# Patient Record
Sex: MALE | Race: WHITE | NOT HISPANIC OR LATINO | Employment: OTHER | RURAL
[De-identification: names, ages, dates, MRNs, and addresses within clinical notes are randomized per-mention and may not be internally consistent; named-entity substitution may affect disease eponyms.]

---

## 2017-03-11 ENCOUNTER — CONVERSION ENCOUNTER (OUTPATIENT)
Dept: FAMILY MEDICINE CLINIC | Facility: CLINIC | Age: 62
End: 2017-03-11

## 2017-03-12 LAB
ALBUMIN SERPL-MCNC: 4.5 G/DL (ref 3.6–5.1)
ALP SERPL-CCNC: 115 U/L (ref 40–115)
ALT SERPL-CCNC: 20 U/L (ref 9–46)
AST SERPL-CCNC: 16 U/L (ref 10–35)
BASOPHILS # BLD AUTO: 12 CELLS/UL (ref 0–200)
BASOPHILS NFR BLD AUTO: 0.2 %
BILIRUB SERPL-MCNC: 0.6 MG/DL (ref 0.2–1.2)
BUN SERPL-MCNC: 19 MG/DL (ref 7–25)
BUN/CREAT SERPL: NORMAL (CALC) (ref 6–22)
CALCIUM SERPL-MCNC: 9.9 MG/DL (ref 8.6–10.3)
CHLORIDE SERPL-SCNC: 106 MMOL/L (ref 98–110)
CHOLEST SERPL-MCNC: 184 MG/DL (ref 125–200)
CHOLEST/HDLC SERPL: 4.7 (CALC)
CONV CO2: 30 MMOL/L (ref 20–31)
CONV TOTAL PROTEIN: 7.2 G/DL (ref 6.1–8.1)
CREAT UR-MCNC: 1.1 MG/DL (ref 0.7–1.33)
EOSINOPHIL # BLD AUTO: 1.6 %
EOSINOPHIL # BLD AUTO: 98 CELLS/UL (ref 15–500)
ERYTHROCYTE [DISTWIDTH] IN BLOOD BY AUTOMATED COUNT: 14.9 % (ref 11–15)
GLOBULIN UR ELPH-MCNC: 2.7 MG/DL (ref 1.9–3.7)
GLUCOSE UR QL: 94 MG/DL (ref 65–99)
HCT VFR BLD AUTO: 41.6 % (ref 38.5–50)
HDLC SERPL-MCNC: 39 MG/DL
HGB BLD-MCNC: 14 G/DL (ref 13.2–17.1)
INSULIN SERPL-ACNC: 1.7 (CALC) (ref 1–2.5)
LDLC SERPL CALC-MCNC: 103 MG/DL
LYMPHOCYTES # BLD AUTO: 1867 CELLS/UL (ref 850–3900)
LYMPHOCYTES NFR BLD AUTO: 30.6 %
MCH RBC QN AUTO: 29.2 PG (ref 27–33)
MCHC RBC AUTO-ENTMCNC: 33.7 G/DL (ref 32–36)
MCV RBC AUTO: 86.6 FL (ref 80–100)
MONOCYTES # BLD AUTO: 415 CELLS/UL (ref 200–950)
MONOCYTES NFR BLD AUTO: 6.8 %
NEUTROPHILS # BLD AUTO: 3709 CELLS/UL (ref 1500–7800)
NEUTROPHILS NFR BLD AUTO: 60.8 %
NONHDLC SERPL-MCNC: 145 MG/DL
PLATELET # BLD AUTO: 167 10*3/UL (ref 140–400)
PMV BLD AUTO: 8.9 FL (ref 7.5–12.5)
POTASSIUM SERPL-SCNC: 4.2 MMOL/L (ref 3.5–5.3)
RBC # BLD AUTO: 4.8 MILLION/UL (ref 4.2–5.8)
SODIUM SERPL-SCNC: 141 MMOL/L (ref 135–146)
TRIGL SERPL-MCNC: 208 MG/DL
TSH SERPL-ACNC: 4.23 MIU/L (ref 0.4–4.5)
WBC # BLD AUTO: 6.1 10*3/UL (ref 3.8–10.8)

## 2017-03-22 ENCOUNTER — HOSPITAL ENCOUNTER (OUTPATIENT)
Dept: PREOP | Facility: HOSPITAL | Age: 62
Setting detail: HOSPITAL OUTPATIENT SURGERY
Discharge: HOME | End: 2017-03-22
Attending: INTERNAL MEDICINE | Admitting: INTERNAL MEDICINE

## 2017-03-22 ENCOUNTER — ON CAMPUS - OUTPATIENT (OUTPATIENT)
Dept: URBAN - METROPOLITAN AREA HOSPITAL 85 | Facility: HOSPITAL | Age: 62
End: 2017-03-22
Payer: COMMERCIAL

## 2017-03-22 DIAGNOSIS — Z85.038 PERSONAL HISTORY OF OTHER MALIGNANT NEOPLASM OF LARGE INTEST: ICD-10-CM

## 2017-03-22 DIAGNOSIS — Z85.048 PERSONAL HISTORY OF OTHER MALIGNANT NEOPLASM OF RECTUM, RECT: ICD-10-CM

## 2017-03-22 PROCEDURE — 44388 COLONOSCOPY THRU STOMA SPX: CPT | Performed by: INTERNAL MEDICINE

## 2017-06-24 ENCOUNTER — CONVERSION ENCOUNTER (OUTPATIENT)
Dept: FAMILY MEDICINE CLINIC | Facility: CLINIC | Age: 62
End: 2017-06-24

## 2017-06-25 LAB
ALBUMIN SERPL-MCNC: 4.5 G/DL (ref 3.6–5.1)
ALP SERPL-CCNC: 117 U/L (ref 40–115)
ALT SERPL-CCNC: 13 U/L (ref 9–46)
AST SERPL-CCNC: 13 U/L (ref 10–35)
BILIRUB SERPL-MCNC: 0.5 MG/DL (ref 0.2–1.2)
BUN SERPL-MCNC: 14 MG/DL (ref 7–25)
BUN/CREAT SERPL: ABNORMAL (CALC) (ref 6–22)
CALCIUM SERPL-MCNC: 9.5 MG/DL (ref 8.6–10.3)
CHLORIDE SERPL-SCNC: 106 MMOL/L (ref 98–110)
CHOLEST SERPL-MCNC: 161 MG/DL (ref 125–200)
CHOLEST/HDLC SERPL: 4.5 (CALC)
CONV CO2: 24 MMOL/L (ref 20–31)
CONV TOTAL PROTEIN: 7 G/DL (ref 6.1–8.1)
CREAT UR-MCNC: 1.16 MG/DL (ref 0.7–1.25)
GLOBULIN UR ELPH-MCNC: 2.5 MG/DL (ref 1.9–3.7)
GLUCOSE UR QL: 97 MG/DL (ref 65–99)
HDLC SERPL-MCNC: 36 MG/DL
INSULIN SERPL-ACNC: 1.8 (CALC) (ref 1–2.5)
LDLC SERPL CALC-MCNC: 63 MG/DL
NONHDLC SERPL-MCNC: 125 MG/DL
POTASSIUM SERPL-SCNC: 3.9 MMOL/L (ref 3.5–5.3)
SODIUM SERPL-SCNC: 140 MMOL/L (ref 135–146)
TRIGL SERPL-MCNC: 308 MG/DL
TSH SERPL-ACNC: 7.22 MIU/L (ref 0.4–4.5)

## 2017-09-11 ENCOUNTER — HOSPITAL ENCOUNTER (OUTPATIENT)
Dept: OTHER | Facility: HOSPITAL | Age: 62
Discharge: HOME | End: 2017-09-11
Attending: FAMILY MEDICINE | Admitting: FAMILY MEDICINE

## 2017-11-15 ENCOUNTER — INPATIENT HOSPITAL (OUTPATIENT)
Dept: URBAN - METROPOLITAN AREA HOSPITAL 84 | Facility: HOSPITAL | Age: 62
End: 2017-11-15
Payer: COMMERCIAL

## 2017-11-15 DIAGNOSIS — K56.609 UNSPECIFIED INTESTINAL OBSTRUCTION, UNSPECIFIED AS TO PARTIA: ICD-10-CM

## 2017-11-15 DIAGNOSIS — D64.89 OTHER SPECIFIED ANEMIAS: ICD-10-CM

## 2017-11-15 DIAGNOSIS — Z85.048 PERSONAL HISTORY OF OTHER MALIGNANT NEOPLASM OF RECTUM, RECT: ICD-10-CM

## 2017-11-15 DIAGNOSIS — R10.30 LOWER ABDOMINAL PAIN, UNSPECIFIED: ICD-10-CM

## 2017-11-15 DIAGNOSIS — R93.3 ABNORMAL FINDINGS ON DIAGNOSTIC IMAGING OF OTHER PARTS OF DI: ICD-10-CM

## 2017-11-15 DIAGNOSIS — Z85.038 PERSONAL HISTORY OF OTHER MALIGNANT NEOPLASM OF LARGE INTEST: ICD-10-CM

## 2017-11-15 DIAGNOSIS — R11.2 NAUSEA WITH VOMITING, UNSPECIFIED: ICD-10-CM

## 2017-11-15 PROCEDURE — 99254 IP/OBS CNSLTJ NEW/EST MOD 60: CPT | Performed by: NURSE PRACTITIONER

## 2017-11-24 ENCOUNTER — HOSPITAL ENCOUNTER (OUTPATIENT)
Dept: HOME HEALTH SERVICES | Facility: HOME HEALTHCARE | Age: 62
Setting detail: SPECIMEN
Discharge: HOME | End: 2017-11-24
Attending: INTERNAL MEDICINE | Admitting: INTERNAL MEDICINE

## 2017-11-24 LAB
ABS VARIANT LYMPHS: 0.07 10*3/UL
CONV ABS BANDS: 1.1 10*3/UL
CONV ANISOCYTES: SLIGHT
CONV VARIANT LYMPHOCYTES RELATIVE PERCENT BY MANUAL COUNT: 1 % (ref 0–1)
DIFFERENTIAL METHOD BLD: (no result)
EOSINOPHIL # BLD AUTO: 0.2 10*3/UL (ref 0–0.3)
EOSINOPHIL # BLD AUTO: 3 % (ref 0–3)
ERYTHROCYTE [DISTWIDTH] IN BLOOD BY AUTOMATED COUNT: 15.3 % (ref 11.5–14.5)
ERYTHROCYTE [SEDIMENTATION RATE] IN BLOOD BY WESTERGREN METHOD: 60 MM/HR (ref 0–20)
HCT VFR BLD AUTO: 32.9 % (ref 40–54)
HGB BLD-MCNC: 10.6 G/DL (ref 14–18)
LYMPHOCYTES # BLD AUTO: 1.3 10*3/UL (ref 0.8–4.8)
LYMPHOCYTES NFR BLD AUTO: 18 % (ref 18–42)
MCH RBC QN AUTO: 26.8 PG (ref 26–32)
MCHC RBC AUTO-ENTMCNC: 32.3 G/DL (ref 32–36)
MCV RBC AUTO: 83 FL (ref 80–94)
MONOCYTES # BLD AUTO: 0.5 10*3/UL (ref 0.1–1.3)
MONOCYTES NFR BLD AUTO: 7 % (ref 2–11)
NEUTROPHILS # BLD AUTO: 3.8 10*3/UL (ref 2.3–8.6)
NEUTROPHILS NFR BLD AUTO: 56 % (ref 50–75)
NEUTS BAND NFR BLD MANUAL: 15 % (ref 0–5)
PLATELET # BLD AUTO: 206 10*3/UL (ref 150–450)
PMV BLD AUTO: 8.5 FL (ref 7.4–10.4)
RBC # BLD AUTO: 3.97 10*6/UL (ref 4.6–6)
WBC # BLD AUTO: 7 10*3/UL (ref 4.5–11.5)

## 2017-11-27 ENCOUNTER — HOSPITAL ENCOUNTER (OUTPATIENT)
Dept: OTHER | Facility: HOSPITAL | Age: 62
Setting detail: SPECIMEN
Discharge: HOME | End: 2017-11-27
Attending: INTERNAL MEDICINE | Admitting: INTERNAL MEDICINE

## 2017-11-27 LAB
ANION GAP SERPL CALC-SCNC: 11.4 MMOL/L (ref 10–20)
BUN SERPL-MCNC: 9 MG/DL (ref 8–20)
BUN/CREAT SERPL: 11.3 (ref 6.2–20.3)
CALCIUM SERPL-MCNC: 9.3 MG/DL (ref 8.9–10.3)
CHLORIDE SERPL-SCNC: 104 MMOL/L (ref 101–111)
CONV CO2: 28 MMOL/L (ref 22–32)
CREAT UR-MCNC: 0.8 MG/DL (ref 0.7–1.2)
CRP SERPL-MCNC: 1.09 MG/DL (ref 0–0.7)
GLUCOSE SERPL-MCNC: 87 MG/DL (ref 65–99)
POTASSIUM SERPL-SCNC: 4.4 MMOL/L (ref 3.6–5.1)
SODIUM SERPL-SCNC: 139 MMOL/L (ref 136–144)

## 2017-12-02 ENCOUNTER — HOSPITAL ENCOUNTER (OUTPATIENT)
Dept: CT IMAGING | Facility: HOSPITAL | Age: 62
Discharge: HOME | End: 2017-12-02
Attending: SURGERY | Admitting: SURGERY

## 2017-12-02 LAB — CREAT BLDA-MCNC: 0.8 MG/DL (ref 0.6–1.3)

## 2017-12-22 ENCOUNTER — HOSPITAL ENCOUNTER (OUTPATIENT)
Dept: HOME HEALTH SERVICES | Facility: HOME HEALTHCARE | Age: 62
Setting detail: SPECIMEN
Discharge: HOME | End: 2017-12-22
Attending: INTERNAL MEDICINE | Admitting: INTERNAL MEDICINE

## 2017-12-22 LAB
CONV ABS BANDS: 0.5 10*3/UL
CONV ABS IMM GRAN: 0.29 10*3/UL
CONV PLATELETS GIANT IN BLOOD BY LIGHT MICROSCOPY: (no result)
CONV POLYCHROMASIA IN BLOOD BY LIGHT MICROSCOPY: (no result)
DIFFERENTIAL METHOD BLD: (no result)
EOSINOPHIL # BLD AUTO: 0.1 10*3/UL (ref 0–0.3)
EOSINOPHIL # BLD AUTO: 1 % (ref 0–3)
ERYTHROCYTE [DISTWIDTH] IN BLOOD BY AUTOMATED COUNT: 18 % (ref 11.5–14.5)
ERYTHROCYTE [SEDIMENTATION RATE] IN BLOOD BY WESTERGREN METHOD: 35 MM/HR (ref 0–20)
HCT VFR BLD AUTO: 26.1 % (ref 40–54)
HGB BLD-MCNC: 8.4 G/DL (ref 14–18)
LYMPHOCYTES # BLD AUTO: 1.1 10*3/UL (ref 0.8–4.8)
LYMPHOCYTES NFR BLD AUTO: 11 % (ref 18–42)
MCH RBC QN AUTO: 25.9 PG (ref 26–32)
MCHC RBC AUTO-ENTMCNC: 32 G/DL (ref 32–36)
MCV RBC AUTO: 81 FL (ref 80–94)
METAMYELOCYTES NFR BLD: 3 %
MONOCYTES # BLD AUTO: 0.5 10*3/UL (ref 0.1–1.3)
MONOCYTES NFR BLD AUTO: 5 % (ref 2–11)
NEUTROPHILS # BLD AUTO: 7.1 10*3/UL (ref 2.3–8.6)
NEUTROPHILS NFR BLD AUTO: 75 % (ref 50–75)
NEUTS BAND NFR BLD MANUAL: 5 % (ref 0–5)
PLATELET # BLD AUTO: 281 10*3/UL (ref 150–450)
PMV BLD AUTO: 8 FL (ref 7.4–10.4)
RBC # BLD AUTO: 3.23 10*6/UL (ref 4.6–6)
WBC # BLD AUTO: 9.6 10*3/UL (ref 4.5–11.5)

## 2018-03-12 ENCOUNTER — CONVERSION ENCOUNTER (OUTPATIENT)
Dept: FAMILY MEDICINE CLINIC | Facility: CLINIC | Age: 63
End: 2018-03-12

## 2018-03-13 LAB
ALBUMIN SERPL-MCNC: 4.7 G/DL (ref 3.6–5.1)
ALP SERPL-CCNC: 222 U/L (ref 40–115)
ALT SERPL-CCNC: 56 U/L (ref 9–46)
AST SERPL-CCNC: 31 U/L (ref 10–35)
BASOPHILS # BLD AUTO: 32 CELLS/UL (ref 0–200)
BASOPHILS NFR BLD AUTO: 0.5 %
BILIRUB SERPL-MCNC: 0.7 MG/DL (ref 0.2–1.2)
BUN SERPL-MCNC: 11 MG/DL (ref 7–25)
BUN/CREAT SERPL: ABNORMAL (CALC) (ref 6–22)
CALCIUM SERPL-MCNC: 8.8 MG/DL (ref 8.6–10.3)
CHLORIDE SERPL-SCNC: 103 MMOL/L (ref 98–110)
CHOLEST SERPL-MCNC: 147 MG/DL
CHOLEST/HDLC SERPL: 4 (CALC)
CONV CO2: 28 MMOL/L (ref 20–31)
CONV TOTAL PROTEIN: 7.6 G/DL (ref 6.1–8.1)
CREAT UR-MCNC: 0.87 MG/DL (ref 0.7–1.25)
EOSINOPHIL # BLD AUTO: 1.1 %
EOSINOPHIL # BLD AUTO: 70 CELLS/UL (ref 15–500)
ERYTHROCYTE [DISTWIDTH] IN BLOOD BY AUTOMATED COUNT: 14.6 % (ref 11–15)
GLOBULIN UR ELPH-MCNC: 2.9 MG/DL (ref 1.9–3.7)
GLUCOSE UR QL: 79 MG/DL (ref 65–99)
HCT VFR BLD AUTO: 43.3 % (ref 38.5–50)
HDLC SERPL-MCNC: 37 MG/DL
HGB BLD-MCNC: 14.1 G/DL (ref 13.2–17.1)
INSULIN SERPL-ACNC: 1.6 (CALC) (ref 1–2.5)
LYMPHOCYTES # BLD AUTO: 2240 CELLS/UL (ref 850–3900)
LYMPHOCYTES NFR BLD AUTO: 35 %
MCH RBC QN AUTO: 27.1 PG (ref 27–33)
MCHC RBC AUTO-ENTMCNC: 32.6 G/DL (ref 32–36)
MCV RBC AUTO: 83.3 FL (ref 80–100)
MONOCYTES # BLD AUTO: 467 CELLS/UL (ref 200–950)
MONOCYTES NFR BLD AUTO: 7.3 %
NEUTROPHILS # BLD AUTO: 3590 CELLS/UL (ref 1500–7800)
NEUTROPHILS NFR BLD AUTO: 56.1 %
NONHDLC SERPL-MCNC: 110 MG/DL
PLATELET # BLD AUTO: 211 10*3/UL (ref 140–400)
PMV BLD AUTO: 10.7 FL (ref 7.5–12.5)
POTASSIUM SERPL-SCNC: 3.5 MMOL/L (ref 3.5–5.3)
RBC # BLD AUTO: 5.2 MILLION/UL (ref 4.2–5.8)
SODIUM SERPL-SCNC: 141 MMOL/L (ref 135–146)
TRIGL SERPL-MCNC: 483 MG/DL
TSH SERPL-ACNC: 6.51 MIU/L (ref 0.4–4.5)
WBC # BLD AUTO: 6.4 10*3/UL (ref 3.8–10.8)

## 2018-06-16 ENCOUNTER — CONVERSION ENCOUNTER (OUTPATIENT)
Dept: FAMILY MEDICINE CLINIC | Facility: CLINIC | Age: 63
End: 2018-06-16

## 2018-06-17 LAB
ALBUMIN SERPL-MCNC: 4.6 G/DL (ref 3.6–5.1)
ALP SERPL-CCNC: 144 U/L (ref 40–115)
ALT SERPL-CCNC: 41 U/L (ref 9–46)
AST SERPL-CCNC: 28 U/L (ref 10–35)
BILIRUB SERPL-MCNC: 0.9 MG/DL (ref 0.2–1.2)
BUN SERPL-MCNC: 7 MG/DL (ref 7–25)
BUN/CREAT SERPL: ABNORMAL (CALC) (ref 6–22)
CALCIUM SERPL-MCNC: 9.3 MG/DL (ref 8.6–10.3)
CHLORIDE SERPL-SCNC: 102 MMOL/L (ref 98–110)
CHOLEST SERPL-MCNC: 119 MG/DL
CHOLEST/HDLC SERPL: 3.1 (CALC)
CONV CO2: 23 MMOL/L (ref 20–31)
CONV TOTAL PROTEIN: 7.2 G/DL (ref 6.1–8.1)
CREAT UR-MCNC: 1.03 MG/DL (ref 0.7–1.25)
GLOBULIN UR ELPH-MCNC: 2.6 MG/DL (ref 1.9–3.7)
GLUCOSE UR QL: 90 MG/DL (ref 65–99)
HDLC SERPL-MCNC: 39 MG/DL
INSULIN SERPL-ACNC: 1.8 (CALC) (ref 1–2.5)
LDLC SERPL CALC-MCNC: 50 MG/DL
NONHDLC SERPL-MCNC: 80 MG/DL
POTASSIUM SERPL-SCNC: 3.1 MMOL/L (ref 3.5–5.3)
SODIUM SERPL-SCNC: 138 MMOL/L (ref 135–146)
TRIGL SERPL-MCNC: 244 MG/DL
TSH SERPL-ACNC: 2.13 MIU/L (ref 0.4–4.5)

## 2018-07-14 ENCOUNTER — CONVERSION ENCOUNTER (OUTPATIENT)
Dept: FAMILY MEDICINE CLINIC | Facility: CLINIC | Age: 63
End: 2018-07-14

## 2018-07-15 LAB
ALBUMIN SERPL-MCNC: 4.4 G/DL (ref 3.6–5.1)
ALP SERPL-CCNC: 151 U/L (ref 40–115)
ALT SERPL-CCNC: 47 U/L (ref 9–46)
AST SERPL-CCNC: 25 U/L (ref 10–35)
BILIRUB SERPL-MCNC: 0.7 MG/DL (ref 0.2–1.2)
BUN SERPL-MCNC: 9 MG/DL (ref 7–25)
BUN/CREAT SERPL: ABNORMAL (CALC) (ref 6–22)
CALCIUM SERPL-MCNC: 9.3 MG/DL (ref 8.6–10.3)
CHLORIDE SERPL-SCNC: 107 MMOL/L (ref 98–110)
CONV CO2: 23 MMOL/L (ref 20–31)
CONV TOTAL PROTEIN: 7.2 G/DL (ref 6.1–8.1)
CREAT UR-MCNC: 1.02 MG/DL (ref 0.7–1.25)
GLOBULIN UR ELPH-MCNC: 2.8 MG/DL (ref 1.9–3.7)
GLUCOSE UR QL: 89 MG/DL (ref 65–99)
INSULIN SERPL-ACNC: 1.6 (CALC) (ref 1–2.5)
POTASSIUM SERPL-SCNC: 3.7 MMOL/L (ref 3.5–5.3)
SODIUM SERPL-SCNC: 138 MMOL/L (ref 135–146)

## 2018-10-25 ENCOUNTER — CONVERSION ENCOUNTER (OUTPATIENT)
Dept: FAMILY MEDICINE CLINIC | Facility: CLINIC | Age: 63
End: 2018-10-25

## 2018-10-26 LAB
ALBUMIN SERPL-MCNC: 4.7 G/DL (ref 3.6–5.1)
ALP SERPL-CCNC: 138 U/L (ref 40–115)
ALT SERPL-CCNC: 54 U/L (ref 9–46)
AST SERPL-CCNC: 28 U/L (ref 10–35)
BILIRUB SERPL-MCNC: 1.1 MG/DL (ref 0.2–1.2)
BUN SERPL-MCNC: 12 MG/DL (ref 7–25)
BUN/CREAT SERPL: ABNORMAL (CALC) (ref 6–22)
CALCIUM SERPL-MCNC: 9.4 MG/DL (ref 8.6–10.3)
CHLORIDE SERPL-SCNC: 107 MMOL/L (ref 98–110)
CONV CO2: 25 MMOL/L (ref 20–32)
CONV TOTAL PROTEIN: 7.2 G/DL (ref 6.1–8.1)
CREAT UR-MCNC: 1.12 MG/DL (ref 0.7–1.25)
GLOBULIN UR ELPH-MCNC: 2.5 MG/DL (ref 1.9–3.7)
GLUCOSE UR QL: 84 MG/DL (ref 65–99)
INSULIN SERPL-ACNC: 1.9 (CALC) (ref 1–2.5)
MAGNESIUM SERPL-MCNC: 1.3 MG/DL (ref 1.5–2.5)
POTASSIUM SERPL-SCNC: 4.3 MMOL/L (ref 3.5–5.3)
SODIUM SERPL-SCNC: 139 MMOL/L (ref 135–146)

## 2019-01-03 ENCOUNTER — CONVERSION ENCOUNTER (OUTPATIENT)
Dept: FAMILY MEDICINE CLINIC | Facility: CLINIC | Age: 64
End: 2019-01-03

## 2019-01-04 LAB
ALBUMIN SERPL-MCNC: 4.8 G/DL (ref 3.6–5.1)
ALP SERPL-CCNC: 133 U/L (ref 40–115)
ALT SERPL-CCNC: 53 U/L (ref 9–46)
AST SERPL-CCNC: 34 U/L (ref 10–35)
BILIRUB SERPL-MCNC: 0.8 MG/DL (ref 0.2–1.2)
BUN SERPL-MCNC: 13 MG/DL (ref 7–25)
BUN/CREAT SERPL: ABNORMAL (CALC) (ref 6–22)
CALCIUM SERPL-MCNC: 9.9 MG/DL (ref 8.6–10.3)
CHLORIDE SERPL-SCNC: 106 MMOL/L (ref 98–110)
CHOLEST SERPL-MCNC: 140 MG/DL
CHOLEST/HDLC SERPL: 3.5 (CALC)
CONV CO2: 31 MMOL/L (ref 20–32)
CONV TOTAL PROTEIN: 7.8 G/DL (ref 6.1–8.1)
CREAT UR-MCNC: 1.12 MG/DL (ref 0.7–1.25)
GLOBULIN UR ELPH-MCNC: 3 MG/DL (ref 1.9–3.7)
GLUCOSE UR QL: 86 MG/DL (ref 65–99)
HDLC SERPL-MCNC: 40 MG/DL
INSULIN SERPL-ACNC: 1.6 (CALC) (ref 1–2.5)
LDLC SERPL CALC-MCNC: 60 MG/DL
MAGNESIUM SERPL-MCNC: 1.7 MG/DL (ref 1.5–2.5)
NONHDLC SERPL-MCNC: 100 MG/DL
POTASSIUM SERPL-SCNC: 4.4 MMOL/L (ref 3.5–5.3)
SODIUM SERPL-SCNC: 142 MMOL/L (ref 135–146)
TRIGL SERPL-MCNC: 331 MG/DL

## 2019-04-15 ENCOUNTER — HOSPITAL ENCOUNTER (OUTPATIENT)
Dept: OTHER | Facility: HOSPITAL | Age: 64
Setting detail: SPECIMEN
Discharge: HOME | End: 2019-04-15
Attending: UROLOGY | Admitting: UROLOGY

## 2019-04-15 ENCOUNTER — CONVERSION ENCOUNTER (OUTPATIENT)
Dept: FAMILY MEDICINE CLINIC | Facility: CLINIC | Age: 64
End: 2019-04-15

## 2019-04-15 LAB
ALBUMIN SERPL-MCNC: 4.3 G/DL (ref 3.6–5.1)
ALP SERPL-CCNC: 151 U/L (ref 40–115)
ALT SERPL-CCNC: 54 U/L (ref 9–46)
AST SERPL-CCNC: 31 U/L (ref 10–35)
BILIRUB SERPL-MCNC: 1.3 MG/DL (ref 0.2–1.2)
BUN SERPL-MCNC: 15 MG/DL (ref 7–25)
BUN/CREAT SERPL: 10 (CALC) (ref 6–22)
CALCIUM SERPL-MCNC: 9.2 MG/DL (ref 8.6–10.3)
CHLORIDE SERPL-SCNC: 103 MMOL/L (ref 98–110)
CHOLEST SERPL-MCNC: 104 MG/DL
CHOLEST/HDLC SERPL: 2.9 (CALC)
CONV CO2: 26 MMOL/L (ref 20–32)
CONV TOTAL PROTEIN: 7 G/DL (ref 6.1–8.1)
CREAT UR-MCNC: 1.46 MG/DL (ref 0.7–1.25)
GLOBULIN UR ELPH-MCNC: 2.7 MG/DL (ref 1.9–3.7)
GLUCOSE UR QL: 128 MG/DL (ref 65–139)
HDLC SERPL-MCNC: 36 MG/DL
INSULIN SERPL-ACNC: 1.6 (CALC) (ref 1–2.5)
LDLC SERPL CALC-MCNC: 42 MG/DL
NONHDLC SERPL-MCNC: 68 MG/DL
POTASSIUM SERPL-SCNC: 3.3 MMOL/L (ref 3.5–5.3)
SODIUM SERPL-SCNC: 139 MMOL/L (ref 135–146)
SPECIMEN SOURCE: NORMAL
TRIGL SERPL-MCNC: 184 MG/DL

## 2019-04-25 ENCOUNTER — CONVERSION ENCOUNTER (OUTPATIENT)
Dept: FAMILY MEDICINE CLINIC | Facility: CLINIC | Age: 64
End: 2019-04-25

## 2019-04-29 LAB
ALBUMIN SERPL-MCNC: 4.3 G/DL (ref 3.6–5.1)
ALP BONE CFR SERPL: 29 % (ref 28–66)
ALP INTEST CFR SERPL: 3 % (ref 1–24)
ALP ISO SERPL-ACNC: 68 % (ref 25–69)
ALP SERPL-CCNC: 116 U/L (ref 40–115)
ALP SERPL-CCNC: 116 U/L (ref 40–115)
ALT SERPL-CCNC: 60 U/L (ref 9–46)
AST SERPL-CCNC: 34 U/L (ref 10–35)
BILIRUB SERPL-MCNC: 0.7 MG/DL (ref 0.2–1.2)
BUN SERPL-MCNC: 15 MG/DL (ref 7–25)
BUN/CREAT SERPL: ABNORMAL (CALC) (ref 6–22)
CALCIUM SERPL-MCNC: 9.4 MG/DL (ref 8.6–10.3)
CHLORIDE SERPL-SCNC: 104 MMOL/L (ref 98–110)
CONV CO2: 26 MMOL/L (ref 20–32)
CONV TOTAL PROTEIN: 6.6 G/DL (ref 6.1–8.1)
CREAT UR-MCNC: 1.22 MG/DL (ref 0.7–1.25)
GGT SERPL-CCNC: 51 U/L (ref 3–70)
GLOBULIN UR ELPH-MCNC: 2.3 MG/DL (ref 1.9–3.7)
GLUCOSE UR QL: 83 MG/DL (ref 65–99)
INSULIN SERPL-ACNC: 1.9 (CALC) (ref 1–2.5)
POTASSIUM SERPL-SCNC: 4 MMOL/L (ref 3.5–5.3)
SODIUM SERPL-SCNC: 138 MMOL/L (ref 135–146)

## 2019-05-09 ENCOUNTER — HOSPITAL ENCOUNTER (OUTPATIENT)
Dept: OTHER | Facility: HOSPITAL | Age: 64
Discharge: HOME | End: 2019-05-09
Attending: UROLOGY | Admitting: UROLOGY

## 2019-07-23 ENCOUNTER — OFFICE VISIT (OUTPATIENT)
Dept: FAMILY MEDICINE CLINIC | Facility: CLINIC | Age: 64
End: 2019-07-23

## 2019-07-23 VITALS
BODY MASS INDEX: 23.55 KG/M2 | TEMPERATURE: 97.7 F | HEART RATE: 72 BPM | OXYGEN SATURATION: 100 % | WEIGHT: 155.4 LBS | RESPIRATION RATE: 18 BRPM | DIASTOLIC BLOOD PRESSURE: 85 MMHG | HEIGHT: 68 IN | SYSTOLIC BLOOD PRESSURE: 126 MMHG

## 2019-07-23 DIAGNOSIS — I10 ESSENTIAL HYPERTENSION: ICD-10-CM

## 2019-07-23 DIAGNOSIS — R79.89 ELEVATED LIVER FUNCTION TESTS: ICD-10-CM

## 2019-07-23 DIAGNOSIS — N18.2 STAGE 2 CHRONIC KIDNEY DISEASE: ICD-10-CM

## 2019-07-23 DIAGNOSIS — R74.8 ELEVATED ALKALINE PHOSPHATASE LEVEL: ICD-10-CM

## 2019-07-23 DIAGNOSIS — E83.42 HYPOMAGNESEMIA: ICD-10-CM

## 2019-07-23 DIAGNOSIS — E78.2 MIXED HYPERLIPIDEMIA: Primary | ICD-10-CM

## 2019-07-23 DIAGNOSIS — E03.8 OTHER SPECIFIED HYPOTHYROIDISM: ICD-10-CM

## 2019-07-23 PROBLEM — E03.9 HYPOTHYROIDISM: Status: ACTIVE | Noted: 2019-07-23

## 2019-07-23 PROBLEM — D72.829 LEUKOCYTOSIS: Status: ACTIVE | Noted: 2019-07-23

## 2019-07-23 PROBLEM — E78.5 HYPERLIPIDEMIA: Status: ACTIVE | Noted: 2019-07-23

## 2019-07-23 PROCEDURE — 99214 OFFICE O/P EST MOD 30 MIN: CPT | Performed by: FAMILY MEDICINE

## 2019-07-23 RX ORDER — ATORVASTATIN CALCIUM 10 MG/1
10 TABLET, FILM COATED ORAL DAILY
COMMUNITY
Start: 2014-04-14 | End: 2019-07-27 | Stop reason: SDUPTHER

## 2019-07-23 RX ORDER — LEVOTHYROXINE SODIUM 88 MCG
TABLET ORAL
COMMUNITY
Start: 2019-05-22 | End: 2020-02-17

## 2019-07-23 RX ORDER — OMEPRAZOLE 40 MG/1
40 CAPSULE, DELAYED RELEASE ORAL DAILY
COMMUNITY
End: 2019-10-22 | Stop reason: SDUPTHER

## 2019-07-23 RX ORDER — POTASSIUM CHLORIDE 20 MEQ/1
TABLET, EXTENDED RELEASE ORAL
COMMUNITY
Start: 2019-07-18 | End: 2019-10-27 | Stop reason: SDUPTHER

## 2019-07-23 NOTE — ASSESSMENT & PLAN NOTE
Worse, Labs done 7-18-19, read by me, reviewed with Pt. Magnesium was 0.8 down from 1.7. Advised to restart magnesium and we will repeat Magnesium level in 2-3 weeks.

## 2019-07-23 NOTE — ASSESSMENT & PLAN NOTE
Labs done 7-18-19, read by me, reviewed with pt.  Trig. 375 up from 184, Tot. Chol. 134 up from 104, HDL 42 up from 36, LDL 53 up from 42.  Stable   Encouraged to watch fatty intake, exercise more, and lose weight.   Compliant with medication.  Is not getting adequate diet and exercise  Goals developed at last visit were not met because diet and exercise  Follow up in 3  months  Care management needs are self-addressed.  Self-management abilities addressed and patient is capable of managing his own disease.

## 2019-07-23 NOTE — PROGRESS NOTES
Subjective   Lavelle Mitchell is a 64 y.o. male.   Chief Complaint   Patient presents with   • Hyperlipidemia   • Hypertension     Hyperlipidemia   This is a chronic problem. The current episode started more than 1 year ago. The problem is controlled. Exacerbating diseases include hypothyroidism. There are no known factors aggravating his hyperlipidemia. Pertinent negatives include no chest pain, myalgias or shortness of breath. Current antihyperlipidemic treatment includes statins. There are no compliance problems.  Risk factors for coronary artery disease include hypertension and dyslipidemia.   Hypertension   This is a chronic problem. The current episode started more than 1 year ago. The problem is controlled. Pertinent negatives include no chest pain, palpitations or shortness of breath. There are no associated agents to hypertension. Risk factors for coronary artery disease include dyslipidemia and family history. There are no compliance problems.    Hypothyroidism   This is a chronic problem. The current episode started more than 1 year ago. Pertinent negatives include no chest pain, fatigue, myalgias or nausea. Nothing aggravates the symptoms.        The following portions of the patient's history were reviewed and updated as appropriate: allergies, current medications, past family history, past medical history, past social history, past surgical history and problem list.    Review of Systems   Constitutional: Negative for fatigue, unexpected weight gain and unexpected weight loss.   Respiratory: Negative for shortness of breath.    Cardiovascular: Negative for chest pain, palpitations and leg swelling.   Gastrointestinal: Negative for nausea.   Musculoskeletal: Negative for myalgias.   Skin: Negative for dry skin.   Neurological: Negative for headache.       Objective   Visit Vitals  /85 (BP Location: Left arm, Patient Position: Sitting, Cuff Size: Adult)   Pulse 72   Temp 97.7 °F (36.5 °C) (Oral)  "  Resp 18   Ht 172.7 cm (68\")   Wt 70.5 kg (155 lb 6.4 oz)   SpO2 100%   BMI 23.63 kg/m²     Physical Exam   Constitutional: He is oriented to person, place, and time. He appears well-developed and well-nourished. He is cooperative.   HENT:   Head: Normocephalic.   Neck: Trachea normal. Neck supple. Carotid bruit is not present. No thyromegaly present.   Cardiovascular: Normal rate and regular rhythm. Exam reveals no gallop and no friction rub.   No murmur heard.  Neurological: He is alert and oriented to person, place, and time.   Skin: Skin is dry. No rash noted. Nails show no clubbing.       Assessment/Plan    Problem List Items Addressed This Visit        Cardiovascular and Mediastinum    Hyperlipidemia - Primary    Current Assessment & Plan     Labs done 7-18-19, read by me, reviewed with pt.  Trig. 375 up from 184, Tot. Chol. 134 up from 104, HDL 42 up from 36, LDL 53 up from 42.  Stable   Encouraged to watch fatty intake, exercise more, and lose weight.   Compliant with medication.  Is not getting adequate diet and exercise  Goals developed at last visit were not met because diet and exercise  Follow up in 3  months  Care management needs are self-addressed.  Self-management abilities addressed and patient is capable of managing his own disease.           Relevant Medications    atorvastatin (LIPITOR) 10 MG tablet    Hypertension    Current Assessment & Plan     Doing well  Encouraged to watch salt, exercise more and lose weight.              Endocrine    Hypothyroidism    Current Assessment & Plan     Doing well.  Labs done 7-18-19, read by me, reviewed with pt.  TSH was 4.02 up from 1.71         Relevant Medications    SYNTHROID 88 MCG tablet       Genitourinary    Stage 2 chronic kidney disease    Current Assessment & Plan     Improved.  Labs done 7-18-19, read by me, reviewed with pt.  Creatinine and EGFR was normal.            Other    Elevated alkaline phosphatase level    Current Assessment & Plan     " Resolved.  Labs done 7-18-19, read by me, reviewed with pt.  Alkaline Phosphatase down from 116         Elevated liver function tests    Current Assessment & Plan     Improved.  Labs done 7-18-19, read by me, reviewed with pt.  AST and ALT was normal.         Hypomagnesemia    Current Assessment & Plan     Worse, Labs done 7-18-19, read by me, reviewed with Pt. Magnesium was 0.8 down from 1.7. Advised to restart magnesium and we will repeat Magnesium level in 2-3 weeks.         Relevant Orders    Magnesium        Problem List Items Addressed This Visit     None

## 2019-07-29 RX ORDER — ATORVASTATIN CALCIUM 10 MG/1
TABLET, FILM COATED ORAL
Qty: 90 TABLET | Refills: 1 | Status: SHIPPED | OUTPATIENT
Start: 2019-07-29 | End: 2020-01-27

## 2019-08-08 ENCOUNTER — RESULTS ENCOUNTER (OUTPATIENT)
Dept: FAMILY MEDICINE CLINIC | Facility: CLINIC | Age: 64
End: 2019-08-08

## 2019-08-08 DIAGNOSIS — E83.42 HYPOMAGNESEMIA: ICD-10-CM

## 2019-08-09 LAB — MAGNESIUM SERPL-MCNC: 1.2 MG/DL (ref 1.6–2.3)

## 2019-10-16 PROBLEM — K56.609 BOWEL OBSTRUCTION (HCC): Status: ACTIVE | Noted: 2018-01-02

## 2019-10-16 PROBLEM — K80.10 CHOLELITHIASIS WITH CHOLECYSTITIS: Status: ACTIVE | Noted: 2017-09-12

## 2019-10-17 ENCOUNTER — OFFICE VISIT (OUTPATIENT)
Dept: FAMILY MEDICINE CLINIC | Facility: CLINIC | Age: 64
End: 2019-10-17

## 2019-10-17 VITALS
HEART RATE: 72 BPM | OXYGEN SATURATION: 100 % | HEIGHT: 68 IN | DIASTOLIC BLOOD PRESSURE: 85 MMHG | RESPIRATION RATE: 18 BRPM | BODY MASS INDEX: 24.22 KG/M2 | TEMPERATURE: 97.8 F | SYSTOLIC BLOOD PRESSURE: 130 MMHG | WEIGHT: 159.8 LBS

## 2019-10-17 DIAGNOSIS — E78.2 MIXED HYPERLIPIDEMIA: ICD-10-CM

## 2019-10-17 DIAGNOSIS — R35.1 NOCTURIA: ICD-10-CM

## 2019-10-17 DIAGNOSIS — E83.42 HYPOMAGNESEMIA: ICD-10-CM

## 2019-10-17 DIAGNOSIS — Z12.5 SCREENING PSA (PROSTATE SPECIFIC ANTIGEN): ICD-10-CM

## 2019-10-17 DIAGNOSIS — H83.3X9 NOISE-INDUCED HEARING LOSS, UNSPECIFIED LATERALITY: ICD-10-CM

## 2019-10-17 DIAGNOSIS — I10 ESSENTIAL HYPERTENSION: Primary | ICD-10-CM

## 2019-10-17 DIAGNOSIS — D64.9 ANEMIA, UNSPECIFIED TYPE: ICD-10-CM

## 2019-10-17 DIAGNOSIS — I45.10 RIGHT BUNDLE BRANCH BLOCK: ICD-10-CM

## 2019-10-17 DIAGNOSIS — E03.8 OTHER SPECIFIED HYPOTHYROIDISM: ICD-10-CM

## 2019-10-17 PROBLEM — H91.90 HEARING LOSS: Status: ACTIVE | Noted: 2019-10-17

## 2019-10-17 LAB
BILIRUB BLD-MCNC: NEGATIVE MG/DL
CLARITY, POC: CLEAR
COLOR UR: YELLOW
GLUCOSE UR STRIP-MCNC: NEGATIVE MG/DL
KETONES UR QL: NEGATIVE
LEUKOCYTE EST, POC: NEGATIVE
NITRITE UR-MCNC: NEGATIVE MG/ML
PH UR: 5 [PH] (ref 5–8)
PROT UR STRIP-MCNC: NEGATIVE MG/DL
RBC # UR STRIP: NEGATIVE /UL
SP GR UR: 1.01 (ref 1–1.03)
UROBILINOGEN UR QL: NORMAL

## 2019-10-17 PROCEDURE — 93000 ELECTROCARDIOGRAM COMPLETE: CPT | Performed by: FAMILY MEDICINE

## 2019-10-17 PROCEDURE — 99214 OFFICE O/P EST MOD 30 MIN: CPT | Performed by: FAMILY MEDICINE

## 2019-10-17 PROCEDURE — 92552 PURE TONE AUDIOMETRY AIR: CPT | Performed by: FAMILY MEDICINE

## 2019-10-17 PROCEDURE — 81002 URINALYSIS NONAUTO W/O SCOPE: CPT | Performed by: FAMILY MEDICINE

## 2019-10-17 RX ORDER — MAGNESIUM CHLORIDE 64 MG
64 TABLET, DELAYED RELEASE (ENTERIC COATED) ORAL DAILY
COMMUNITY
End: 2019-10-27 | Stop reason: SDUPTHER

## 2019-10-17 NOTE — PROGRESS NOTES
Subjective   Lavelle Mitchell is a 64 y.o. male.     Heart Problem   This is a chronic (right bundle branch block) problem. The current episode started more than 1 year ago. The problem has been unchanged. Pertinent negatives include no chest pain, fatigue or joint swelling. Nothing aggravates the symptoms. He has tried nothing for the symptoms.   Hearing Problem   This is a chronic problem. The current episode started more than 1 year ago. The problem occurs constantly. The problem has been unchanged. Pertinent negatives include no chest pain, fatigue or joint swelling. Nothing aggravates the symptoms. He has tried nothing for the symptoms.   Urinary Frequency    This is a chronic problem. The current episode started more than 1 year ago. The problem occurs intermittently. The problem has been unchanged. The pain is at a severity of 0/10. The patient is experiencing no pain. There has been no fever. He is sexually active. There is no history of pyelonephritis. Pertinent negatives include no frequency. He has tried nothing for the symptoms.        The following portions of the patient's history were reviewed and updated as appropriate: allergies, current medications, past family history, past medical history, past social history, past surgical history and problem list.    Family History   Problem Relation Age of Onset   • Heart disease Mother    • Hyperlipidemia Mother    • Diabetes Mother    • Cancer Mother         stomach   • Heart disease Father    • Stroke Father    • Hyperlipidemia Father    • Hypertension Sister    • Diabetes Sister    • Birth defects Maternal Grandmother    • Birth defects Paternal Grandmother        Social History     Tobacco Use   • Smoking status: Former Smoker     Types: Pipe     Last attempt to quit: 2008     Years since quittin.3   • Smokeless tobacco: Never Used   Substance Use Topics   • Alcohol use: Yes     Frequency: 2-4 times a month     Drinks per session: 1 or 2     Binge  "frequency: Never   • Drug use: No       Past Surgical History:   Procedure Laterality Date   • CHOLECYSTECTOMY     • COLON SURGERY     • COLONOSCOPY     • LYMPH NODE BIOPSY         Patient Active Problem List   Diagnosis   • Hyperlipidemia   • Hypertension   • Hypothyroidism   • Elevated alkaline phosphatase level   • Elevated liver function tests   • Stage 2 chronic kidney disease   • Hypomagnesemia   • Leukocytosis   • Anemia   • Bowel obstruction (CMS/HCC)   • Cholelithiasis with cholecystitis   • Dyslipidemia   • Malignant neoplasm of colon (CMS/HCC)   • Personal history of rectal cancer   • Right bundle branch block   • Hearing loss   • Screening PSA (prostate specific antigen)   • Nocturia       Current Outpatient Medications on File Prior to Visit   Medication Sig Dispense Refill   • atorvastatin (LIPITOR) 10 MG tablet TAKE 1 TABLET DAILY 90 tablet 1   • magnesium chloride ER 64 MG DR tablet Take 64 mg by mouth Daily.     • omeprazole (priLOSEC) 40 MG capsule Take 40 mg by mouth Daily.     • potassium chloride (K-DUR,KLOR-CON) 20 MEQ CR tablet      • SYNTHROID 88 MCG tablet        No current facility-administered medications on file prior to visit.        No Known Allergies    Review of Systems   Constitutional: Negative for fatigue.   HENT: Positive for hearing loss.    Respiratory: Negative for shortness of breath.    Cardiovascular: Negative for chest pain and palpitations.   Genitourinary: Positive for nocturia. Negative for frequency.        Nocturia 1x nightly   Musculoskeletal: Negative for joint swelling.   Neurological: Negative for memory problem.       Objective   Visit Vitals  /85 (BP Location: Right arm, Patient Position: Sitting, Cuff Size: Adult)   Pulse 72   Temp 97.8 °F (36.6 °C) (Oral)   Resp 18   Ht 172.7 cm (68\")   Wt 72.5 kg (159 lb 12.8 oz)   SpO2 100%   BMI 24.30 kg/m²     Physical Exam   Constitutional: He is oriented to person, place, and time. He appears well-developed and " well-nourished. He is cooperative.   HENT:   Head: Normocephalic.   Right Ear: Tympanic membrane, external ear and ear canal normal.   Left Ear: Tympanic membrane, external ear and ear canal normal.   Neck: Trachea normal. Neck supple. Carotid bruit is not present. No thyromegaly present.   Cardiovascular: Normal rate and regular rhythm. Exam reveals no gallop and no friction rub.   No murmur heard.  Neurological: He is alert and oriented to person, place, and time.   Skin: Skin is dry. No rash noted. Nails show no clubbing.         Assessment/Plan .  Problem List Items Addressed This Visit        Cardiovascular and Mediastinum    Hyperlipidemia    Current Assessment & Plan     Pt. Sent to lab will discuss results at follow up         Relevant Orders    Comprehensive metabolic panel    Lipid Panel w/ Chol/HDL Ratio    Hypertension - Primary    Current Assessment & Plan     Hypertension is unchanged. Borderline poor control  Continue current treatment regimen.  Dietary sodium restriction.  Regular aerobic exercise.  Blood pressure will be reassessed at the next regular appointment.         Right bundle branch block    Current Assessment & Plan     EKG done today, read by me, stable follow conserv           Relevant Orders    ECG 12 Lead       Endocrine    Hypothyroidism    Current Assessment & Plan     Pt. Sent to lab will discuss results at follow up         Relevant Orders    TSH       Nervous and Auditory    Hearing loss    Current Assessment & Plan     Audiogram done today, read by me, worse avoid noise exposure            Genitourinary    Nocturia    Current Assessment & Plan     U/A done today, nl - declines treatment         Relevant Orders    Urinalysis, Manual Only - Urine, Clean Catch    POCT urinalysis dipstick, manual (Completed)       Hematopoietic and Hemostatic    Anemia    Current Assessment & Plan     Pt. Sent to lab will discuss results at follow up         Relevant Orders    CBC w AUTO Differential        Other    Hypomagnesemia    Current Assessment & Plan     Pt. Sent to lab will discuss results at follow up         Relevant Orders    Magnesium    Screening PSA (prostate specific antigen)    Current Assessment & Plan     Pt. Sent to lab will discuss results at follow up         Relevant Orders    PSA SCREENING

## 2019-10-17 NOTE — PROGRESS NOTES
"QUICK REFERENCE INFORMATION:  The ABCs of the Annual Wellness Visit    {Medicare visit type:79235}     HEALTH RISK ASSESSMENT    : 1955    Recent Hospitalizations:  {Hospitalization history:6621944725::\"No hospitalization(s) within the last year.\"}.    Current Medical Providers:  Patient Care Team:  Paolo Penny MD as PCP - General  Paolo Penny MD as PCP - Family Medicine    Smoking Status:  Social History     Tobacco Use   Smoking Status Former Smoker   • Types: Pipe   • Last attempt to quit: 2008   • Years since quittin.3       Alcohol Consumption:  Social History     Substance and Sexual Activity   Alcohol Use Yes   • Frequency: 2-3 times a week   • Binge frequency: Never       Depression Screen:   No flowsheet data found.  I spent {getime2:35613} asking patient questions, counseling and documenting in the chart.    Health Habits and Functional and Cognitive Screening:  No flowsheet data found.    Does the patient have evidence of cognitive impairment? {Yes/No w/ pre-defaulted No:15532::\"No\"}    Asiprin use counseling: {Aspirin :42881}    Recent Lab Results:       Lab Results   Component Value Date    HGBA1C 5.2 2017     Lab Results   Component Value Date    CHOL 104 04/15/2019    TRIG 184 (H) 04/15/2019    HDL 36 (L) 04/15/2019       Age-appropriate Screening Schedule:  Refer to the list below for future screening recommendations based on patient's age, sex and/or medical conditions. Orders for these recommended tests are listed in the plan section. The patient has been provided with a written plan.    Health Maintenance   Topic Date Due   • TDAP/TD VACCINES (1 - Tdap) 1976   • ZOSTER VACCINE (1 of 2) 2007   • COLONOSCOPY  2019   • INFLUENZA VACCINE  2019   • LIPID PANEL  04/15/2020       Immunizations reviewed and the patient was encouraged to update.  The patient {does/does not:52886}.       Subjective   History of Present Illness    Lavelle BURNS" "Stephen is a 64 y.o. male who presents for an Annual Wellness Visit.  History of Present Illness    The following portions of the patient's history were reviewed and updated as appropriate: {history reviewed:::\"allergies\",\"current medications\",\"past family history\",\"past medical history\",\"past social history\",\"past surgical history\",\"problem list\"}.    Outpatient Medications Prior to Visit   Medication Sig Dispense Refill   • atorvastatin (LIPITOR) 10 MG tablet TAKE 1 TABLET DAILY 90 tablet 1   • omeprazole (priLOSEC) 40 MG capsule Take 40 mg by mouth Daily.     • potassium chloride (K-DUR,KLOR-CON) 20 MEQ CR tablet      • SYNTHROID 88 MCG tablet        No facility-administered medications prior to visit.        Patient Active Problem List   Diagnosis   • Hyperlipidemia   • Hypertension   • Hypothyroidism   • Elevated alkaline phosphatase level   • Elevated liver function tests   • Stage 2 chronic kidney disease   • Hypomagnesemia   • Leukocytosis   • Anemia   • Bowel obstruction (CMS/HCC)   • Cholelithiasis with cholecystitis   • Dyslipidemia   • Malignant neoplasm of colon (CMS/HCC)   • Personal history of rectal cancer       Advance Care Planning:  {Advance Directive Status:27977}    Discussion with {patient/family:68039}regarding advanced directives. {[place] LIVING WILL:41033} form discussed at length and reviewed with patient. Patient states he {DOES_DOES NOT:03632} want CPR. Reviewed medical interventions with patient and the differences between each: Comfort, Limited and Full. Patient opted for {ACP Choices:16918}. Discussed the use of antibiotics at the end of life. He chose to {Use:18349} antibiotics consistent with treatment goals. Discussed artificially administered nutrition, patient is aware that if he is alert and oriented they can change their mind at any time. However, they have elected to have {Artificial Feedin}. Patient has identified his {Designee:55391} *** as his healthcare " representative. Patient encouraged to have a family meeting to discuss his/her decision regarding advanced care directives and goals of care.    In regard to the POST form:{[place] CHOICES:03253}  I spent {getime2:13323} discussing Advanced Care Planning information and documenting in the chart.    Identification of Risk Factors:  Risk factors include: {; WELLNESS RISK FACTORS:04070}.    Review of Systems   Constitutional: Negative for fatigue.   HENT: Negative for hearing loss.    Respiratory: Negative for shortness of breath.    Cardiovascular: Negative for chest pain and palpitations.   Genitourinary: Negative for frequency.        Nocturia   Musculoskeletal: Negative for joint swelling.   Neurological: Negative for memory problem.       Compared to one year ago, the patient feels his physical health is {better worse same:11025}.  Compared to one year ago, the patient feels his mental health is {better worse same:14954}.    Objective     Physical Exam    There were no vitals filed for this visit.    Patient's There is no height or weight on file to calculate BMI. BMI is {BMI range:94634}.      Assessment/Plan   Patient Self-Management and Personalized Health Advice  The patient has been provided with information about: {; PERSONALIZED HEALTH ADVICE:40625} and preventive services including:   · {plan:58856}.    Visit Diagnoses:  Problem List Items Addressed This Visit     None          Outpatient Encounter Medications as of 10/17/2019   Medication Sig Dispense Refill   • atorvastatin (LIPITOR) 10 MG tablet TAKE 1 TABLET DAILY 90 tablet 1   • omeprazole (priLOSEC) 40 MG capsule Take 40 mg by mouth Daily.     • potassium chloride (K-DUR,KLOR-CON) 20 MEQ CR tablet      • SYNTHROID 88 MCG tablet        No facility-administered encounter medications on file as of 10/17/2019.        Reviewed use of high risk medication in the elderly: {Response; yes/no/na:63}  Reviewed for potential of harmful drug interactions  in the elderly: {Response; yes/no/na:63}    Follow Up:  No Follow-up on file.     An After Visit Summary and PPPS with all of these plans were given to the patient.

## 2019-10-18 LAB
ALBUMIN SERPL-MCNC: 5.2 G/DL (ref 3.6–4.8)
ALBUMIN/GLOB SERPL: 2 {RATIO} (ref 1.2–2.2)
ALP SERPL-CCNC: 146 IU/L (ref 39–117)
ALT SERPL-CCNC: 71 IU/L (ref 0–44)
AST SERPL-CCNC: 39 IU/L (ref 0–40)
BASOPHILS # BLD AUTO: 0 X10E3/UL (ref 0–0.2)
BASOPHILS NFR BLD AUTO: 0 %
BILIRUB SERPL-MCNC: 0.8 MG/DL (ref 0–1.2)
BUN SERPL-MCNC: 14 MG/DL (ref 8–27)
BUN/CREAT SERPL: 11 (ref 10–24)
CALCIUM SERPL-MCNC: 9.2 MG/DL (ref 8.6–10.2)
CHLORIDE SERPL-SCNC: 101 MMOL/L (ref 96–106)
CHOLEST SERPL-MCNC: 168 MG/DL (ref 100–199)
CHOLEST/HDLC SERPL: 3.6 RATIO (ref 0–5)
CO2 SERPL-SCNC: 21 MMOL/L (ref 20–29)
CREAT SERPL-MCNC: 1.22 MG/DL (ref 0.76–1.27)
EOSINOPHIL # BLD AUTO: 0.1 X10E3/UL (ref 0–0.4)
EOSINOPHIL NFR BLD AUTO: 1 %
ERYTHROCYTE [DISTWIDTH] IN BLOOD BY AUTOMATED COUNT: 14.1 % (ref 12.3–15.4)
GLOBULIN SER CALC-MCNC: 2.6 G/DL (ref 1.5–4.5)
GLUCOSE SERPL-MCNC: 89 MG/DL (ref 65–99)
HCT VFR BLD AUTO: 45.6 % (ref 37.5–51)
HDLC SERPL-MCNC: 47 MG/DL
HGB BLD-MCNC: 15.1 G/DL (ref 13–17.7)
IMM GRANULOCYTES # BLD AUTO: 0.1 X10E3/UL (ref 0–0.1)
IMM GRANULOCYTES NFR BLD AUTO: 1 %
LDLC SERPL CALC-MCNC: ABNORMAL MG/DL (ref 0–99)
LYMPHOCYTES # BLD AUTO: 1.9 X10E3/UL (ref 0.7–3.1)
LYMPHOCYTES NFR BLD AUTO: 24 %
MAGNESIUM SERPL-MCNC: 1.1 MG/DL (ref 1.6–2.3)
MCH RBC QN AUTO: 30.3 PG (ref 26.6–33)
MCHC RBC AUTO-ENTMCNC: 33.1 G/DL (ref 31.5–35.7)
MCV RBC AUTO: 91 FL (ref 79–97)
MONOCYTES # BLD AUTO: 0.6 X10E3/UL (ref 0.1–0.9)
MONOCYTES NFR BLD AUTO: 7 %
NEUTROPHILS # BLD AUTO: 5.5 X10E3/UL (ref 1.4–7)
NEUTROPHILS NFR BLD AUTO: 67 %
PLATELET # BLD AUTO: 214 X10E3/UL (ref 150–450)
POTASSIUM SERPL-SCNC: 3.4 MMOL/L (ref 3.5–5.2)
PROT SERPL-MCNC: 7.8 G/DL (ref 6–8.5)
PSA SERPL-MCNC: 0.4 NG/ML (ref 0–4)
RBC # BLD AUTO: 4.99 X10E6/UL (ref 4.14–5.8)
SODIUM SERPL-SCNC: 140 MMOL/L (ref 134–144)
TRIGL SERPL-MCNC: 434 MG/DL (ref 0–149)
TSH SERPL DL<=0.005 MIU/L-ACNC: 2.27 UIU/ML (ref 0.45–4.5)
VLDLC SERPL CALC-MCNC: ABNORMAL MG/DL (ref 5–40)
WBC # BLD AUTO: 8.1 X10E3/UL (ref 3.4–10.8)

## 2019-10-18 NOTE — ASSESSMENT & PLAN NOTE
Hypertension is unchanged. Borderline poor control  Continue current treatment regimen.  Dietary sodium restriction.  Regular aerobic exercise.  Blood pressure will be reassessed at the next regular appointment.

## 2019-10-22 ENCOUNTER — OFFICE VISIT (OUTPATIENT)
Dept: FAMILY MEDICINE CLINIC | Facility: CLINIC | Age: 64
End: 2019-10-22

## 2019-10-22 VITALS
WEIGHT: 160.4 LBS | SYSTOLIC BLOOD PRESSURE: 132 MMHG | BODY MASS INDEX: 24.31 KG/M2 | RESPIRATION RATE: 18 BRPM | HEART RATE: 72 BPM | HEIGHT: 68 IN | TEMPERATURE: 98.2 F | DIASTOLIC BLOOD PRESSURE: 77 MMHG | OXYGEN SATURATION: 99 %

## 2019-10-22 DIAGNOSIS — K21.9 GASTROESOPHAGEAL REFLUX DISEASE, ESOPHAGITIS PRESENCE NOT SPECIFIED: ICD-10-CM

## 2019-10-22 DIAGNOSIS — H83.3X9 NOISE-INDUCED HEARING LOSS, UNSPECIFIED LATERALITY: ICD-10-CM

## 2019-10-22 DIAGNOSIS — R35.1 NOCTURIA: ICD-10-CM

## 2019-10-22 DIAGNOSIS — E78.2 MIXED HYPERLIPIDEMIA: Primary | ICD-10-CM

## 2019-10-22 DIAGNOSIS — R79.89 ELEVATED LIVER FUNCTION TESTS: ICD-10-CM

## 2019-10-22 DIAGNOSIS — Z00.00 ANNUAL PHYSICAL EXAM: ICD-10-CM

## 2019-10-22 DIAGNOSIS — Z12.5 SCREENING PSA (PROSTATE SPECIFIC ANTIGEN): ICD-10-CM

## 2019-10-22 DIAGNOSIS — K56.609 INTESTINAL OBSTRUCTION, UNSPECIFIED CAUSE, UNSPECIFIED WHETHER PARTIAL OR COMPLETE (HCC): ICD-10-CM

## 2019-10-22 DIAGNOSIS — D64.9 ANEMIA, UNSPECIFIED TYPE: ICD-10-CM

## 2019-10-22 DIAGNOSIS — C18.9 MALIGNANT NEOPLASM OF COLON, UNSPECIFIED PART OF COLON (HCC): ICD-10-CM

## 2019-10-22 DIAGNOSIS — R74.8 ELEVATED ALKALINE PHOSPHATASE LEVEL: ICD-10-CM

## 2019-10-22 DIAGNOSIS — E78.5 DYSLIPIDEMIA: ICD-10-CM

## 2019-10-22 DIAGNOSIS — Z85.048 PERSONAL HISTORY OF RECTAL CANCER: ICD-10-CM

## 2019-10-22 DIAGNOSIS — R17 ELEVATED BILIRUBIN: ICD-10-CM

## 2019-10-22 DIAGNOSIS — N40.0 BENIGN PROSTATIC HYPERPLASIA, UNSPECIFIED WHETHER LOWER URINARY TRACT SYMPTOMS PRESENT: ICD-10-CM

## 2019-10-22 DIAGNOSIS — I10 ESSENTIAL HYPERTENSION: ICD-10-CM

## 2019-10-22 DIAGNOSIS — K80.10 CALCULUS OF GALLBLADDER WITH CHOLECYSTITIS WITHOUT BILIARY OBSTRUCTION, UNSPECIFIED CHOLECYSTITIS ACUITY: ICD-10-CM

## 2019-10-22 DIAGNOSIS — R31.9 HEMATURIA, UNSPECIFIED TYPE: ICD-10-CM

## 2019-10-22 DIAGNOSIS — N18.2 STAGE 2 CHRONIC KIDNEY DISEASE: ICD-10-CM

## 2019-10-22 DIAGNOSIS — Z23 NEED FOR TDAP VACCINATION: ICD-10-CM

## 2019-10-22 DIAGNOSIS — I45.10 RIGHT BUNDLE BRANCH BLOCK: ICD-10-CM

## 2019-10-22 DIAGNOSIS — E03.8 OTHER SPECIFIED HYPOTHYROIDISM: ICD-10-CM

## 2019-10-22 DIAGNOSIS — Z85.818 HISTORY OF CANCER TONSIL: ICD-10-CM

## 2019-10-22 DIAGNOSIS — E87.6 HYPOKALEMIA: ICD-10-CM

## 2019-10-22 DIAGNOSIS — E83.42 HYPOMAGNESEMIA: ICD-10-CM

## 2019-10-22 DIAGNOSIS — K40.90 UNILATERAL INGUINAL HERNIA WITHOUT OBSTRUCTION OR GANGRENE, RECURRENCE NOT SPECIFIED: ICD-10-CM

## 2019-10-22 DIAGNOSIS — D72.829 LEUKOCYTOSIS, UNSPECIFIED TYPE: ICD-10-CM

## 2019-10-22 PROCEDURE — 90715 TDAP VACCINE 7 YRS/> IM: CPT | Performed by: FAMILY MEDICINE

## 2019-10-22 PROCEDURE — 90471 IMMUNIZATION ADMIN: CPT | Performed by: FAMILY MEDICINE

## 2019-10-22 PROCEDURE — 99214 OFFICE O/P EST MOD 30 MIN: CPT | Performed by: FAMILY MEDICINE

## 2019-10-22 PROCEDURE — 99396 PREV VISIT EST AGE 40-64: CPT | Performed by: FAMILY MEDICINE

## 2019-10-22 RX ORDER — OMEPRAZOLE 40 MG/1
40 CAPSULE, DELAYED RELEASE ORAL DAILY
Qty: 90 CAPSULE | Refills: 3 | Status: SHIPPED | OUTPATIENT
Start: 2019-10-22 | End: 2020-04-28 | Stop reason: SDUPTHER

## 2019-10-22 NOTE — ASSESSMENT & PLAN NOTE
Stable.  EKG done 10-17-19, read by me, reviewed with pt.  EKG showed NSR with Right bundle branch block.

## 2019-10-22 NOTE — PROGRESS NOTES
Subjective   Lavelle Mitchell is a 64 y.o. male here for his annual physical with me. Lavelle is here for coordination of medical care, to discuss health maintenance, disease prevention as well as to followup on medical problems. Patient has been followed by me since for years but was not seen from [id number], records prior to 2007 not available.. Patient's last CPE was 9-24-18. Activity level is moderate. Exercises 2 per week. Appetite is good. Feels well with few complaints. Energy level is good. Sleeps well. Patient's last colonoscopy was 3-2017. He is advised to repeat 3-20 per [facility].  Patient is doing routine self skin exam monthly. Patient is doing routine self-breast exams monthly. Patient is checking testicles monthly.    Lab Results   Component Value Date    PSA 0.4 10/17/2019        GI Problem   Primary symptoms do not include fever, weight loss, fatigue, abdominal pain, nausea, vomiting, diarrhea, dysuria, myalgias or rash. The illness began more than 7 days ago. The problem has not changed since onset.  The illness does not include chills, constipation or back pain. Significant associated medical issues include GERD. Associated medical issues do not include liver disease. Associated medical issues comments: hx. colon cancer.   Hypertension   This is a chronic problem. The current episode started more than 1 year ago. The problem is unchanged. The problem is controlled. Pertinent negatives include no blurred vision, chest pain, neck pain, palpitations or shortness of breath. There are no associated agents to hypertension. Risk factors for coronary artery disease include dyslipidemia. Current antihypertension treatment includes nothing. Identifiable causes of hypertension include chronic renal disease.   Heartburn   He reports no abdominal pain, no chest pain, no coughing, no nausea, no sore throat or no wheezing. This is a chronic problem. The current episode started more than 1 year ago. The problem  occurs occasionally. The problem has been gradually improving. Pertinent negatives include no fatigue or weight loss.   Hyperlipidemia   This is a chronic problem. The current episode started more than 1 year ago. The problem is uncontrolled. Recent lipid tests were reviewed and are high. Exacerbating diseases include chronic renal disease. He has no history of liver disease. Pertinent negatives include no chest pain, myalgias or shortness of breath. Current antihyperlipidemic treatment includes statins. There are no compliance problems.  Risk factors for coronary artery disease include hypertension.        The following portions of the patient's history were reviewed and updated as appropriate: allergies, past family history, past medical history, past social history, past surgical history and problem list.    Past Medical History:   Diagnosis Date   • Cancer (CMS/HCC)     Colon cancer   • Hyperlipidemia    • Hypertension    • Hypothyroidism        Family History   Problem Relation Age of Onset   • Heart disease Mother    • Hyperlipidemia Mother    • Diabetes Mother    • Cancer Mother         stomach   • Vision loss Mother         mother hole in her eye   • Heart disease Father    • Stroke Father    • Hyperlipidemia Father    • Other Father         colon polyps   • Hypertension Sister    • Diabetes Sister    • Birth defects Maternal Grandmother    • Birth defects Paternal Grandmother        Social History     Socioeconomic History   • Marital status:      Spouse name: Juan M   • Number of children: 2   • Years of education: Not on file   • Highest education level: Not on file   Occupational History   • Occupation: Retired     Employer: [employer]     Comment: 2-1-18   Social Needs   • Financial resource strain: Not hard at all   • Food insecurity:     Worry: Never true     Inability: Never true   • Transportation needs:     Medical: No     Non-medical: No   Tobacco Use   • Smoking status: Former  Smoker     Types: Pipe     Last attempt to quit: 2008     Years since quittin.3   • Smokeless tobacco: Never Used   Substance and Sexual Activity   • Alcohol use: Yes     Frequency: 2-4 times a month     Drinks per session: 1 or 2     Binge frequency: Never   • Drug use: No   Lifestyle   • Physical activity:     Days per week: 3 days     Minutes per session: 60 min   • Stress: Not at all       Past Surgical History:   Procedure Laterality Date   • CHOLECYSTECTOMY     • COLON SURGERY     • COLONOSCOPY     • LYMPH NODE BIOPSY         Current Outpatient Medications on File Prior to Visit   Medication Sig Dispense Refill   • atorvastatin (LIPITOR) 10 MG tablet TAKE 1 TABLET DAILY 90 tablet 1   • potassium chloride (K-DUR,KLOR-CON) 20 MEQ CR tablet      • SYNTHROID 88 MCG tablet      • [DISCONTINUED] magnesium chloride ER 64 MG DR tablet Take 64 mg by mouth Daily.       No current facility-administered medications on file prior to visit.        No Known Allergies    Review of Systems   Constitutional: Negative for appetite change, chills, fatigue, fever, unexpected weight gain and unexpected weight loss.   HENT: Negative for congestion, dental problem, ear discharge, ear pain, hearing loss, nosebleeds, postnasal drip, rhinorrhea, sinus pressure, sneezing, sore throat, tinnitus and voice change.         Last Dental exam  [facility] removed all his teeth   Eyes: Negative for blurred vision, double vision, pain, redness and visual disturbance.        Last Eye Exam 10-19-18, Dr. Copeland-doing well   Respiratory: Negative for cough, shortness of breath, wheezing and stridor.    Cardiovascular: Negative for chest pain, palpitations and leg swelling.   Gastrointestinal: Positive for GERD. Negative for abdominal pain, anal bleeding, blood in stool, constipation, diarrhea, nausea, rectal pain, vomiting and indigestion.        Colostomy   Endocrine: Negative for cold intolerance, heat intolerance, polydipsia,  "polyphagia and polyuria.        Sex Drive  He is a 0  She is a 0   Genitourinary: Negative for difficulty urinating, dysuria, frequency, hematuria and urgency.   Musculoskeletal: Negative for back pain, joint swelling, myalgias, neck pain and neck stiffness.   Skin: Negative for color change, dry skin and rash.   Neurological: Negative for dizziness, syncope, speech difficulty, weakness, light-headedness, headache and memory problem.   Hematological: Does not bruise/bleed easily.   Psychiatric/Behavioral: Negative for decreased concentration, sleep disturbance, depressed mood and stress. The patient is not nervous/anxious.        Objective   Visit Vitals  /77 (BP Location: Right arm, Patient Position: Sitting, Cuff Size: Adult)   Pulse 72   Temp 98.2 °F (36.8 °C) (Oral)   Resp 18   Ht 172.7 cm (68\")   Wt 72.8 kg (160 lb 6.4 oz)   SpO2 99%   BMI 24.39 kg/m²     Physical Exam   Constitutional: He is oriented to person, place, and time. He appears well-developed and well-nourished. No distress.   HENT:   Head: Normocephalic.   Right Ear: Hearing, tympanic membrane, external ear and ear canal normal.   Left Ear: Hearing, tympanic membrane, external ear and ear canal normal.   Nose: Nose normal.   Mouth/Throat: Oropharynx is clear and moist. Abnormal dentition. No oropharyngeal exudate.   No teeth   Eyes: Conjunctivae, EOM and lids are normal. Pupils are equal, round, and reactive to light. Right eye exhibits no discharge. Left eye exhibits no discharge. No scleral icterus.   Fundi by optometrist   Neck: Normal range of motion and full passive range of motion without pain. Neck supple.   Cardiovascular: Normal rate, regular rhythm, normal heart sounds and intact distal pulses.   No murmur heard.  Pulses:       Dorsalis pedis pulses are 1+ on the right side, and 0 on the left side.        Posterior tibial pulses are 0 on the right side, and 1+ on the left side.   Pulmonary/Chest: Effort normal and breath sounds " normal. He has no wheezes. He exhibits no tenderness.   Abdominal: Soft. Bowel sounds are normal. He exhibits no distension and no mass. There is no tenderness. A hernia is present. Hernia confirmed positive in the left inguinal area.   Mild-moderate left inguinal hernia.  Scar-left lower quad from epigastric to lower abdomen.  Scar-Right vertical scar from epigastric-suprapubic.   Genitourinary: Testes normal and penis normal. Circumcised. No penile tenderness.   Musculoskeletal: Normal range of motion. He exhibits no edema, tenderness or deformity.   Lymphadenopathy:     He has no cervical adenopathy.   Neurological: He is alert and oriented to person, place, and time. He has normal strength. He displays normal reflexes. No cranial nerve deficit or sensory deficit. He exhibits normal muscle tone. Coordination normal.   Skin: Skin is warm and dry. No rash noted. He is not diaphoretic. No erythema.   Onychomycosis- left 3rd mild and Right Great 4th and 5th mild-moderate.   Psychiatric: He has a normal mood and affect. His behavior is normal. Judgment and thought content normal.       Assessment/Plan   Problem List Items Addressed This Visit        Cardiovascular and Mediastinum    Hyperlipidemia - Primary    Current Assessment & Plan     Labs done 10-17-19, read by me, reviewed with pt.  Trig. 434 up from 184, Tot. Chol. 168 up from 104, HDL 47 up from 36, LDL not calculated.  Worsening  Encouraged to watch fatty intake, exercise more, and lose weight.   Compliant with medication   Is not getting adequate diet and exercise  Goals developed at last visit were not met because diet and exercise.  Follow up in 3 months  Care management needs are self-addressed. Self-management abilities addressed and patient is capable of managing his own disease.           Hypertension    Current Assessment & Plan     Doing well  Encouraged to watch salt, exercise more and lose weight.           Right bundle branch block    Current  Assessment & Plan     Stable.  EKG done 10-17-19, read by me, reviewed with pt.  EKG showed NSR with Right bundle branch block.            Digestive    Cholelithiasis with cholecystitis    Current Assessment & Plan     Probable cause of elevated Alk. Phos.           Malignant neoplasm of colon (CMS/HCC)    Current Assessment & Plan     Doing well         Personal history of rectal cancer    Current Assessment & Plan     Doing well         GERD (gastroesophageal reflux disease)    Current Assessment & Plan     Doing well         Relevant Medications    omeprazole (priLOSEC) 40 MG capsule       Endocrine    Hypothyroidism    Current Assessment & Plan     Doing well.  Labs done 10-17-19, read by me, reviewed with pt.  TSH was 2.270 up from 1.71.            Nervous and Auditory    Hearing loss    Current Assessment & Plan     Worse from 9-2018.  Audiogram done 10-17-19, read by me, reviewed with pt.  Audiogram shows hearing loss   Low frequency- Left is mild,  right is mild.  Speech- Left is moderate,  right is moderate.  High frequency left is moderate-severe,  right is moderate.  Pt. Advised to avoid noise exposure and wear hearing protection.              Immune and Lymphatic    Leukocytosis    Current Assessment & Plan     Improved.  Labs done 10-17-19, read by me, reviewed with pt.  WBC was 8.1 down from 12.6            Genitourinary    Stage 2 chronic kidney disease    Current Assessment & Plan     Doing well.  Labs done 10-17-19, read by me, reviewed with pt.  Creatinine and EGFR was normal.         BPH (benign prostatic hyperplasia)    Current Assessment & Plan     Doing well            Hematopoietic and Hemostatic    Anemia    Current Assessment & Plan     Improved.  Labs done 10-17-19, read by me, reviewed with pt.  Hemoglobin was normal.            Other    Elevated alkaline phosphatase level    Current Assessment & Plan     Worse, Labs done 10-17-19, read by me, reviewed with pt.  Alk Phos. Was 146 up from  116.  Pt. Sent for labs today and will call pt. With results.         Relevant Orders    Alkaline Phosphatase, Isoenzymes (Completed)    Elevated liver function tests    Current Assessment & Plan     Worse.  Labs done 10-17-19, read by me, reviewed with pt.  AST was 39 up from 34, ALT was 71 up from 60.  Pt. Sent for labs and will call pt. With results.         Hypomagnesemia    Current Assessment & Plan     Worse.  Labs done 10-17-19, read by me, reviewed with pt.  Magnesium was 1.1 down from 1.2.   Increase Magnesium to 4 daily.         Relevant Medications    magnesium chloride ER 64 MG DR tablet    Other Relevant Orders    Comprehensive Metabolic Panel (Completed)    Screening PSA (prostate specific antigen)    Current Assessment & Plan     Doing well.  Labs done 10-17-19, read by me, reviewed with pt.  PSA was 0.4         History of cancer tonsil    Current Assessment & Plan     Doing well         Unilateral inguinal hernia without obstruction or gangrene    Current Assessment & Plan     Stable         Hypokalemia    Current Assessment & Plan     Worse.  Labs done 10-17-19, read by me, reviewed with pt.  Potassium was 3.4 down from 4.0         Elevated bilirubin    Current Assessment & Plan     Improved.  Labs done 10-17-19, read by me, reviewed with pt.  Bilirubin was 0.8 up from 0.7         Hematuria    Current Assessment & Plan     Improved         Annual physical exam    Overview     Medical records thoroughly reviewed and summarized in emr, since last PE           Current Assessment & Plan     Encouraged to do self-breast exam, self-testicle exams, and self derm exams. Congratulated on using seat belts.  Encouraged to do annual physical exams.  Immunization status reviewed.             Other Visit Diagnoses     Intestinal obstruction, unspecified cause, unspecified whether partial or complete (CMS/[facility])        Nocturia        Dyslipidemia        Need for Tdap vaccination        Relevant Orders    Tdap  Vaccine Greater Than or Equal To 6yo IM (Completed)               Encouraged to check his skin, testicles and breasts monthly. Reviewed exercising regularly, eating a balanced diet, immunizations and if due, patient counselled to check with insurance company for coverage; and regularly checking skin and breasts.

## 2019-10-22 NOTE — ASSESSMENT & PLAN NOTE
Worse.  Labs done 10-17-19, read by me, reviewed with pt.  Magnesium was 1.1 down from 1.2.   Increase Magnesium to 4 daily.

## 2019-10-22 NOTE — ASSESSMENT & PLAN NOTE
Worse.  Labs done 10-17-19, read by me, reviewed with pt.  AST was 39 up from 34, ALT was 71 up from 60.  Pt. Sent for labs and will call pt. With results.

## 2019-10-22 NOTE — ASSESSMENT & PLAN NOTE
Worse, Labs done 10-17-19, read by me, reviewed with pt.  Alk Phos. Was 146 up from 116.  Pt. Sent for labs today and will call pt. With results.

## 2019-10-22 NOTE — ASSESSMENT & PLAN NOTE
Worse from 9-2018.  Audiogram done 10-17-19, read by me, reviewed with pt.  Audiogram shows hearing loss   Low frequency- Left is mild,  right is mild.  Speech- Left is moderate,  right is moderate.  High frequency left is moderate-severe,  right is moderate.  Pt. Advised to avoid noise exposure and wear hearing protection.

## 2019-10-22 NOTE — ASSESSMENT & PLAN NOTE
Labs done 10-17-19, read by me, reviewed with pt.  Trig. 434 up from 184, Tot. Chol. 168 up from 104, HDL 47 up from 36, LDL not calculated.  Worsening  Encouraged to watch fatty intake, exercise more, and lose weight.   Compliant with medication   Is not getting adequate diet and exercise  Goals developed at last visit were not met because diet and exercise.  Follow up in 3 months  Care management needs are self-addressed. Self-management abilities addressed and patient is capable of managing his own disease.

## 2019-10-24 LAB
ALBUMIN SERPL-MCNC: 4.8 G/DL (ref 3.6–4.8)
ALBUMIN/GLOB SERPL: 1.9 {RATIO} (ref 1.2–2.2)
ALP BONE CFR SERPL: 23 % (ref 12–68)
ALP INTEST CFR SERPL: 2 % (ref 0–18)
ALP LIVER CFR SERPL: 75 % (ref 13–88)
ALP SERPL-CCNC: 123 IU/L (ref 39–117)
ALT SERPL-CCNC: 51 IU/L (ref 0–44)
AST SERPL-CCNC: 29 IU/L (ref 0–40)
BILIRUB SERPL-MCNC: 0.6 MG/DL (ref 0–1.2)
BUN SERPL-MCNC: 14 MG/DL (ref 8–27)
BUN/CREAT SERPL: 12 (ref 10–24)
CALCIUM SERPL-MCNC: 9.6 MG/DL (ref 8.6–10.2)
CHLORIDE SERPL-SCNC: 104 MMOL/L (ref 96–106)
CO2 SERPL-SCNC: 23 MMOL/L (ref 20–29)
CREAT SERPL-MCNC: 1.16 MG/DL (ref 0.76–1.27)
GLOBULIN SER CALC-MCNC: 2.5 G/DL (ref 1.5–4.5)
GLUCOSE SERPL-MCNC: 91 MG/DL (ref 65–99)
POTASSIUM SERPL-SCNC: 3.4 MMOL/L (ref 3.5–5.2)
PROT SERPL-MCNC: 7.3 G/DL (ref 6–8.5)
SODIUM SERPL-SCNC: 143 MMOL/L (ref 134–144)

## 2019-10-27 RX ORDER — POTASSIUM CHLORIDE 20 MEQ/1
20 TABLET, EXTENDED RELEASE ORAL 2 TIMES DAILY
Qty: 60 TABLET | Refills: 5 | Status: SHIPPED | OUTPATIENT
Start: 2019-10-27 | End: 2020-04-01 | Stop reason: SDUPTHER

## 2019-10-27 RX ORDER — MAGNESIUM CHLORIDE 64 MG
64 TABLET, DELAYED RELEASE (ENTERIC COATED) ORAL DAILY
Qty: 120 TABLET | Refills: 5 | Status: SHIPPED | OUTPATIENT
Start: 2019-10-27 | End: 2020-04-28 | Stop reason: SDUPTHER

## 2019-11-07 ENCOUNTER — TELEPHONE (OUTPATIENT)
Dept: FAMILY MEDICINE CLINIC | Facility: CLINIC | Age: 64
End: 2019-11-07

## 2020-01-22 ENCOUNTER — OFFICE VISIT (OUTPATIENT)
Dept: FAMILY MEDICINE CLINIC | Facility: CLINIC | Age: 65
End: 2020-01-22

## 2020-01-22 VITALS
WEIGHT: 161.8 LBS | TEMPERATURE: 98 F | BODY MASS INDEX: 24.52 KG/M2 | HEIGHT: 68 IN | DIASTOLIC BLOOD PRESSURE: 72 MMHG | RESPIRATION RATE: 16 BRPM | SYSTOLIC BLOOD PRESSURE: 131 MMHG | OXYGEN SATURATION: 99 %

## 2020-01-22 DIAGNOSIS — E78.2 MIXED HYPERLIPIDEMIA: ICD-10-CM

## 2020-01-22 DIAGNOSIS — R74.8 ELEVATED ALKALINE PHOSPHATASE LEVEL: ICD-10-CM

## 2020-01-22 DIAGNOSIS — E83.42 HYPOMAGNESEMIA: ICD-10-CM

## 2020-01-22 DIAGNOSIS — E03.9 ACQUIRED HYPOTHYROIDISM: ICD-10-CM

## 2020-01-22 DIAGNOSIS — N18.2 STAGE 2 CHRONIC KIDNEY DISEASE: ICD-10-CM

## 2020-01-22 DIAGNOSIS — R79.89 ELEVATED LIVER FUNCTION TESTS: ICD-10-CM

## 2020-01-22 DIAGNOSIS — I10 ESSENTIAL HYPERTENSION: Primary | ICD-10-CM

## 2020-01-22 PROCEDURE — 99214 OFFICE O/P EST MOD 30 MIN: CPT | Performed by: FAMILY MEDICINE

## 2020-01-22 NOTE — ASSESSMENT & PLAN NOTE
Improved but not at goal; Trig 404 was 434,.   Encouraged to watch fatty intake, exercise more, and lose weight.   compliant with medication  - no side effects continue lipitor  Is not getting adequate diet and exercise  Goals developed at last visit were not met   Follow up in 3  months  Care management needs are self-addressed.  Self-management abilities addressed and patient is capable of managing his/her own disease.

## 2020-01-22 NOTE — PROGRESS NOTES
Subjective   Lavelle Mitchell is a 64 y.o. male.     Follow up labs    Hyperlipidemia   This is a chronic problem. The current episode started more than 1 year ago. The problem is controlled. There are no known factors aggravating his hyperlipidemia. Pertinent negatives include no chest pain, myalgias or shortness of breath. Current antihyperlipidemic treatment includes statins. The current treatment provides moderate improvement of lipids. Risk factors for coronary artery disease include dyslipidemia and hypertension.   Hypertension   This is a chronic problem. The current episode started more than 1 year ago. The problem has been gradually improving since onset. The problem is controlled. Pertinent negatives include no chest pain, palpitations or shortness of breath. There are no associated agents to hypertension. Risk factors for coronary artery disease include dyslipidemia.   Chronic Kidney Disease   This is a new problem. The current episode started more than 1 month ago. The problem has been gradually worsening. Pertinent negatives include no chest pain, fatigue, myalgias or nausea.        The following portions of the patient's history were reviewed and updated as appropriate: current medications, past family history, past medical history, past social history, past surgical history and problem list.    Family History   Problem Relation Age of Onset   • Heart disease Mother    • Hyperlipidemia Mother    • Diabetes Mother    • Cancer Mother         stomach   • Vision loss Mother         mother hole in her eye   • Heart disease Father    • Stroke Father    • Hyperlipidemia Father    • Other Father         colon polyps   • Hypertension Sister    • Diabetes Sister    • Birth defects Maternal Grandmother    • Birth defects Paternal Grandmother        Social History     Tobacco Use   • Smoking status: Former Smoker     Types: Pipe     Last attempt to quit: 2008     Years since quittin.5   • Smokeless  tobacco: Never Used   Substance Use Topics   • Alcohol use: Yes     Frequency: 2-4 times a month     Drinks per session: 1 or 2     Binge frequency: Never   • Drug use: No       Past Surgical History:   Procedure Laterality Date   • CHOLECYSTECTOMY     • COLON SURGERY     • COLONOSCOPY     • LYMPH NODE BIOPSY         Patient Active Problem List   Diagnosis   • Hyperlipidemia   • Hypertension   • Hypothyroidism   • Elevated alkaline phosphatase level   • Elevated liver function tests   • Stage 2 chronic kidney disease   • Hypomagnesemia   • Leukocytosis   • Anemia   • Cholelithiasis with cholecystitis   • Malignant neoplasm of colon (CMS/HCC)   • Personal history of rectal cancer   • Right bundle branch block   • Hearing loss   • Screening PSA (prostate specific antigen)   • History of cancer tonsil   • Unilateral inguinal hernia without obstruction or gangrene   • Hypokalemia   • GERD (gastroesophageal reflux disease)   • BPH (benign prostatic hyperplasia)   • Elevated bilirubin   • Hematuria   • Annual physical exam       Current Outpatient Medications on File Prior to Visit   Medication Sig Dispense Refill   • atorvastatin (LIPITOR) 10 MG tablet TAKE 1 TABLET DAILY 90 tablet 1   • magnesium chloride ER 64 MG DR tablet Take 1 tablet by mouth Daily. 120 tablet 5   • omeprazole (priLOSEC) 40 MG capsule Take 1 capsule by mouth Daily. 90 capsule 3   • potassium chloride (K-DUR,KLOR-CON) 20 MEQ CR tablet Take 1 tablet by mouth 2 (Two) Times a Day. 60 tablet 5   • SYNTHROID 88 MCG tablet        No current facility-administered medications on file prior to visit.        No Known Allergies    Review of Systems   Constitutional: Negative for fatigue, unexpected weight gain and unexpected weight loss.   Respiratory: Negative for shortness of breath.    Cardiovascular: Negative for chest pain, palpitations and leg swelling.   Gastrointestinal: Negative for nausea.   Musculoskeletal: Negative for myalgias.   Skin: Negative for  "dry skin.   Neurological: Negative for headache.       Objective   Visit Vitals  /72 (BP Location: Left arm, Patient Position: Sitting, Cuff Size: Adult)   Temp 98 °F (36.7 °C)   Resp 16   Ht 172.7 cm (68\")   Wt 73.4 kg (161 lb 12.8 oz)   SpO2 99%   BMI 24.60 kg/m²     Physical Exam   Constitutional: He is oriented to person, place, and time. He appears well-developed and well-nourished. He is cooperative.   HENT:   Head: Normocephalic.   Neck: Trachea normal. Neck supple. Carotid bruit is not present. No thyromegaly present.   Cardiovascular: Normal rate and regular rhythm. Exam reveals no gallop and no friction rub.   No murmur heard.  Pulmonary/Chest: Effort normal and breath sounds normal. No respiratory distress. He has no wheezes. He exhibits no tenderness.   Neurological: He is alert and oriented to person, place, and time.   Skin: Skin is dry. No rash noted. Nails show no clubbing.   Nursing note and vitals reviewed.        Assessment/Plan .  Problem List Items Addressed This Visit        Medium    Hyperlipidemia    Current Assessment & Plan     Improved but not at goal; Trig 404 was 434,.   Encouraged to watch fatty intake, exercise more, and lose weight.   compliant with medication  - no side effects continue lipitor  Is not getting adequate diet and exercise  Goals developed at last visit were not met   Follow up in 3  months  Care management needs are self-addressed.  Self-management abilities addressed and patient is capable of managing his/her own disease.           Relevant Orders    Lipid Panel With / Chol / HDL Ratio    Comprehensive Metabolic Panel    Hypertension - Primary    Current Assessment & Plan     Doing well; Encouraged to watch salt, exercise more and lose weight.              Unprioritized    Elevated alkaline phosphatase level    Current Assessment & Plan     Improving; Alk phos decreased from 123 to 119. Will follow conservatively.         Elevated liver function tests    Current " Assessment & Plan     Improving; ALT decreased from 51 to 46.         Hypomagnesemia    Current Assessment & Plan     Worsening;  Magnesium level decreased from 1.1 to 0.8. Advised patient to increase magnesium to 4 tablets a day.  Offered a referral to nephrology, patient declines at this time.         Relevant Orders    Magnesium    Hypothyroidism    Relevant Orders    TSH    CBC & Differential    Stage 2 chronic kidney disease    Current Assessment & Plan     Renal condition is worsening. GFR down to 55 from 67  Monitor daily weight. avoid anti inflam.  Declines neph consult  Renal condition will be reassessed in 3 months.

## 2020-01-22 NOTE — ASSESSMENT & PLAN NOTE
Worsening;  Magnesium level decreased from 1.1 to 0.8. Advised patient to increase magnesium to 4 tablets a day.  Offered a referral to nephrology, patient declines at this time.

## 2020-01-24 NOTE — ASSESSMENT & PLAN NOTE
Renal condition is worsening. GFR down to 55 from 67  Monitor daily weight. avoid anti inflam.  Declines neph consult  Renal condition will be reassessed in 3 months.

## 2020-01-25 DIAGNOSIS — E78.2 MIXED HYPERLIPIDEMIA: Primary | ICD-10-CM

## 2020-01-27 RX ORDER — ATORVASTATIN CALCIUM 10 MG/1
TABLET, FILM COATED ORAL
Qty: 90 TABLET | Refills: 4 | Status: SHIPPED | OUTPATIENT
Start: 2020-01-27 | End: 2020-04-28 | Stop reason: SDUPTHER

## 2020-02-15 DIAGNOSIS — E03.8 OTHER SPECIFIED HYPOTHYROIDISM: Primary | ICD-10-CM

## 2020-02-17 RX ORDER — LEVOTHYROXINE SODIUM 88 UG/1
TABLET ORAL
Qty: 90 TABLET | Refills: 4 | Status: SHIPPED | OUTPATIENT
Start: 2020-02-17 | End: 2020-04-28 | Stop reason: SDUPTHER

## 2020-03-26 ENCOUNTER — TELEPHONE (OUTPATIENT)
Dept: FAMILY MEDICINE CLINIC | Facility: CLINIC | Age: 65
End: 2020-03-26

## 2020-03-26 NOTE — TELEPHONE ENCOUNTER
Pt called and requested a refill on his potassium chloride 20mg and his magnesium chloride Er 64mg. [facility] and please also call patient and let them know if your sending them or what the outcome is.

## 2020-04-01 DIAGNOSIS — E87.6 HYPOKALEMIA: ICD-10-CM

## 2020-04-01 RX ORDER — POTASSIUM CHLORIDE 20 MEQ/1
20 TABLET, EXTENDED RELEASE ORAL 2 TIMES DAILY
Qty: 60 TABLET | Refills: 5 | Status: SHIPPED | OUTPATIENT
Start: 2020-04-01 | End: 2020-04-28 | Stop reason: SDUPTHER

## 2020-04-22 LAB
ALBUMIN SERPL-MCNC: 4.9 G/DL (ref 3.8–4.8)
ALBUMIN/GLOB SERPL: 2 {RATIO} (ref 1.2–2.2)
ALP SERPL-CCNC: 120 IU/L (ref 39–117)
ALT SERPL-CCNC: 49 IU/L (ref 0–44)
AST SERPL-CCNC: 31 IU/L (ref 0–40)
BASOPHILS # BLD AUTO: 0 X10E3/UL (ref 0–0.2)
BASOPHILS NFR BLD AUTO: 0 %
BILIRUB SERPL-MCNC: 0.7 MG/DL (ref 0–1.2)
BUN SERPL-MCNC: 15 MG/DL (ref 8–27)
BUN/CREAT SERPL: 12 (ref 10–24)
CALCIUM SERPL-MCNC: 9.7 MG/DL (ref 8.6–10.2)
CHLORIDE SERPL-SCNC: 102 MMOL/L (ref 96–106)
CHOLEST SERPL-MCNC: 158 MG/DL (ref 100–199)
CHOLEST/HDLC SERPL: 3.3 RATIO (ref 0–5)
CO2 SERPL-SCNC: 23 MMOL/L (ref 20–29)
CREAT SERPL-MCNC: 1.3 MG/DL (ref 0.76–1.27)
EOSINOPHIL # BLD AUTO: 0.1 X10E3/UL (ref 0–0.4)
EOSINOPHIL NFR BLD AUTO: 1 %
ERYTHROCYTE [DISTWIDTH] IN BLOOD BY AUTOMATED COUNT: 13.2 % (ref 11.6–15.4)
GLOBULIN SER CALC-MCNC: 2.5 G/DL (ref 1.5–4.5)
GLUCOSE SERPL-MCNC: 89 MG/DL (ref 65–99)
HCT VFR BLD AUTO: 43.6 % (ref 37.5–51)
HDLC SERPL-MCNC: 48 MG/DL
HGB BLD-MCNC: 14.2 G/DL (ref 13–17.7)
IMM GRANULOCYTES # BLD AUTO: 0.2 X10E3/UL (ref 0–0.1)
IMM GRANULOCYTES NFR BLD AUTO: 2 %
LDLC SERPL CALC-MCNC: 34 MG/DL (ref 0–99)
LYMPHOCYTES # BLD AUTO: 2.5 X10E3/UL (ref 0.7–3.1)
LYMPHOCYTES NFR BLD AUTO: 34 %
MAGNESIUM SERPL-MCNC: 1.2 MG/DL (ref 1.6–2.3)
MCH RBC QN AUTO: 29.8 PG (ref 26.6–33)
MCHC RBC AUTO-ENTMCNC: 32.6 G/DL (ref 31.5–35.7)
MCV RBC AUTO: 91 FL (ref 79–97)
MONOCYTES # BLD AUTO: 0.6 X10E3/UL (ref 0.1–0.9)
MONOCYTES NFR BLD AUTO: 8 %
NEUTROPHILS # BLD AUTO: 3.9 X10E3/UL (ref 1.4–7)
NEUTROPHILS NFR BLD AUTO: 55 %
PLATELET # BLD AUTO: 190 X10E3/UL (ref 150–450)
POTASSIUM SERPL-SCNC: 3.7 MMOL/L (ref 3.5–5.2)
PROT SERPL-MCNC: 7.4 G/DL (ref 6–8.5)
RBC # BLD AUTO: 4.77 X10E6/UL (ref 4.14–5.8)
SODIUM SERPL-SCNC: 140 MMOL/L (ref 134–144)
TRIGL SERPL-MCNC: 380 MG/DL (ref 0–149)
TSH SERPL DL<=0.005 MIU/L-ACNC: 3.39 UIU/ML (ref 0.45–4.5)
VLDLC SERPL CALC-MCNC: 76 MG/DL (ref 5–40)
WBC # BLD AUTO: 7.2 X10E3/UL (ref 3.4–10.8)

## 2020-04-28 ENCOUNTER — OFFICE VISIT (OUTPATIENT)
Dept: FAMILY MEDICINE CLINIC | Facility: CLINIC | Age: 65
End: 2020-04-28

## 2020-04-28 VITALS
OXYGEN SATURATION: 100 % | WEIGHT: 166.4 LBS | SYSTOLIC BLOOD PRESSURE: 136 MMHG | BODY MASS INDEX: 25.22 KG/M2 | HEART RATE: 86 BPM | TEMPERATURE: 98 F | DIASTOLIC BLOOD PRESSURE: 75 MMHG | RESPIRATION RATE: 15 BRPM | HEIGHT: 68 IN

## 2020-04-28 DIAGNOSIS — E78.2 MIXED HYPERLIPIDEMIA: ICD-10-CM

## 2020-04-28 DIAGNOSIS — E83.42 HYPOMAGNESEMIA: ICD-10-CM

## 2020-04-28 DIAGNOSIS — K21.9 GASTROESOPHAGEAL REFLUX DISEASE, ESOPHAGITIS PRESENCE NOT SPECIFIED: ICD-10-CM

## 2020-04-28 DIAGNOSIS — E87.6 HYPOKALEMIA: ICD-10-CM

## 2020-04-28 DIAGNOSIS — N18.2 STAGE 2 CHRONIC KIDNEY DISEASE: ICD-10-CM

## 2020-04-28 DIAGNOSIS — I10 HYPERTENSION, UNSPECIFIED TYPE: ICD-10-CM

## 2020-04-28 DIAGNOSIS — D50.8 OTHER IRON DEFICIENCY ANEMIA: ICD-10-CM

## 2020-04-28 DIAGNOSIS — E03.8 OTHER SPECIFIED HYPOTHYROIDISM: Primary | ICD-10-CM

## 2020-04-28 PROCEDURE — 99214 OFFICE O/P EST MOD 30 MIN: CPT | Performed by: FAMILY MEDICINE

## 2020-04-28 RX ORDER — POTASSIUM CHLORIDE 20 MEQ/1
20 TABLET, EXTENDED RELEASE ORAL 3 TIMES DAILY
Qty: 270 TABLET | Refills: 3 | Status: SHIPPED | OUTPATIENT
Start: 2020-04-28 | End: 2020-11-04 | Stop reason: SDUPTHER

## 2020-04-28 RX ORDER — MAGNESIUM CHLORIDE 64 MG
64 TABLET, DELAYED RELEASE (ENTERIC COATED) ORAL 2 TIMES DAILY
Qty: 180 TABLET | Refills: 3 | Status: SHIPPED | OUTPATIENT
Start: 2020-04-28 | End: 2020-11-04 | Stop reason: SDUPTHER

## 2020-04-28 RX ORDER — OMEPRAZOLE 20 MG/1
40 CAPSULE, DELAYED RELEASE ORAL DAILY
Qty: 90 CAPSULE | Refills: 3 | Status: SHIPPED | OUTPATIENT
Start: 2020-04-28 | End: 2020-11-04 | Stop reason: SDUPTHER

## 2020-04-28 RX ORDER — LEVOTHYROXINE SODIUM 88 UG/1
88 TABLET ORAL DAILY
Qty: 90 TABLET | Refills: 4
Start: 2020-04-28 | End: 2020-11-04 | Stop reason: SDUPTHER

## 2020-04-28 RX ORDER — ATORVASTATIN CALCIUM 10 MG/1
10 TABLET, FILM COATED ORAL DAILY
Qty: 90 TABLET | Refills: 4
Start: 2020-04-28 | End: 2020-11-04 | Stop reason: SDUPTHER

## 2020-04-28 NOTE — ASSESSMENT & PLAN NOTE
Slightly worse; Potassium level is 3.7 down from 4.1. Patient has been taking 2 potassium tabs a day, advised to increase to 3 a day.

## 2020-04-28 NOTE — ASSESSMENT & PLAN NOTE
Doing well; Hgb is 14.2. Denies epitaxis, hemoptisis, heartburn, blood in stool or black tarry stools

## 2020-04-28 NOTE — PROGRESS NOTES
Subjective   Lavelle Mitchell is a 65 y.o. male.     Hypothyroidism   This is a chronic problem. The current episode started more than 1 year ago. The problem occurs daily. The problem has been gradually improving. Pertinent negatives include no chest pain, fatigue, myalgias or nausea. Nothing aggravates the symptoms.   Hypertension   This is a chronic problem. The current episode started more than 1 year ago. The problem has been gradually improving since onset. The problem is controlled. Pertinent negatives include no chest pain, palpitations or shortness of breath.   Chronic Kidney Disease   This is a chronic problem. The current episode started more than 1 year ago. The problem occurs constantly. Pertinent negatives include no chest pain, fatigue, myalgias or nausea.   Hyperlipidemia   This is a chronic problem. The current episode started more than 1 year ago. The problem is controlled. Exacerbating diseases include hypothyroidism. Pertinent negatives include no chest pain, myalgias or shortness of breath. Current antihyperlipidemic treatment includes statins. The current treatment provides moderate improvement of lipids. Risk factors for coronary artery disease include dyslipidemia and hypertension.        The following portions of the patient's history were reviewed and updated as appropriate: current medications, past family history, past medical history, past social history, past surgical history and problem list.    Family History   Problem Relation Age of Onset   • Heart disease Mother    • Hyperlipidemia Mother    • Diabetes Mother    • Cancer Mother         stomach   • Vision loss Mother         mother hole in her eye   • Heart disease Father    • Stroke Father    • Hyperlipidemia Father    • Other Father         colon polyps   • Hypertension Sister    • Diabetes Sister    • Birth defects Maternal Grandmother    • Birth defects Paternal Grandmother        Social History     Tobacco Use   • Smoking  status: Former Smoker     Types: Pipe     Last attempt to quit: 2008     Years since quittin.8   • Smokeless tobacco: Never Used   Substance Use Topics   • Alcohol use: Yes     Frequency: 2-4 times a month     Drinks per session: 1 or 2     Binge frequency: Never   • Drug use: No       Past Surgical History:   Procedure Laterality Date   • CHOLECYSTECTOMY     • COLON SURGERY     • COLONOSCOPY     • LYMPH NODE BIOPSY         Patient Active Problem List   Diagnosis   • Hyperlipidemia   • Hypertension   • Hypothyroidism   • Elevated alkaline phosphatase level   • Elevated liver function tests   • Stage 2 chronic kidney disease   • Hypomagnesemia   • Leukocytosis   • Anemia   • Cholelithiasis with cholecystitis   • Malignant neoplasm of colon (CMS/HCC)   • Personal history of rectal cancer   • Right bundle branch block   • Hearing loss   • Screening PSA (prostate specific antigen)   • History of cancer tonsil   • Unilateral inguinal hernia without obstruction or gangrene   • Hypokalemia   • GERD (gastroesophageal reflux disease)   • BPH (benign prostatic hyperplasia)   • Elevated bilirubin   • Hematuria   • Annual physical exam       No current outpatient medications on file prior to visit.     No current facility-administered medications on file prior to visit.        No Known Allergies    Review of Systems   Constitutional: Negative for appetite change, fatigue, unexpected weight gain and unexpected weight loss.   Respiratory: Negative for shortness of breath.    Cardiovascular: Negative for chest pain, palpitations and leg swelling.   Gastrointestinal: Negative for nausea.   Endocrine: Negative for cold intolerance and heat intolerance.   Musculoskeletal: Negative for myalgias.   Skin: Negative for dry skin.   Neurological: Negative for headache.       Objective   Visit Vitals  /75 (BP Location: Left arm, Patient Position: Sitting, Cuff Size: Adult)   Pulse 86   Temp 98 °F (36.7 °C)   Resp 15   Ht  "172.7 cm (68\")   Wt 75.5 kg (166 lb 6.4 oz)   SpO2 100%   BMI 25.30 kg/m²     Physical Exam   Constitutional: He is oriented to person, place, and time. He appears well-developed and well-nourished. He is cooperative.   HENT:   Head: Normocephalic.   Neck: Trachea normal. Neck supple. Carotid bruit is not present. No thyromegaly present.   Cardiovascular: Normal rate and regular rhythm. Exam reveals no gallop and no friction rub.   No murmur heard.  Pulmonary/Chest: Effort normal and breath sounds normal. No respiratory distress. He has no wheezes. He exhibits no tenderness.   Neurological: He is alert and oriented to person, place, and time.   Skin: Skin is dry. No rash noted. Nails show no clubbing.   Nursing note and vitals reviewed.        Assessment/Plan .  Problem List Items Addressed This Visit        Medium    Anemia    Current Assessment & Plan     Doing well; Hgb is 14.2. Denies epitaxis, hemoptisis, heartburn, blood in stool or black tarry stools           Hyperlipidemia    Current Assessment & Plan     Improved. But not at goal;   Chol 158 was 144, Trig 380 was 404, LDL 34. HDL 48 was 44.  Encouraged to watch fatty intake, exercise more, and lose weight.   compliant with medication   Is not getting adequate diet and exercise  Goals developed at last visit were not met   Follow up in 6  months  Care management needs are self-addressed.  Self-management abilities addressed and patient is capable of managing his own disease.           Relevant Medications    atorvastatin (LIPITOR) 10 MG tablet    Hypertension    Current Assessment & Plan     Doing well; Encouraged to watch salt, exercise more and lose weight.           Hypothyroidism - Primary    Current Assessment & Plan     Doing well; TSH 3.390 up from 2.270         Relevant Medications    levothyroxine (SYNTHROID, LEVOTHROID) 88 MCG tablet    Stage 2 chronic kidney disease    Current Assessment & Plan     Improving; Creatinine 1.30 down from 1.37.      "       Unprioritized    GERD (gastroesophageal reflux disease)    Current Assessment & Plan     Doing well; Patient received Omeprazole 40mg  Tabs , he was taking 20mg. I recommended that he go back on 20mg if they work and patient states that they do.          Relevant Medications    omeprazole (priLOSEC) 20 MG capsule    Hypokalemia    Current Assessment & Plan     Slightly worse; Potassium level is 3.7 down from 4.1. Patient has been taking 2 potassium tabs a day, advised to increase to 3 a day.         Relevant Medications    potassium chloride (K-DUR,KLOR-CON) 20 MEQ CR tablet    Hypomagnesemia    Current Assessment & Plan     Magnesium level is 1.2. Advised to increase Magmesium to 2 tablets a day.         Relevant Medications    magnesium chloride ER 64 MG DR tablet

## 2020-04-28 NOTE — ASSESSMENT & PLAN NOTE
Doing well; Patient received Omeprazole 40mg  Tabs , he was taking 20mg. I recommended that he go back on 20mg if they work and patient states that they do.

## 2020-10-21 ENCOUNTER — OFFICE VISIT (OUTPATIENT)
Dept: FAMILY MEDICINE CLINIC | Facility: CLINIC | Age: 65
End: 2020-10-21

## 2020-10-21 VITALS
HEIGHT: 68 IN | WEIGHT: 158.8 LBS | OXYGEN SATURATION: 99 % | BODY MASS INDEX: 24.07 KG/M2 | SYSTOLIC BLOOD PRESSURE: 122 MMHG | HEART RATE: 72 BPM | TEMPERATURE: 97.8 F | DIASTOLIC BLOOD PRESSURE: 74 MMHG | RESPIRATION RATE: 15 BRPM

## 2020-10-21 DIAGNOSIS — Z09 NEED FOR IMMUNIZATION FOLLOW-UP: ICD-10-CM

## 2020-10-21 DIAGNOSIS — I45.10 RIGHT BUNDLE BRANCH BLOCK: ICD-10-CM

## 2020-10-21 DIAGNOSIS — E83.42 HYPOMAGNESEMIA: ICD-10-CM

## 2020-10-21 DIAGNOSIS — E03.8 OTHER SPECIFIED HYPOTHYROIDISM: ICD-10-CM

## 2020-10-21 DIAGNOSIS — H90.2 CONDUCTIVE HEARING LOSS, UNSPECIFIED LATERALITY: ICD-10-CM

## 2020-10-21 DIAGNOSIS — Z12.5 SCREENING PSA (PROSTATE SPECIFIC ANTIGEN): ICD-10-CM

## 2020-10-21 DIAGNOSIS — I10 ESSENTIAL HYPERTENSION: Primary | ICD-10-CM

## 2020-10-21 DIAGNOSIS — Z23 FLU VACCINE NEED: ICD-10-CM

## 2020-10-21 DIAGNOSIS — E78.2 MIXED HYPERLIPIDEMIA: ICD-10-CM

## 2020-10-21 DIAGNOSIS — C18.9 MALIGNANT NEOPLASM OF COLON, UNSPECIFIED PART OF COLON (HCC): ICD-10-CM

## 2020-10-21 PROCEDURE — 90472 IMMUNIZATION ADMIN EACH ADD: CPT | Performed by: FAMILY MEDICINE

## 2020-10-21 PROCEDURE — 90750 HZV VACC RECOMBINANT IM: CPT | Performed by: FAMILY MEDICINE

## 2020-10-21 PROCEDURE — 99213 OFFICE O/P EST LOW 20 MIN: CPT | Performed by: FAMILY MEDICINE

## 2020-10-21 PROCEDURE — 90471 IMMUNIZATION ADMIN: CPT | Performed by: FAMILY MEDICINE

## 2020-10-21 PROCEDURE — 90686 IIV4 VACC NO PRSV 0.5 ML IM: CPT | Performed by: FAMILY MEDICINE

## 2020-10-21 NOTE — PROGRESS NOTES
Subjective   Lavelle Mitchell is a 65 y.o. male.     Hypertension  This is a chronic problem. The current episode started more than 1 year ago. The problem has been gradually improving since onset. The problem is controlled. Pertinent negatives include no chest pain, palpitations or shortness of breath. Risk factors for coronary artery disease include dyslipidemia. Current antihypertension treatment includes beta blockers. The current treatment provides moderate improvement.   Heart Problem  This is a chronic problem. The current episode started more than 1 year ago. The problem has been unchanged. Pertinent negatives include no chest pain, fatigue or joint swelling.   Hearing Problem  This is a chronic problem. The current episode started more than 1 year ago. The problem occurs daily. The problem has been unchanged. Pertinent negatives include no chest pain, fatigue or joint swelling. Nothing aggravates the symptoms. He has tried nothing for the symptoms.        The following portions of the patient's history were reviewed and updated as appropriate: current medications, past family history, past medical history, past social history, past surgical history and problem list.    Family History   Problem Relation Age of Onset   • Heart disease Mother    • Hyperlipidemia Mother    • Diabetes Mother    • Cancer Mother         stomach   • Vision loss Mother         mother hole in her eye   • Heart disease Father    • Stroke Father    • Hyperlipidemia Father    • Other Father         colon polyps   • Hypertension Sister    • Diabetes Sister    • Birth defects Maternal Grandmother    • Birth defects Paternal Grandmother        Social History     Tobacco Use   • Smoking status: Former Smoker     Types: Pipe     Quit date: 2008     Years since quittin.3   • Smokeless tobacco: Never Used   Substance Use Topics   • Alcohol use: Yes     Frequency: 2-4 times a month     Drinks per session: 1 or 2     Binge frequency:  Never   • Drug use: No       Past Surgical History:   Procedure Laterality Date   • CHOLECYSTECTOMY     • COLON SURGERY     • COLONOSCOPY     • LYMPH NODE BIOPSY         Patient Active Problem List   Diagnosis   • Hyperlipidemia   • Hypertension   • Hypothyroidism   • Elevated alkaline phosphatase level   • Elevated liver function tests   • Stage 2 chronic kidney disease   • Hypomagnesemia   • Leukocytosis   • Anemia   • Cholelithiasis with cholecystitis   • Malignant neoplasm of colon (CMS/HCC)   • Personal history of rectal cancer   • Right bundle branch block   • Hearing loss   • Screening PSA (prostate specific antigen)   • History of cancer tonsil   • Unilateral inguinal hernia without obstruction or gangrene   • Hypokalemia   • GERD (gastroesophageal reflux disease)   • BPH (benign prostatic hyperplasia)   • Elevated bilirubin   • Hematuria   • Annual physical exam       Current Outpatient Medications on File Prior to Visit   Medication Sig Dispense Refill   • atorvastatin (LIPITOR) 10 MG tablet Take 1 tablet by mouth Daily. 90 tablet 4   • levothyroxine (SYNTHROID, LEVOTHROID) 88 MCG tablet Take 1 tablet by mouth Daily. 90 tablet 4   • magnesium chloride ER 64 MG DR tablet Take 1 tablet by mouth 2 (Two) Times a Day. 180 tablet 3   • omeprazole (priLOSEC) 20 MG capsule Take 2 capsules by mouth Daily. 90 capsule 3   • potassium chloride (K-DUR,KLOR-CON) 20 MEQ CR tablet Take 1 tablet by mouth 3 (Three) Times a Day. 270 tablet 3     No current facility-administered medications on file prior to visit.        No Known Allergies    Review of Systems   Constitutional: Negative for fatigue.   HENT: Negative for hearing loss.    Respiratory: Negative for shortness of breath.    Cardiovascular: Negative for chest pain and palpitations.   Genitourinary: Negative for frequency.   Musculoskeletal: Negative for joint swelling.   Neurological: Negative for memory problem.       Objective   Visit Vitals  /74 (BP  "Location: Left arm, Patient Position: Sitting, Cuff Size: Large Adult)   Pulse 72   Temp 97.8 °F (36.6 °C)   Resp 15   Ht 172.7 cm (68\")   Wt 72 kg (158 lb 12.8 oz)   SpO2 99%   BMI 24.15 kg/m²     Physical Exam  Vitals signs and nursing note reviewed.   Constitutional:       Appearance: He is well-developed.   HENT:      Head: Normocephalic.      Right Ear: Tympanic membrane, ear canal and external ear normal. No middle ear effusion. There is no impacted cerumen.      Left Ear: Tympanic membrane, ear canal and external ear normal.  No middle ear effusion. There is no impacted cerumen.      Nose: No septal deviation, mucosal edema or congestion.      Right Sinus: No maxillary sinus tenderness or frontal sinus tenderness.      Left Sinus: No maxillary sinus tenderness or frontal sinus tenderness.      Mouth/Throat:      Mouth: No oral lesions.      Pharynx: No oropharyngeal exudate.      Tonsils: No tonsillar abscesses.   Neck:      Musculoskeletal: Neck supple.      Thyroid: No thyromegaly.      Vascular: No carotid bruit.      Trachea: Trachea normal.   Cardiovascular:      Rate and Rhythm: Normal rate and regular rhythm.      Heart sounds: No murmur. No friction rub. No gallop.    Pulmonary:      Effort: Pulmonary effort is normal. No respiratory distress.      Breath sounds: Normal breath sounds. No wheezing.   Chest:      Chest wall: No tenderness.   Skin:     General: Skin is dry.      Findings: No rash.      Nails: There is no clubbing.     Neurological:      Mental Status: He is alert and oriented to person, place, and time.   Psychiatric:         Behavior: Behavior is cooperative.           Assessment/Plan .  Problem List Items Addressed This Visit        Medium    Hyperlipidemia    Current Assessment & Plan     Will draw labs and discuss results at next visit.         Relevant Orders    Lipid Panel With / Chol / HDL Ratio (Completed)    Comprehensive Metabolic Panel (Completed)    Hypertension - Primary    " Current Assessment & Plan     Doing well; Encouraged to watch salt, exercise more and lose weight.           Hypothyroidism    Current Assessment & Plan     Will draw labs and discuss results at next visit.         Relevant Orders    TSH (Completed)    Malignant neoplasm of colon (CMS/HCC)    Current Assessment & Plan     Will draw labs and discuss results at next visit.         Relevant Orders    CEA (Completed)       Low    Hypomagnesemia    Current Assessment & Plan     Will draw labs and discuss results at next visit.         Relevant Orders    Magnesium (Completed)    Right bundle branch block    Current Assessment & Plan     EKG done today and read by myself shows normal sinus rhythm with intraventricular conduction delay- stable from previous EKG.            Unprioritized    Hearing loss    Current Assessment & Plan     Advised to use ear protection and avoid noise exposure         Screening PSA (prostate specific antigen)    Current Assessment & Plan     Will draw labs and discuss results at next visit.         Relevant Orders    PSA Screen (Completed)      Other Visit Diagnoses     Need for immunization follow-up        Relevant Orders    Shingrix Vaccine (Completed)    Flu vaccine need        Relevant Orders    Fluarix/Fluzone/Afluria Quad>6 Months (Completed)

## 2020-10-21 NOTE — ASSESSMENT & PLAN NOTE
EKG done today and read by myself shows normal sinus rhythm with intraventricular conduction delay- stable from previous EKG.

## 2020-10-22 LAB
ALBUMIN SERPL-MCNC: 5 G/DL (ref 3.8–4.8)
ALBUMIN/GLOB SERPL: 1.9 {RATIO} (ref 1.2–2.2)
ALP SERPL-CCNC: 146 IU/L (ref 39–117)
ALT SERPL-CCNC: 46 IU/L (ref 0–44)
AST SERPL-CCNC: 29 IU/L (ref 0–40)
BILIRUB SERPL-MCNC: 0.9 MG/DL (ref 0–1.2)
BUN SERPL-MCNC: 16 MG/DL (ref 8–27)
BUN/CREAT SERPL: 12 (ref 10–24)
CALCIUM SERPL-MCNC: 10 MG/DL (ref 8.6–10.2)
CEA SERPL-MCNC: 3.3 NG/ML (ref 0–4.7)
CHLORIDE SERPL-SCNC: 106 MMOL/L (ref 96–106)
CHOLEST SERPL-MCNC: 159 MG/DL (ref 100–199)
CHOLEST/HDLC SERPL: 3.8 RATIO (ref 0–5)
CO2 SERPL-SCNC: 22 MMOL/L (ref 20–29)
CREAT SERPL-MCNC: 1.31 MG/DL (ref 0.76–1.27)
GLOBULIN SER CALC-MCNC: 2.7 G/DL (ref 1.5–4.5)
GLUCOSE SERPL-MCNC: 94 MG/DL (ref 65–99)
HDLC SERPL-MCNC: 42 MG/DL
LDLC SERPL CALC-MCNC: 46 MG/DL (ref 0–99)
MAGNESIUM SERPL-MCNC: 1.6 MG/DL (ref 1.6–2.3)
POTASSIUM SERPL-SCNC: 4 MMOL/L (ref 3.5–5.2)
PROT SERPL-MCNC: 7.7 G/DL (ref 6–8.5)
PSA SERPL-MCNC: 0.3 NG/ML (ref 0–4)
SODIUM SERPL-SCNC: 140 MMOL/L (ref 134–144)
TRIGL SERPL-MCNC: 491 MG/DL (ref 0–149)
TSH SERPL DL<=0.005 MIU/L-ACNC: 2.46 UIU/ML (ref 0.45–4.5)
VLDLC SERPL CALC-MCNC: 71 MG/DL (ref 5–40)

## 2020-11-04 ENCOUNTER — OFFICE VISIT (OUTPATIENT)
Dept: FAMILY MEDICINE CLINIC | Facility: CLINIC | Age: 65
End: 2020-11-04

## 2020-11-04 VITALS
WEIGHT: 158.6 LBS | HEART RATE: 64 BPM | SYSTOLIC BLOOD PRESSURE: 110 MMHG | TEMPERATURE: 97.7 F | RESPIRATION RATE: 18 BRPM | HEIGHT: 68 IN | OXYGEN SATURATION: 100 % | DIASTOLIC BLOOD PRESSURE: 74 MMHG | BODY MASS INDEX: 24.04 KG/M2

## 2020-11-04 DIAGNOSIS — R31.9 HEMATURIA, UNSPECIFIED TYPE: ICD-10-CM

## 2020-11-04 DIAGNOSIS — E87.6 HYPOKALEMIA: ICD-10-CM

## 2020-11-04 DIAGNOSIS — N18.31 STAGE 3A CHRONIC KIDNEY DISEASE (HCC): ICD-10-CM

## 2020-11-04 DIAGNOSIS — E03.8 OTHER SPECIFIED HYPOTHYROIDISM: ICD-10-CM

## 2020-11-04 DIAGNOSIS — N52.1 ERECTILE DYSFUNCTION DUE TO DISEASES CLASSIFIED ELSEWHERE: ICD-10-CM

## 2020-11-04 DIAGNOSIS — E78.2 MIXED HYPERLIPIDEMIA: ICD-10-CM

## 2020-11-04 DIAGNOSIS — H90.2 CONDUCTIVE HEARING LOSS, UNSPECIFIED LATERALITY: ICD-10-CM

## 2020-11-04 DIAGNOSIS — R74.8 ELEVATED ALKALINE PHOSPHATASE LEVEL: ICD-10-CM

## 2020-11-04 DIAGNOSIS — Z12.5 SCREENING PSA (PROSTATE SPECIFIC ANTIGEN): ICD-10-CM

## 2020-11-04 DIAGNOSIS — Z00.01 ENCOUNTER FOR GENERAL ADULT MEDICAL EXAMINATION WITH ABNORMAL FINDINGS: ICD-10-CM

## 2020-11-04 DIAGNOSIS — D50.8 OTHER IRON DEFICIENCY ANEMIA: ICD-10-CM

## 2020-11-04 DIAGNOSIS — I10 ESSENTIAL HYPERTENSION: ICD-10-CM

## 2020-11-04 DIAGNOSIS — E83.42 HYPOMAGNESEMIA: ICD-10-CM

## 2020-11-04 DIAGNOSIS — Z85.818 HISTORY OF CANCER TONSIL: ICD-10-CM

## 2020-11-04 DIAGNOSIS — K40.90 UNILATERAL INGUINAL HERNIA WITHOUT OBSTRUCTION OR GANGRENE, RECURRENCE NOT SPECIFIED: ICD-10-CM

## 2020-11-04 DIAGNOSIS — K21.9 GASTROESOPHAGEAL REFLUX DISEASE: ICD-10-CM

## 2020-11-04 DIAGNOSIS — N40.0 BENIGN PROSTATIC HYPERPLASIA, UNSPECIFIED WHETHER LOWER URINARY TRACT SYMPTOMS PRESENT: ICD-10-CM

## 2020-11-04 DIAGNOSIS — I45.10 RIGHT BUNDLE BRANCH BLOCK: ICD-10-CM

## 2020-11-04 DIAGNOSIS — R79.89 ELEVATED LIVER FUNCTION TESTS: ICD-10-CM

## 2020-11-04 DIAGNOSIS — K21.9 GASTROESOPHAGEAL REFLUX DISEASE, UNSPECIFIED WHETHER ESOPHAGITIS PRESENT: ICD-10-CM

## 2020-11-04 DIAGNOSIS — C18.9 MALIGNANT NEOPLASM OF COLON, UNSPECIFIED PART OF COLON (HCC): Primary | ICD-10-CM

## 2020-11-04 DIAGNOSIS — D72.829 LEUKOCYTOSIS, UNSPECIFIED TYPE: ICD-10-CM

## 2020-11-04 PROCEDURE — 99214 OFFICE O/P EST MOD 30 MIN: CPT | Performed by: FAMILY MEDICINE

## 2020-11-04 PROCEDURE — 99396 PREV VISIT EST AGE 40-64: CPT | Performed by: FAMILY MEDICINE

## 2020-11-04 RX ORDER — POTASSIUM CHLORIDE 20 MEQ/1
20 TABLET, EXTENDED RELEASE ORAL 4 TIMES DAILY
Qty: 360 TABLET | Refills: 3
Start: 2020-11-04 | End: 2021-11-16 | Stop reason: SDUPTHER

## 2020-11-04 RX ORDER — OMEPRAZOLE 20 MG/1
20 CAPSULE, DELAYED RELEASE ORAL DAILY
Qty: 90 CAPSULE | Refills: 3 | Status: SHIPPED | OUTPATIENT
Start: 2020-11-04 | End: 2021-11-01

## 2020-11-04 RX ORDER — ATORVASTATIN CALCIUM 10 MG/1
10 TABLET, FILM COATED ORAL DAILY
Qty: 90 TABLET | Refills: 4
Start: 2020-11-04 | End: 2021-04-26

## 2020-11-04 RX ORDER — OMEPRAZOLE 20 MG/1
20 CAPSULE, DELAYED RELEASE ORAL DAILY
Qty: 90 CAPSULE | Refills: 3 | Status: SHIPPED | OUTPATIENT
Start: 2020-11-04 | End: 2020-11-04

## 2020-11-04 RX ORDER — MAGNESIUM CHLORIDE 64 MG
64 TABLET, DELAYED RELEASE (ENTERIC COATED) ORAL 4 TIMES DAILY
Qty: 360 TABLET | Refills: 3
Start: 2020-11-04 | End: 2021-04-26

## 2020-11-04 RX ORDER — LEVOTHYROXINE SODIUM 88 UG/1
88 TABLET ORAL DAILY
Qty: 90 TABLET | Refills: 4
Start: 2020-11-04 | End: 2021-05-10

## 2020-11-04 NOTE — ASSESSMENT & PLAN NOTE
Doing well with supplementation.  Labs done 10-, read by me, reviewed with pt.  Magnesium was 1.6 up from 1.2.  Patient tolerated Magnesium well without side effects. I feel the benefits of the medication outweigh the risks.

## 2020-11-04 NOTE — PROGRESS NOTES
Subjective   Lavelle Mitchell is a 65 y.o. male here for his annual physical with me. Lavelle is here for coordination of medical care, to discuss health maintenance, disease prevention as well as to followup on medical problems. Patient has been followed by me since 2007. Patient's last CPE was 10-. Activity level is moderate. Exercises 4 per week. Appetite is good. Feels well with none complaints. Energy level is good. Sleeps well. Patient's last colonoscopy was 3-2017 with Dr. Portillo. He is advised to repeat in 3 years. Patient is doing routine self skin exam monthly. Patient is doing routine self-breast exams monthly. Patient is checking testicles monthly.    Lab Results   Component Value Date    PSA 0.3 10/21/2020        Colon Cancer  This is a chronic problem. The current episode started more than 1 year ago. The problem has been resolved. Pertinent negatives include no chest pain, fatigue or joint swelling. The treatment provided significant relief.   Hyperlipidemia  This is a chronic problem. The current episode started more than 1 year ago. The problem is controlled. Exacerbating diseases include hypothyroidism. Factors aggravating his hyperlipidemia include fatty foods. Pertinent negatives include no chest pain or shortness of breath. Current antihyperlipidemic treatment includes statins. Risk factors for coronary artery disease include dyslipidemia and hypertension.   Heartburn  He reports no chest pain. This is a chronic problem. The current episode started more than 1 year ago. The problem occurs occasionally. The problem has been unchanged. Pertinent negatives include no fatigue.   Hypothyroidism  This is a chronic problem. The current episode started more than 1 year ago. The problem occurs constantly. Pertinent negatives include no chest pain, fatigue or joint swelling.   Abnormal Lab  This is a chronic problem. The current episode started more than 1 year ago. The problem occurs constantly.  Pertinent negatives include no chest pain, fatigue or joint swelling.        The following portions of the patient's history were reviewed and updated as appropriate: allergies, current medications, past family history, past medical history, past social history, past surgical history and problem list.    Past Medical History:   Diagnosis Date   • Hyperlipidemia    • Hypertension    • Hypothyroidism        Family History   Problem Relation Age of Onset   • Heart disease Mother    • Hyperlipidemia Mother    • Diabetes Mother    • Cancer Mother         stomach   • Vision loss Mother         mother hole in her eye   • Heart disease Father    • Stroke Father    • Hyperlipidemia Father    • Other Father         colon polyps   • Hypertension Sister    • Diabetes Sister    • Birth defects Maternal Grandmother    • Birth defects Paternal Grandmother        Social History     Socioeconomic History   • Marital status:      Spouse name: Juan M   • Number of children: 2   • Years of education: Not on file   • Highest education level: Not on file   Occupational History   • Occupation: Retired     Employer: [employer]     Comment: 18   Social Needs   • Financial resource strain: Not hard at all   • Food insecurity     Worry: Never true     Inability: Never true   • Transportation needs     Medical: No     Non-medical: No   Tobacco Use   • Smoking status: Former Smoker     Types: Pipe     Quit date: 2008     Years since quittin.3   • Smokeless tobacco: Never Used   Substance and Sexual Activity   • Alcohol use: Yes     Frequency: 2-4 times a month     Drinks per session: 1 or 2     Binge frequency: Never   • Drug use: No   • Sexual activity: Defer   Lifestyle   • Physical activity     Days per week: 3 days     Minutes per session: 60 min   • Stress: Not at all   Social History Narrative     3 x .  First wife  for 7.5 years, 1 child with first wife.  2nd wife  5 years, no  "children with 2nd wife.  3rd wife  1996, no children together.  Just the 2 of them at home.  Retired 2-2018.   Caffeine 2 cups coffee daily, 2-3 sodas daily.  Exercise walking 2 miles 4 days weekly.  Always wears seatbelts.  Hobbies hunting.       Past Surgical History:   Procedure Laterality Date   • CHOLECYSTECTOMY     • COLON SURGERY     • COLONOSCOPY     • LYMPH NODE BIOPSY         No current outpatient medications on file prior to visit.     No current facility-administered medications on file prior to visit.        No Known Allergies    Review of Systems   Constitutional: Negative for fatigue.   HENT: Negative for hearing loss.         Last dental exam 2010 at [facility] when all teeth was removed   Eyes:        Last vision exam -DR. Burton-advised to follow   Respiratory: Negative for shortness of breath.    Cardiovascular: Negative for chest pain and palpitations.   Gastrointestinal: Positive for GERD.        Colostomy   Genitourinary: Positive for erectile dysfunction. Negative for frequency.        Nocturia   Musculoskeletal: Negative for joint swelling.   Neurological: Negative for memory problem.       Objective   Visit Vitals  /74 (BP Location: Left arm, Patient Position: Sitting, Cuff Size: Adult)   Pulse 64   Temp 97.7 °F (36.5 °C) (Temporal)   Resp 18   Ht 172.7 cm (68\")   Wt 71.9 kg (158 lb 9.6 oz)   SpO2 100%   BMI 24.12 kg/m²     Physical Exam  Constitutional:       General: He is not in acute distress.     Appearance: He is well-developed. He is not diaphoretic.   HENT:      Head: Normocephalic.      Right Ear: Hearing, tympanic membrane, ear canal and external ear normal.      Left Ear: Hearing, tympanic membrane, ear canal and external ear normal.      Nose: Nose normal.      Mouth/Throat:      Lips: Pink.      Mouth: Mucous membranes are moist.      Pharynx: Oropharynx is clear. No oropharyngeal exudate.   Eyes:      General: Lids are normal. No scleral icterus.        Right " eye: No discharge.         Left eye: No discharge.      Pupils: Pupils are equal, round, and reactive to light.      Comments: Wears glasses.  Fundi done by Optometrist.     Neck:      Musculoskeletal: Full passive range of motion without pain, normal range of motion and neck supple.      Trachea: Trachea normal.   Cardiovascular:      Rate and Rhythm: Normal rate and regular rhythm.      Pulses:           Dorsalis pedis pulses are 1+ on the right side and 0 on the left side.        Posterior tibial pulses are 0 on the right side and 0 on the left side.      Heart sounds: Normal heart sounds. No murmur.   Pulmonary:      Effort: Pulmonary effort is normal.      Breath sounds: Normal breath sounds. No wheezing.   Chest:      Chest wall: No tenderness.   Abdominal:      General: Bowel sounds are normal. There is no distension.      Palpations: Abdomen is soft. There is no mass.      Tenderness: There is no abdominal tenderness.      Hernia: A hernia is present. Hernia is present in the left inguinal area (mild).      Comments: Colostomy left lower abdomin   Genitourinary:     Penis: Normal and circumcised. No tenderness.       Scrotum/Testes: Normal.      Prostate: Normal.      Rectum: Normal.   Musculoskeletal: Normal range of motion.         General: No tenderness or deformity.   Lymphadenopathy:      Cervical: No cervical adenopathy.   Skin:     General: Skin is warm and dry.      Findings: No erythema or rash.      Comments: Onychomycosis left 3rd nail-mild and right Great 4th and 5th mild-moderate.  Scar  LLQ from epigastric to lower abdomin.  Scar  Right vertical scar from epigastric to suprapubic.   Neurological:      General: No focal deficit present.      Mental Status: He is alert and oriented to person, place, and time.      Cranial Nerves: Cranial nerves are intact. No cranial nerve deficit.      Sensory: Sensation is intact. No sensory deficit.      Motor: Motor function is intact. No abnormal muscle  tone.      Coordination: Coordination is intact. Coordination normal.      Gait: Gait is intact.      Deep Tendon Reflexes: Reflexes normal.   Psychiatric:         Behavior: Behavior normal.         Thought Content: Thought content normal.         Judgment: Judgment normal.         Assessment/Plan   Problem List Items Addressed This Visit        Medium    Anemia    Current Assessment & Plan     Will draw CBC with next labs         Hyperlipidemia    Current Assessment & Plan     Labs done 10-, read by me, reviewed with pt.  Trig. 491 up from 380, Tot. Chol. 159 up from 158, HDL 42 down from 48, LDL 46 up from 34.  Worse.  Patient tolerated Lipitor well without side effects. I feel the benefits of the medication outweigh the risks.  Encouraged to watch fatty intake, exercise more, and lose weight.   Compliant with medication.  Is not getting adequate diet and exercise  Goals developed at last visit were not met because diet and exercise  Follow up in 3 months  Care management needs are self-addressed. Self-management abilities addressed and patient is capable of managing his own disease.           Relevant Medications    atorvastatin (LIPITOR) 10 MG tablet    Other Relevant Orders    Comprehensive Metabolic Panel    Lipid Panel With / Chol / HDL Ratio    Hypertension    Current Assessment & Plan     Doing well  Encouraged to watch salt, exercise more and lose weight.           Hypothyroidism    Current Assessment & Plan     Doing well.  Labs done 10-, read by me, reviewed with pt.  TSH was 2.460 down from 3.390  Patient tolerated Synthroid well without side effects. I feel the benefits of the medication outweigh the risks.         Relevant Medications    levothyroxine (SYNTHROID, LEVOTHROID) 88 MCG tablet    Malignant neoplasm of colon (CMS/HCC) - Primary    Current Assessment & Plan     Followed with GI.---Labs done 10-, read by me, reviewed with pt.  CEA was 3.3  He will call gi to schedule  colonoscopy         Stage 3a chronic kidney disease    Current Assessment & Plan     Worse.  Labs done 10-, read by me, reviewed with pt.  Creatinine was 1.31 up from 1.30, EGFR was 58 same as last            Low    BPH (benign prostatic hyperplasia)    Current Assessment & Plan     Doing well         Elevated alkaline phosphatase level    Current Assessment & Plan     Worse but assymt.  Will order Fractionated Alk. Phos. With next labs.Labs done 10-, read by me, reviewed with pt.  Alk Phos. Was 146 up from 120         Relevant Orders    Alkaline Phosphatase, Isoenzymes    Elevated liver function tests    Current Assessment & Plan     Slightly Improved.  Labs done 10-, read by me, reviewed with pt.  AST was 29 down from 31, ALT was 46 down from 49         Gastroesophageal reflux disease    Current Assessment & Plan     Doing well on 40 mg Omeprazole, pt. Wants to try 20 mg.  Patient tolerated Omeprazole well without side effects. I feel the benefits of the medication outweigh the risks.         Relevant Medications    omeprazole (priLOSEC) 20 MG capsule    Hypokalemia    Current Assessment & Plan     Doing well with supplementation.  Labs done 10-, read by me, reviewed with pt.  Potassium was 4.0 up from 3.7.  Patient tolerated Potassium well without side effects. I feel the benefits of the medication outweigh the risks.         Relevant Medications    potassium chloride (K-DUR,KLOR-CON) 20 MEQ CR tablet    Hypomagnesemia    Current Assessment & Plan     Doing well with supplementation.  Labs done 10-, read by me, reviewed with pt.  Magnesium was 1.6 up from 1.2.  Patient tolerated Magnesium well without side effects. I feel the benefits of the medication outweigh the risks.         Relevant Medications    magnesium chloride ER 64 MG DR tablet    Right bundle branch block    Current Assessment & Plan     Stable.  EKG done 10-, read by me, reviewed with pt.  EKG showed NSR  with Intraventricular conduction delay.            Unprioritized    Encounter for general adult medical examination with abnormal findings    Overview     Medical records thoroughly reviewed and summarized in emr, since last PE           Current Assessment & Plan     Encouraged to do self-breast exam, self-testicle exams, and self derm exams. Congratulated on using seat belts.  Encouraged to do annual physical exams.  Immunization status reviewed. Due for 2nd shingix           Erectile dysfunction due to diseases classified elsewhere    Current Assessment & Plan     Pills do not work, declines pump or shot         Hearing loss    Current Assessment & Plan     Advised to wear hearing protection and avoid noise exposure         Hematuria    Current Assessment & Plan     Followed with Urology, per pt. He stated that Urologist said it was resolved.         History of cancer tonsil    Current Assessment & Plan     Doing well         Leukocytosis    Current Assessment & Plan     Will repeat with next labs         Relevant Orders    CBC w AUTO Differential    Screening PSA (prostate specific antigen)    Current Assessment & Plan     Doing well.  Labs done 10-, read by me, reviewed with pt.  PSA was 0.3 down from 0.4         Unilateral inguinal hernia without obstruction or gangrene    Current Assessment & Plan     Very small.  I advised pt. That there is a 1% chance he will need surgery                    Encouraged to check his skin, testicles and breasts monthly. Reviewed exercising regularly, eating a balanced diet, immunizations and if due, patient counselled to check with insurance company for coverage; and regularly checking skin and breasts.

## 2020-11-04 NOTE — ASSESSMENT & PLAN NOTE
Followed with GI.---Labs done 10-, read by me, reviewed with pt.  CEA was 3.3  He will call gi to schedule colonoscopy

## 2020-11-04 NOTE — ASSESSMENT & PLAN NOTE
Worse.  Labs done 10-, read by me, reviewed with pt.  Creatinine was 1.31 up from 1.30, EGFR was 58 same as last

## 2020-11-04 NOTE — ASSESSMENT & PLAN NOTE
Encouraged to do self-breast exam, self-testicle exams, and self derm exams. Congratulated on using seat belts.  Encouraged to do annual physical exams.  Immunization status reviewed. Due for 2nd shingix

## 2020-11-04 NOTE — ASSESSMENT & PLAN NOTE
Labs done 10-, read by me, reviewed with pt.  Trig. 491 up from 380, Tot. Chol. 159 up from 158, HDL 42 down from 48, LDL 46 up from 34.  Worse.  Patient tolerated Lipitor well without side effects. I feel the benefits of the medication outweigh the risks.  Encouraged to watch fatty intake, exercise more, and lose weight.   Compliant with medication.  Is not getting adequate diet and exercise  Goals developed at last visit were not met because diet and exercise  Follow up in 3 months  Care management needs are self-addressed. Self-management abilities addressed and patient is capable of managing his own disease.

## 2020-11-04 NOTE — ASSESSMENT & PLAN NOTE
Doing well.  Labs done 10-, read by me, reviewed with pt.  TSH was 2.460 down from 3.390  Patient tolerated Synthroid well without side effects. I feel the benefits of the medication outweigh the risks.

## 2020-11-04 NOTE — ASSESSMENT & PLAN NOTE
Worse but assymt.  Will order Fractionated Alk. Phos. With next labs.Labs done 10-, read by me, reviewed with pt.  Alk Phos. Was 146 up from 120

## 2020-11-04 NOTE — ASSESSMENT & PLAN NOTE
Doing well on 40 mg Omeprazole, pt. Wants to try 20 mg.  Patient tolerated Omeprazole well without side effects. I feel the benefits of the medication outweigh the risks.

## 2020-11-04 NOTE — ASSESSMENT & PLAN NOTE
Stable.  EKG done 10-, read by me, reviewed with pt.  EKG showed NSR with Intraventricular conduction delay.

## 2020-11-04 NOTE — ASSESSMENT & PLAN NOTE
Slightly Improved.  Labs done 10-, read by me, reviewed with pt.  AST was 29 down from 31, ALT was 46 down from 49

## 2020-11-04 NOTE — ASSESSMENT & PLAN NOTE
Doing well with supplementation.  Labs done 10-, read by me, reviewed with pt.  Potassium was 4.0 up from 3.7.  Patient tolerated Potassium well without side effects. I feel the benefits of the medication outweigh the risks.

## 2020-11-15 PROBLEM — Z82.3 FAMILY HISTORY OF STROKE: Status: ACTIVE | Noted: 2020-11-15

## 2020-11-15 PROBLEM — N18.31 STAGE 3A CHRONIC KIDNEY DISEASE: Status: ACTIVE | Noted: 2019-07-23

## 2020-11-15 PROBLEM — N52.1 ERECTILE DYSFUNCTION DUE TO DISEASES CLASSIFIED ELSEWHERE: Status: ACTIVE | Noted: 2020-11-15

## 2020-11-15 PROBLEM — Z83.3 FAMILY HISTORY OF DIABETES MELLITUS: Status: ACTIVE | Noted: 2020-11-15

## 2021-02-08 LAB
ALBUMIN SERPL-MCNC: 4.8 G/DL (ref 3.8–4.8)
ALBUMIN/GLOB SERPL: 1.8 {RATIO} (ref 1.2–2.2)
ALP BONE CFR SERPL: 30 % (ref 12–68)
ALP INTEST CFR SERPL: 3 % (ref 0–18)
ALP LIVER CFR SERPL: 67 % (ref 13–88)
ALP SERPL-CCNC: 140 IU/L (ref 39–117)
ALT SERPL-CCNC: 56 IU/L (ref 0–44)
AST SERPL-CCNC: 28 IU/L (ref 0–40)
BASOPHILS # BLD AUTO: 0.1 X10E3/UL (ref 0–0.2)
BASOPHILS NFR BLD AUTO: 1 %
BILIRUB SERPL-MCNC: 0.5 MG/DL (ref 0–1.2)
BUN SERPL-MCNC: 15 MG/DL (ref 8–27)
BUN/CREAT SERPL: 12 (ref 10–24)
CALCIUM SERPL-MCNC: 9.6 MG/DL (ref 8.6–10.2)
CHLORIDE SERPL-SCNC: 105 MMOL/L (ref 96–106)
CHOLEST SERPL-MCNC: 149 MG/DL (ref 100–199)
CHOLEST/HDLC SERPL: 3.5 RATIO (ref 0–5)
CO2 SERPL-SCNC: 22 MMOL/L (ref 20–29)
CREAT SERPL-MCNC: 1.24 MG/DL (ref 0.76–1.27)
EOSINOPHIL # BLD AUTO: 0.1 X10E3/UL (ref 0–0.4)
EOSINOPHIL NFR BLD AUTO: 1 %
ERYTHROCYTE [DISTWIDTH] IN BLOOD BY AUTOMATED COUNT: 13.6 % (ref 11.6–15.4)
GLOBULIN SER CALC-MCNC: 2.7 G/DL (ref 1.5–4.5)
GLUCOSE SERPL-MCNC: 91 MG/DL (ref 65–99)
HCT VFR BLD AUTO: 44.7 % (ref 37.5–51)
HDLC SERPL-MCNC: 42 MG/DL
HGB BLD-MCNC: 15 G/DL (ref 13–17.7)
IMM GRANULOCYTES # BLD AUTO: 0.1 X10E3/UL (ref 0–0.1)
IMM GRANULOCYTES NFR BLD AUTO: 1 %
LDLC SERPL CALC-MCNC: 43 MG/DL (ref 0–99)
LYMPHOCYTES # BLD AUTO: 2.6 X10E3/UL (ref 0.7–3.1)
LYMPHOCYTES NFR BLD AUTO: 33 %
MCH RBC QN AUTO: 29.6 PG (ref 26.6–33)
MCHC RBC AUTO-ENTMCNC: 33.6 G/DL (ref 31.5–35.7)
MCV RBC AUTO: 88 FL (ref 79–97)
MONOCYTES # BLD AUTO: 0.6 X10E3/UL (ref 0.1–0.9)
MONOCYTES NFR BLD AUTO: 8 %
NEUTROPHILS # BLD AUTO: 4.5 X10E3/UL (ref 1.4–7)
NEUTROPHILS NFR BLD AUTO: 56 %
PLATELET # BLD AUTO: 203 X10E3/UL (ref 150–450)
POTASSIUM SERPL-SCNC: 4 MMOL/L (ref 3.5–5.2)
PROT SERPL-MCNC: 7.5 G/DL (ref 6–8.5)
RBC # BLD AUTO: 5.06 X10E6/UL (ref 4.14–5.8)
SODIUM SERPL-SCNC: 140 MMOL/L (ref 134–144)
TRIGL SERPL-MCNC: 442 MG/DL (ref 0–149)
VLDLC SERPL CALC-MCNC: 64 MG/DL (ref 5–40)
WBC # BLD AUTO: 8 X10E3/UL (ref 3.4–10.8)

## 2021-02-09 ENCOUNTER — OFFICE VISIT (OUTPATIENT)
Dept: FAMILY MEDICINE CLINIC | Facility: CLINIC | Age: 66
End: 2021-02-09

## 2021-02-09 VITALS
SYSTOLIC BLOOD PRESSURE: 127 MMHG | HEART RATE: 88 BPM | OXYGEN SATURATION: 98 % | RESPIRATION RATE: 15 BRPM | WEIGHT: 162 LBS | DIASTOLIC BLOOD PRESSURE: 76 MMHG | BODY MASS INDEX: 24.55 KG/M2 | TEMPERATURE: 97.7 F | HEIGHT: 68 IN

## 2021-02-09 DIAGNOSIS — R79.89 ELEVATED LIVER FUNCTION TESTS: ICD-10-CM

## 2021-02-09 DIAGNOSIS — E03.8 OTHER SPECIFIED HYPOTHYROIDISM: ICD-10-CM

## 2021-02-09 DIAGNOSIS — N18.31 STAGE 3A CHRONIC KIDNEY DISEASE (HCC): ICD-10-CM

## 2021-02-09 DIAGNOSIS — I10 ESSENTIAL HYPERTENSION: ICD-10-CM

## 2021-02-09 DIAGNOSIS — Z23 NEED FOR SHINGLES VACCINE: ICD-10-CM

## 2021-02-09 DIAGNOSIS — E78.2 MIXED HYPERLIPIDEMIA: Primary | ICD-10-CM

## 2021-02-09 DIAGNOSIS — D72.829 LEUKOCYTOSIS, UNSPECIFIED TYPE: ICD-10-CM

## 2021-02-09 DIAGNOSIS — R74.8 ELEVATED ALKALINE PHOSPHATASE LEVEL: ICD-10-CM

## 2021-02-09 PROCEDURE — 90471 IMMUNIZATION ADMIN: CPT | Performed by: FAMILY MEDICINE

## 2021-02-09 PROCEDURE — 99214 OFFICE O/P EST MOD 30 MIN: CPT | Performed by: FAMILY MEDICINE

## 2021-02-09 PROCEDURE — 90750 HZV VACC RECOMBINANT IM: CPT | Performed by: FAMILY MEDICINE

## 2021-02-09 NOTE — PROGRESS NOTES
Subjective   Lavelle Mitchell is a 65 y.o. male.     Hyperlipidemia  This is a chronic problem. The current episode started more than 1 year ago. The problem is controlled. Exacerbating diseases include hypothyroidism. Pertinent negatives include no chest pain, myalgias or shortness of breath. Current antihyperlipidemic treatment includes statins. Risk factors for coronary artery disease include dyslipidemia and hypertension.   Hypertension  This is a chronic problem. The current episode started more than 1 year ago. The problem has been gradually improving since onset. The problem is controlled. Pertinent negatives include no chest pain, palpitations or shortness of breath. Past treatments include nothing. Current antihypertension treatment includes nothing.   Hypothyroidism  This is a chronic problem. The current episode started more than 1 year ago. The problem occurs daily. The problem has been gradually improving. Pertinent negatives include no chest pain, fatigue, myalgias or nausea.        The following portions of the patient's history were reviewed and updated as appropriate: current medications, past family history, past medical history, past social history, past surgical history and problem list.    Family History   Problem Relation Age of Onset   • Heart disease Mother    • Hyperlipidemia Mother    • Diabetes Mother    • Cancer Mother         stomach   • Vision loss Mother         mother hole in her eye   • Heart disease Father    • Stroke Father    • Hyperlipidemia Father    • Other Father         colon polyps   • Hypertension Sister    • Diabetes Sister    • Birth defects Maternal Grandmother    • Birth defects Paternal Grandmother        Social History     Tobacco Use   • Smoking status: Former Smoker     Types: Pipe     Quit date: 2008     Years since quittin.6   • Smokeless tobacco: Never Used   Substance Use Topics   • Alcohol use: Yes     Frequency: 2-4 times a month     Drinks per  session: 1 or 2     Binge frequency: Never   • Drug use: No       Past Surgical History:   Procedure Laterality Date   • CHOLECYSTECTOMY     • COLON SURGERY     • COLONOSCOPY     • LYMPH NODE BIOPSY         Patient Active Problem List   Diagnosis   • Hyperlipidemia   • Hypertension   • Hypothyroidism   • Elevated alkaline phosphatase level   • Elevated liver function tests   • Stage 3a chronic kidney disease   • Hypomagnesemia   • Leukocytosis   • Anemia   • Malignant neoplasm of colon (CMS/HCC)   • Right bundle branch block   • Hearing loss   • Screening PSA (prostate specific antigen)   • History of cancer tonsil   • Unilateral inguinal hernia without obstruction or gangrene   • Hypokalemia   • Gastroesophageal reflux disease   • BPH (benign prostatic hyperplasia)   • Hematuria   • Encounter for general adult medical examination with abnormal findings   • Family history of stroke   • Family history of diabetes mellitus   • Erectile dysfunction due to diseases classified elsewhere       Current Outpatient Medications on File Prior to Visit   Medication Sig Dispense Refill   • atorvastatin (LIPITOR) 10 MG tablet Take 1 tablet by mouth Daily. 90 tablet 4   • levothyroxine (SYNTHROID, LEVOTHROID) 88 MCG tablet Take 1 tablet by mouth Daily. 90 tablet 4   • magnesium chloride ER 64 MG DR tablet Take 1 tablet by mouth 4 (Four) Times a Day. 360 tablet 3   • omeprazole (priLOSEC) 20 MG capsule Take 1 capsule by mouth Daily. 90 capsule 3   • potassium chloride (K-DUR,KLOR-CON) 20 MEQ CR tablet Take 1 tablet by mouth 4 (Four) Times a Day. 360 tablet 3     No current facility-administered medications on file prior to visit.        No Known Allergies    Review of Systems   Constitutional: Negative for fatigue, unexpected weight gain and unexpected weight loss.   Respiratory: Negative for shortness of breath.    Cardiovascular: Negative for chest pain, palpitations and leg swelling.   Gastrointestinal: Negative for nausea.  "  Musculoskeletal: Negative for myalgias.   Skin: Negative for dry skin.   Neurological: Negative for headache.       Objective   Visit Vitals  /76 (BP Location: Right arm, Patient Position: Sitting, Cuff Size: Large Adult)   Pulse 88   Temp 97.7 °F (36.5 °C)   Resp 15   Ht 172.7 cm (68\")   Wt 73.5 kg (162 lb)   SpO2 98%   BMI 24.63 kg/m²     Physical Exam  Vitals signs and nursing note reviewed.   Constitutional:       Appearance: He is well-developed.   HENT:      Head: Normocephalic.   Neck:      Musculoskeletal: Neck supple.      Thyroid: No thyromegaly.      Vascular: No carotid bruit.      Trachea: Trachea normal.   Cardiovascular:      Rate and Rhythm: Normal rate and regular rhythm.      Heart sounds: No murmur. No friction rub. No gallop.    Pulmonary:      Effort: Pulmonary effort is normal. No respiratory distress.      Breath sounds: Normal breath sounds. No wheezing.   Chest:      Chest wall: No tenderness.   Skin:     General: Skin is dry.      Findings: No rash.      Nails: There is no clubbing.     Neurological:      Mental Status: He is alert and oriented to person, place, and time.   Psychiatric:         Behavior: Behavior is cooperative.           Assessment/Plan .  Problem List Items Addressed This Visit        Medium    Hyperlipidemia - Primary    Current Assessment & Plan     Improved. Cho 149 down from 159, Trig 442 down from 491, HDL 42 same as previous, LDL 43 down from 46.  Encouraged to watch fatty intake, exercise more, and lose weight.   compliant with medication  Patient tolerated lipitor well without side effects. I feel the benefits of the medication outweigh the risks.    Is not getting adequate diet and exercise  Goals developed at last visit were not met due to triglic  Follow up in 4-5  months  Care management needs are self-addressed. . Self-management abilities addressed and patient is capable of managing his own disease.           Relevant Orders    Lipid Panel With / " Chol / HDL Ratio    Comprehensive Metabolic Panel    Hypertension    Current Assessment & Plan     Doing well; Encouraged to watch salt, exercise more and lose weight.           Hypothyroidism    Current Assessment & Plan     Doing well; Patient tolerated synthroid well without side effects. I feel the benefits of the medication outweigh the risks.           Relevant Orders    TSH    Stage 3a chronic kidney disease    Current Assessment & Plan     Improving; Creatinine 1.24 down from1.31            Low    Elevated alkaline phosphatase level    Current Assessment & Plan     Improving-Patient is  Asymptomatic-Alk phos 140 down from 145. Fractionated showed liver 67%, bone 30% and intestinal 3%.         Elevated liver function tests    Current Assessment & Plan     Sllightly worse- AST 28 down from 29, ALT 56 up from 46.  Follow consrvatively            Unprioritized    Leukocytosis    Current Assessment & Plan     Resolved; WBC 8.0 which is normal.          Relevant Orders    CBC & Differential      Other Visit Diagnoses     Need for shingles vaccine        Relevant Orders    Shingrix Vaccine (Completed)

## 2021-02-09 NOTE — ASSESSMENT & PLAN NOTE
Improved. Cho 149 down from 159, Trig 442 down from 491, HDL 42 same as previous, LDL 43 down from 46.  Encouraged to watch fatty intake, exercise more, and lose weight.   compliant with medication  Patient tolerated lipitor well without side effects. I feel the benefits of the medication outweigh the risks.    Is not getting adequate diet and exercise  Goals developed at last visit were not met due to triglic  Follow up in 4-5  months  Care management needs are self-addressed. . Self-management abilities addressed and patient is capable of managing his own disease.

## 2021-02-09 NOTE — ASSESSMENT & PLAN NOTE
Doing well; Patient tolerated synthroid well without side effects. I feel the benefits of the medication outweigh the risks.

## 2021-02-09 NOTE — ASSESSMENT & PLAN NOTE
Improving-Patient is  Asymptomatic-Alk phos 140 down from 145. Fractionated showed liver 67%, bone 30% and intestinal 3%.

## 2021-04-23 DIAGNOSIS — E78.2 MIXED HYPERLIPIDEMIA: ICD-10-CM

## 2021-04-23 DIAGNOSIS — E83.42 HYPOMAGNESEMIA: ICD-10-CM

## 2021-04-27 RX ORDER — ATORVASTATIN CALCIUM 10 MG/1
TABLET, FILM COATED ORAL
Qty: 90 TABLET | Refills: 0 | Status: SHIPPED | OUTPATIENT
Start: 2021-04-27 | End: 2021-07-26

## 2021-04-27 RX ORDER — LANOLIN ALCOHOL/MO/W.PET/CERES
CREAM (GRAM) TOPICAL
Qty: 180 TABLET | Refills: 0 | Status: SHIPPED | OUTPATIENT
Start: 2021-04-27 | End: 2021-07-26

## 2021-05-03 ENCOUNTER — TELEPHONE (OUTPATIENT)
Dept: FAMILY MEDICINE CLINIC | Facility: CLINIC | Age: 66
End: 2021-05-03

## 2021-05-10 DIAGNOSIS — E03.8 OTHER SPECIFIED HYPOTHYROIDISM: ICD-10-CM

## 2021-05-10 RX ORDER — LEVOTHYROXINE SODIUM 88 UG/1
TABLET ORAL
Qty: 90 TABLET | Refills: 1 | Status: SHIPPED | OUTPATIENT
Start: 2021-05-10 | End: 2021-11-09

## 2021-07-02 LAB
ALBUMIN SERPL-MCNC: 4.7 G/DL (ref 3.8–4.8)
ALBUMIN/GLOB SERPL: 2 {RATIO} (ref 1.2–2.2)
ALP SERPL-CCNC: 127 IU/L (ref 48–121)
ALT SERPL-CCNC: 49 IU/L (ref 0–44)
AST SERPL-CCNC: 30 IU/L (ref 0–40)
BASOPHILS # BLD AUTO: 0 X10E3/UL (ref 0–0.2)
BASOPHILS NFR BLD AUTO: 0 %
BILIRUB SERPL-MCNC: 0.8 MG/DL (ref 0–1.2)
BUN SERPL-MCNC: 13 MG/DL (ref 8–27)
BUN/CREAT SERPL: 11 (ref 10–24)
CALCIUM SERPL-MCNC: 9.2 MG/DL (ref 8.6–10.2)
CHLORIDE SERPL-SCNC: 105 MMOL/L (ref 96–106)
CHOLEST SERPL-MCNC: 148 MG/DL (ref 100–199)
CHOLEST/HDLC SERPL: 3.5 RATIO (ref 0–5)
CO2 SERPL-SCNC: 23 MMOL/L (ref 20–29)
CREAT SERPL-MCNC: 1.19 MG/DL (ref 0.76–1.27)
EOSINOPHIL # BLD AUTO: 0.1 X10E3/UL (ref 0–0.4)
EOSINOPHIL NFR BLD AUTO: 1 %
ERYTHROCYTE [DISTWIDTH] IN BLOOD BY AUTOMATED COUNT: 13.5 % (ref 11.6–15.4)
GLOBULIN SER CALC-MCNC: 2.3 G/DL (ref 1.5–4.5)
GLUCOSE SERPL-MCNC: 88 MG/DL (ref 65–99)
HCT VFR BLD AUTO: 42.8 % (ref 37.5–51)
HDLC SERPL-MCNC: 42 MG/DL
HGB BLD-MCNC: 13.9 G/DL (ref 13–17.7)
IMM GRANULOCYTES # BLD AUTO: 0.1 X10E3/UL (ref 0–0.1)
IMM GRANULOCYTES NFR BLD AUTO: 1 %
LDLC SERPL CALC-MCNC: 51 MG/DL (ref 0–99)
LYMPHOCYTES # BLD AUTO: 2.3 X10E3/UL (ref 0.7–3.1)
LYMPHOCYTES NFR BLD AUTO: 33 %
MCH RBC QN AUTO: 29.4 PG (ref 26.6–33)
MCHC RBC AUTO-ENTMCNC: 32.5 G/DL (ref 31.5–35.7)
MCV RBC AUTO: 91 FL (ref 79–97)
MONOCYTES # BLD AUTO: 0.6 X10E3/UL (ref 0.1–0.9)
MONOCYTES NFR BLD AUTO: 9 %
NEUTROPHILS # BLD AUTO: 3.9 X10E3/UL (ref 1.4–7)
NEUTROPHILS NFR BLD AUTO: 56 %
PLATELET # BLD AUTO: 167 X10E3/UL (ref 150–450)
POTASSIUM SERPL-SCNC: 3.8 MMOL/L (ref 3.5–5.2)
PROT SERPL-MCNC: 7 G/DL (ref 6–8.5)
RBC # BLD AUTO: 4.73 X10E6/UL (ref 4.14–5.8)
SODIUM SERPL-SCNC: 140 MMOL/L (ref 134–144)
TRIGL SERPL-MCNC: 368 MG/DL (ref 0–149)
TSH SERPL DL<=0.005 MIU/L-ACNC: 2.07 UIU/ML (ref 0.45–4.5)
VLDLC SERPL CALC-MCNC: 55 MG/DL (ref 5–40)
WBC # BLD AUTO: 7.1 X10E3/UL (ref 3.4–10.8)

## 2021-07-08 ENCOUNTER — OFFICE VISIT (OUTPATIENT)
Dept: FAMILY MEDICINE CLINIC | Facility: CLINIC | Age: 66
End: 2021-07-08

## 2021-07-08 VITALS
HEIGHT: 68 IN | TEMPERATURE: 97.5 F | BODY MASS INDEX: 24.16 KG/M2 | SYSTOLIC BLOOD PRESSURE: 119 MMHG | HEART RATE: 76 BPM | DIASTOLIC BLOOD PRESSURE: 70 MMHG | OXYGEN SATURATION: 98 % | WEIGHT: 159.4 LBS | RESPIRATION RATE: 15 BRPM

## 2021-07-08 DIAGNOSIS — E83.42 HYPOMAGNESEMIA: ICD-10-CM

## 2021-07-08 DIAGNOSIS — R74.8 ELEVATED ALKALINE PHOSPHATASE LEVEL: ICD-10-CM

## 2021-07-08 DIAGNOSIS — E78.2 MIXED HYPERLIPIDEMIA: Primary | ICD-10-CM

## 2021-07-08 DIAGNOSIS — N18.31 STAGE 3A CHRONIC KIDNEY DISEASE (HCC): ICD-10-CM

## 2021-07-08 DIAGNOSIS — R79.89 ELEVATED LIVER FUNCTION TESTS: ICD-10-CM

## 2021-07-08 DIAGNOSIS — E03.8 OTHER SPECIFIED HYPOTHYROIDISM: ICD-10-CM

## 2021-07-08 DIAGNOSIS — I10 ESSENTIAL HYPERTENSION: ICD-10-CM

## 2021-07-08 PROCEDURE — 99214 OFFICE O/P EST MOD 30 MIN: CPT | Performed by: FAMILY MEDICINE

## 2021-07-08 NOTE — ASSESSMENT & PLAN NOTE
Doing well; TSH 2.070.  Patient tolerated Synthroid well without side effects. I feel the benefits of the medication outweigh the risks.

## 2021-07-08 NOTE — ASSESSMENT & PLAN NOTE
Doing well; Encouraged to watch salt, exercise more and lose weight.  Patient tolerated potassium well without side effects. I feel the benefits of the medication outweigh the risks.

## 2021-07-08 NOTE — PROGRESS NOTES
Subjective   Lavelle Mitchell is a 66 y.o. male.     Hypertension  This is a chronic problem. The current episode started more than 1 year ago. The problem has been gradually improving since onset. The problem is controlled. Pertinent negatives include no chest pain, palpitations or shortness of breath. Risk factors for coronary artery disease include dyslipidemia.   Hyperlipidemia  This is a chronic problem. The current episode started more than 1 year ago. The problem is controlled. Exacerbating diseases include hypothyroidism. Pertinent negatives include no chest pain, myalgias or shortness of breath. Current antihyperlipidemic treatment includes statins. The current treatment provides moderate improvement of lipids. Risk factors for coronary artery disease include dyslipidemia and hypertension.   Hypothyroidism  This is a chronic problem. The current episode started more than 1 year ago. The problem has been gradually improving. Pertinent negatives include no chest pain, fatigue, myalgias or nausea.        The following portions of the patient's history were reviewed and updated as appropriate: current medications, past family history, past medical history, past social history, past surgical history and problem list.    Family History   Problem Relation Age of Onset   • Heart disease Mother    • Hyperlipidemia Mother    • Diabetes Mother    • Cancer Mother         stomach   • Vision loss Mother         mother hole in her eye   • Heart disease Father    • Stroke Father    • Hyperlipidemia Father    • Other Father         colon polyps   • Hypertension Sister    • Diabetes Sister    • Birth defects Maternal Grandmother    • Birth defects Paternal Grandmother        Social History     Tobacco Use   • Smoking status: Former Smoker     Types: Pipe     Quit date: 2008     Years since quittin.0   • Smokeless tobacco: Never Used   Substance Use Topics   • Alcohol use: Yes   • Drug use: No       Past Surgical  History:   Procedure Laterality Date   • CHOLECYSTECTOMY     • COLON SURGERY     • COLONOSCOPY     • LYMPH NODE BIOPSY         Patient Active Problem List   Diagnosis   • Hyperlipidemia   • Hypertension   • Hypothyroidism   • Elevated alkaline phosphatase level   • Elevated liver function tests   • Stage 3a chronic kidney disease   • Hypomagnesemia   • Leukocytosis   • Anemia   • Malignant neoplasm of colon (CMS/HCC)   • Right bundle branch block   • Hearing loss   • Screening PSA (prostate specific antigen)   • History of cancer tonsil   • Unilateral inguinal hernia without obstruction or gangrene   • Hypokalemia   • Gastroesophageal reflux disease   • BPH (benign prostatic hyperplasia)   • Hematuria   • Encounter for general adult medical examination with abnormal findings   • Family history of stroke   • Family history of diabetes mellitus   • Erectile dysfunction due to diseases classified elsewhere       Current Outpatient Medications on File Prior to Visit   Medication Sig Dispense Refill   • atorvastatin (LIPITOR) 10 MG tablet TAKE 1 TABLET DAILY 90 tablet 0   • levothyroxine (SYNTHROID, LEVOTHROID) 88 MCG tablet TAKE 1 TABLET DAILY ON AN EMPTY STOMACH AT LEAST 30 MINUTES BEFORE FOOD 90 tablet 1   • MagDelay 64 MG DR tablet TAKE 1 TABLET TWICE A  tablet 0   • omeprazole (priLOSEC) 20 MG capsule Take 1 capsule by mouth Daily. 90 capsule 3   • potassium chloride (K-DUR,KLOR-CON) 20 MEQ CR tablet Take 1 tablet by mouth 4 (Four) Times a Day. 360 tablet 3     No current facility-administered medications on file prior to visit.       No Known Allergies    Review of Systems   Constitutional: Negative for fatigue, unexpected weight gain and unexpected weight loss.   Respiratory: Negative for shortness of breath.    Cardiovascular: Negative for chest pain, palpitations and leg swelling.   Gastrointestinal: Negative for nausea.   Musculoskeletal: Negative for myalgias.   Skin: Negative for dry skin.  "  Neurological: Negative for headache.       Objective   Visit Vitals  /70 (BP Location: Left arm, Patient Position: Sitting, Cuff Size: Adult)   Pulse 76   Temp 97.5 °F (36.4 °C)   Resp 15   Ht 172.7 cm (68\")   Wt 72.3 kg (159 lb 6.4 oz)   SpO2 98%   BMI 24.24 kg/m²     Physical Exam  Vitals and nursing note reviewed.   Constitutional:       Appearance: He is well-developed.   HENT:      Head: Normocephalic.   Neck:      Thyroid: No thyromegaly.      Vascular: No carotid bruit.      Trachea: Trachea normal.   Cardiovascular:      Rate and Rhythm: Normal rate and regular rhythm.      Heart sounds: No murmur heard.   No friction rub. No gallop.    Pulmonary:      Effort: Pulmonary effort is normal. No respiratory distress.      Breath sounds: Normal breath sounds. No wheezing.   Chest:      Chest wall: No tenderness.   Musculoskeletal:      Cervical back: Neck supple.   Skin:     General: Skin is dry.      Findings: No rash.      Nails: There is no clubbing.   Neurological:      Mental Status: He is alert and oriented to person, place, and time.   Psychiatric:         Behavior: Behavior is cooperative.           Assessment/Plan .  Problem List Items Addressed This Visit        Medium    Hyperlipidemia - Primary    Current Assessment & Plan     Improved. Close to goal  Encouraged to watch fatty intake, exercise more, and lose weight. compliant with medication Patient tolerated lipitor well without side effects. I feel the benefits of the medication outweigh the risks.    Is not getting adequate diet and exercise  Goals developed at last visit were not met   Follow up in 4 months  Care management needs are self-addressed.Self-management abilities addressed and patient is capable of managing his own disease.           Hypertension    Current Assessment & Plan     Doing well; Encouraged to watch salt, exercise more and lose weight.  Patient tolerated potassium well without side effects. I feel the benefits of the " medication outweigh the risks.           Hypothyroidism    Current Assessment & Plan     Doing well; TSH 2.070.  Patient tolerated Synthroid well without side effects. I feel the benefits of the medication outweigh the risks.           Stage 3a chronic kidney disease    Current Assessment & Plan     Resolved- Creatinine 1.09 and eGFR 64            Low    Elevated alkaline phosphatase level    Current Assessment & Plan     Improving- Alk Phos 127 down from 140. Patient is asymptomatic.          Elevated liver function tests    Current Assessment & Plan     Improving- AST 30, ALT 49.         Hypomagnesemia    Current Assessment & Plan     Check magnesium with next labs

## 2021-07-26 DIAGNOSIS — E78.2 MIXED HYPERLIPIDEMIA: ICD-10-CM

## 2021-07-26 DIAGNOSIS — E83.42 HYPOMAGNESEMIA: ICD-10-CM

## 2021-07-26 RX ORDER — ATORVASTATIN CALCIUM 10 MG/1
TABLET, FILM COATED ORAL
Qty: 90 TABLET | Refills: 3 | Status: SHIPPED | OUTPATIENT
Start: 2021-07-26 | End: 2021-11-16 | Stop reason: SDUPTHER

## 2021-07-26 RX ORDER — LANOLIN ALCOHOL/MO/W.PET/CERES
CREAM (GRAM) TOPICAL
Qty: 180 TABLET | Refills: 3 | Status: SHIPPED | OUTPATIENT
Start: 2021-07-26 | End: 2021-11-16 | Stop reason: SDUPTHER

## 2021-11-01 DIAGNOSIS — K21.9 GASTROESOPHAGEAL REFLUX DISEASE: ICD-10-CM

## 2021-11-01 RX ORDER — OMEPRAZOLE 20 MG/1
CAPSULE, DELAYED RELEASE ORAL
Qty: 90 CAPSULE | Refills: 0 | Status: SHIPPED | OUTPATIENT
Start: 2021-11-01 | End: 2021-11-16 | Stop reason: SDUPTHER

## 2021-11-08 DIAGNOSIS — E03.8 OTHER SPECIFIED HYPOTHYROIDISM: ICD-10-CM

## 2021-11-09 ENCOUNTER — OFFICE VISIT (OUTPATIENT)
Dept: FAMILY MEDICINE CLINIC | Facility: CLINIC | Age: 66
End: 2021-11-09

## 2021-11-09 VITALS
RESPIRATION RATE: 18 BRPM | SYSTOLIC BLOOD PRESSURE: 125 MMHG | TEMPERATURE: 97.2 F | OXYGEN SATURATION: 97 % | HEIGHT: 68 IN | BODY MASS INDEX: 23.58 KG/M2 | DIASTOLIC BLOOD PRESSURE: 82 MMHG | WEIGHT: 155.6 LBS | HEART RATE: 74 BPM

## 2021-11-09 DIAGNOSIS — E83.42 HYPOMAGNESEMIA: ICD-10-CM

## 2021-11-09 DIAGNOSIS — Z12.5 SCREENING PSA (PROSTATE SPECIFIC ANTIGEN): ICD-10-CM

## 2021-11-09 DIAGNOSIS — E03.8 OTHER SPECIFIED HYPOTHYROIDISM: ICD-10-CM

## 2021-11-09 DIAGNOSIS — D50.8 OTHER IRON DEFICIENCY ANEMIA: ICD-10-CM

## 2021-11-09 DIAGNOSIS — K21.9 GASTROESOPHAGEAL REFLUX DISEASE WITHOUT ESOPHAGITIS: ICD-10-CM

## 2021-11-09 DIAGNOSIS — C18.9 MALIGNANT NEOPLASM OF COLON, UNSPECIFIED PART OF COLON (HCC): Primary | ICD-10-CM

## 2021-11-09 DIAGNOSIS — Z23 NEED FOR INFLUENZA VACCINATION: ICD-10-CM

## 2021-11-09 DIAGNOSIS — Z23 NEED FOR PNEUMOCOCCAL VACCINE: ICD-10-CM

## 2021-11-09 DIAGNOSIS — E78.2 MIXED HYPERLIPIDEMIA: ICD-10-CM

## 2021-11-09 DIAGNOSIS — I10 PRIMARY HYPERTENSION: ICD-10-CM

## 2021-11-09 PROCEDURE — 90472 IMMUNIZATION ADMIN EACH ADD: CPT | Performed by: FAMILY MEDICINE

## 2021-11-09 PROCEDURE — 90674 CCIIV4 VAC NO PRSV 0.5 ML IM: CPT | Performed by: FAMILY MEDICINE

## 2021-11-09 PROCEDURE — 90732 PPSV23 VACC 2 YRS+ SUBQ/IM: CPT | Performed by: FAMILY MEDICINE

## 2021-11-09 PROCEDURE — 90471 IMMUNIZATION ADMIN: CPT | Performed by: FAMILY MEDICINE

## 2021-11-09 PROCEDURE — 99214 OFFICE O/P EST MOD 30 MIN: CPT | Performed by: FAMILY MEDICINE

## 2021-11-09 RX ORDER — LEVOTHYROXINE SODIUM 88 UG/1
TABLET ORAL
Qty: 90 TABLET | Refills: 0 | Status: SHIPPED | OUTPATIENT
Start: 2021-11-09 | End: 2021-11-16 | Stop reason: SDUPTHER

## 2021-11-09 NOTE — PROGRESS NOTES
Subjective   Lavelle Mitchell is a 66 y.o. male.   Chief Complaint   Patient presents with   • Hypertension   • Colon Cancer     Hypertension  This is a chronic problem. The current episode started more than 1 year ago. The problem is unchanged. The problem is controlled. Pertinent negatives include no chest pain, palpitations or shortness of breath. There are no associated agents to hypertension. Risk factors for coronary artery disease include dyslipidemia. The current treatment provides significant improvement. There are no compliance problems.    Colon Cancer  This is a chronic problem. The current episode started more than 1 year ago. The problem occurs constantly. The problem has been resolved. Pertinent negatives include no chest pain, fatigue or joint swelling. Nothing aggravates the symptoms. The treatment provided significant relief.        The following portions of the patient's history were reviewed and updated as appropriate: allergies, current medications, past family history, past medical history, past social history, past surgical history and problem list.    Past Medical History:   Diagnosis Date   • Hyperlipidemia    • Hypertension    • Hypothyroidism        Past Surgical History:   Procedure Laterality Date   • CHOLECYSTECTOMY     • COLON SURGERY     • COLONOSCOPY     • LYMPH NODE BIOPSY          Family History   Problem Relation Age of Onset   • Heart disease Mother    • Hyperlipidemia Mother    • Diabetes Mother    • Cancer Mother         stomach   • Vision loss Mother         mother hole in her eye   • Heart disease Father    • Stroke Father    • Hyperlipidemia Father    • Other Father         colon polyps   • Hypertension Sister    • Diabetes Sister    • Birth defects Maternal Grandmother    • Birth defects Paternal Grandmother         Social History     Socioeconomic History   • Marital status:      Spouse name: Juan M   • Number of children: 2   Tobacco Use   • Smoking status: Former  "Smoker     Types: Pipe     Quit date: 2008     Years since quittin.3   • Smokeless tobacco: Never Used   Substance and Sexual Activity   • Alcohol use: Yes     Comment: 1-2 drinks monthly   • Drug use: No   • Sexual activity: Defer         Review of Systems   Constitutional: Negative for fatigue.   HENT: Negative for hearing loss.    Respiratory: Negative for shortness of breath.    Cardiovascular: Negative for chest pain and palpitations.   Genitourinary: Negative for frequency.        Nocturia   Musculoskeletal: Negative for joint swelling.   Neurological: Negative for memory problem.       Objective   Visit Vitals  /82 (BP Location: Right arm, Patient Position: Sitting, Cuff Size: Adult)   Pulse 74   Temp 97.2 °F (36.2 °C) (Temporal)   Resp 18   Ht 172.7 cm (68\")   Wt 70.6 kg (155 lb 9.6 oz)   SpO2 97%   BMI 23.66 kg/m²     Physical Exam  Vitals and nursing note reviewed.   Constitutional:       Appearance: He is well-developed.   HENT:      Head: Normocephalic.   Neck:      Thyroid: No thyromegaly.      Vascular: No carotid bruit.      Trachea: Trachea normal.   Cardiovascular:      Rate and Rhythm: Normal rate and regular rhythm.      Heart sounds: No murmur heard.  No friction rub. No gallop.    Pulmonary:      Effort: Pulmonary effort is normal. No respiratory distress.      Breath sounds: Normal breath sounds. No wheezing.   Chest:      Chest wall: No tenderness.   Musculoskeletal:      Cervical back: Neck supple.   Skin:     General: Skin is dry.      Findings: No rash.      Nails: There is no clubbing.   Neurological:      Mental Status: He is alert and oriented to person, place, and time.   Psychiatric:         Behavior: Behavior is cooperative.         Assessment/Plan   Problem List Items Addressed This Visit        Medium    Anemia    Current Assessment & Plan     Patient given order for fasting labs.         Relevant Orders    CBC & Differential (Completed)    Hyperlipidemia    Current " Assessment & Plan     Patient given order for fasting labs.         Relevant Orders    Lipid Panel With / Chol / HDL Ratio (Completed)    Comprehensive metabolic panel (Completed)    Hypertension    Current Assessment & Plan     Doing well  Encouraged to watch salt, exercise more and lose weight.           Hypothyroidism    Current Assessment & Plan     Patient given order for fasting labs.         Relevant Orders    TSH (Completed)    Malignant neoplasm of colon (HCC) - Primary    Current Assessment & Plan     Doing well.  Patient released from GI.            Low    Gastroesophageal reflux disease    Current Assessment & Plan     Doing well with Prilosec.  Benefit the drug outweighs the risk         Hypomagnesemia    Current Assessment & Plan     Patient given order for fasting labs.         Relevant Orders    Magnesium (Completed)       Unprioritized    Need for influenza vaccination    Relevant Orders    Flucelvax Quad=>4Years (PFS) (Completed)    Need for pneumococcal vaccine    Relevant Orders    Pneumococcal Polysaccharide Vaccine 23-Valent Greater Than or Equal To 3yo Subcutaneous / IM (Completed)    Screening PSA (prostate specific antigen)    Current Assessment & Plan     Patient given order for fasting labs.         Relevant Orders    PSA Screen (Completed)

## 2021-11-10 LAB
ALBUMIN SERPL-MCNC: 4.8 G/DL (ref 3.8–4.8)
ALBUMIN/GLOB SERPL: 1.7 {RATIO} (ref 1.2–2.2)
ALP SERPL-CCNC: 144 IU/L (ref 44–121)
ALT SERPL-CCNC: 39 IU/L (ref 0–44)
AST SERPL-CCNC: 27 IU/L (ref 0–40)
BASOPHILS # BLD AUTO: 0 X10E3/UL (ref 0–0.2)
BASOPHILS NFR BLD AUTO: 0 %
BILIRUB SERPL-MCNC: 1 MG/DL (ref 0–1.2)
BUN SERPL-MCNC: 15 MG/DL (ref 8–27)
BUN/CREAT SERPL: 11 (ref 10–24)
CALCIUM SERPL-MCNC: 9.5 MG/DL (ref 8.6–10.2)
CHLORIDE SERPL-SCNC: 104 MMOL/L (ref 96–106)
CHOLEST SERPL-MCNC: 131 MG/DL (ref 100–199)
CHOLEST/HDLC SERPL: 2.7 RATIO (ref 0–5)
CO2 SERPL-SCNC: 20 MMOL/L (ref 20–29)
CREAT SERPL-MCNC: 1.35 MG/DL (ref 0.76–1.27)
EOSINOPHIL # BLD AUTO: 0.1 X10E3/UL (ref 0–0.4)
EOSINOPHIL NFR BLD AUTO: 1 %
ERYTHROCYTE [DISTWIDTH] IN BLOOD BY AUTOMATED COUNT: 13.8 % (ref 11.6–15.4)
GLOBULIN SER CALC-MCNC: 2.8 G/DL (ref 1.5–4.5)
GLUCOSE SERPL-MCNC: 90 MG/DL (ref 65–99)
HCT VFR BLD AUTO: 46.2 % (ref 37.5–51)
HDLC SERPL-MCNC: 48 MG/DL
HGB BLD-MCNC: 15.4 G/DL (ref 13–17.7)
IMM GRANULOCYTES # BLD AUTO: 0 X10E3/UL (ref 0–0.1)
IMM GRANULOCYTES NFR BLD AUTO: 1 %
LDLC SERPL CALC-MCNC: 39 MG/DL (ref 0–99)
LYMPHOCYTES # BLD AUTO: 1.9 X10E3/UL (ref 0.7–3.1)
LYMPHOCYTES NFR BLD AUTO: 28 %
MAGNESIUM SERPL-MCNC: 1.3 MG/DL (ref 1.6–2.3)
MCH RBC QN AUTO: 29.4 PG (ref 26.6–33)
MCHC RBC AUTO-ENTMCNC: 33.3 G/DL (ref 31.5–35.7)
MCV RBC AUTO: 88 FL (ref 79–97)
MONOCYTES # BLD AUTO: 0.4 X10E3/UL (ref 0.1–0.9)
MONOCYTES NFR BLD AUTO: 7 %
NEUTROPHILS # BLD AUTO: 4.3 X10E3/UL (ref 1.4–7)
NEUTROPHILS NFR BLD AUTO: 63 %
PLATELET # BLD AUTO: 219 X10E3/UL (ref 150–450)
POTASSIUM SERPL-SCNC: 3.7 MMOL/L (ref 3.5–5.2)
PROT SERPL-MCNC: 7.6 G/DL (ref 6–8.5)
PSA SERPL-MCNC: 0.3 NG/ML (ref 0–4)
RBC # BLD AUTO: 5.23 X10E6/UL (ref 4.14–5.8)
SODIUM SERPL-SCNC: 140 MMOL/L (ref 134–144)
TRIGL SERPL-MCNC: 293 MG/DL (ref 0–149)
TSH SERPL DL<=0.005 MIU/L-ACNC: 0.96 UIU/ML (ref 0.45–4.5)
VLDLC SERPL CALC-MCNC: 44 MG/DL (ref 5–40)
WBC # BLD AUTO: 6.7 X10E3/UL (ref 3.4–10.8)

## 2021-11-16 ENCOUNTER — OFFICE VISIT (OUTPATIENT)
Dept: FAMILY MEDICINE CLINIC | Facility: CLINIC | Age: 66
End: 2021-11-16

## 2021-11-16 VITALS
TEMPERATURE: 97.7 F | DIASTOLIC BLOOD PRESSURE: 77 MMHG | SYSTOLIC BLOOD PRESSURE: 117 MMHG | HEIGHT: 68 IN | WEIGHT: 156 LBS | OXYGEN SATURATION: 99 % | RESPIRATION RATE: 18 BRPM | BODY MASS INDEX: 23.64 KG/M2 | HEART RATE: 71 BPM

## 2021-11-16 DIAGNOSIS — Z83.3 FAMILY HISTORY OF DIABETES MELLITUS: ICD-10-CM

## 2021-11-16 DIAGNOSIS — Z82.3 FAMILY HISTORY OF STROKE: ICD-10-CM

## 2021-11-16 DIAGNOSIS — K21.9 GASTROESOPHAGEAL REFLUX DISEASE WITHOUT ESOPHAGITIS: ICD-10-CM

## 2021-11-16 DIAGNOSIS — R79.89 ELEVATED LIVER FUNCTION TESTS: ICD-10-CM

## 2021-11-16 DIAGNOSIS — N52.1 ERECTILE DYSFUNCTION DUE TO DISEASES CLASSIFIED ELSEWHERE: ICD-10-CM

## 2021-11-16 DIAGNOSIS — Z12.5 SCREENING PSA (PROSTATE SPECIFIC ANTIGEN): ICD-10-CM

## 2021-11-16 DIAGNOSIS — K21.9 GASTROESOPHAGEAL REFLUX DISEASE: ICD-10-CM

## 2021-11-16 DIAGNOSIS — Z23 NEED FOR INFLUENZA VACCINATION: ICD-10-CM

## 2021-11-16 DIAGNOSIS — I45.10 RIGHT BUNDLE BRANCH BLOCK: ICD-10-CM

## 2021-11-16 DIAGNOSIS — Z23 NEED FOR PNEUMOCOCCAL VACCINE: ICD-10-CM

## 2021-11-16 DIAGNOSIS — Z85.818 HISTORY OF CANCER TONSIL: ICD-10-CM

## 2021-11-16 DIAGNOSIS — H90.2 CONDUCTIVE HEARING LOSS, UNSPECIFIED LATERALITY: ICD-10-CM

## 2021-11-16 DIAGNOSIS — K40.90 UNILATERAL INGUINAL HERNIA WITHOUT OBSTRUCTION OR GANGRENE, RECURRENCE NOT SPECIFIED: ICD-10-CM

## 2021-11-16 DIAGNOSIS — D72.829 LEUKOCYTOSIS, UNSPECIFIED TYPE: ICD-10-CM

## 2021-11-16 DIAGNOSIS — N18.31 STAGE 3A CHRONIC KIDNEY DISEASE (HCC): ICD-10-CM

## 2021-11-16 DIAGNOSIS — N40.0 BENIGN PROSTATIC HYPERPLASIA, UNSPECIFIED WHETHER LOWER URINARY TRACT SYMPTOMS PRESENT: ICD-10-CM

## 2021-11-16 DIAGNOSIS — Z00.01 ENCOUNTER FOR GENERAL ADULT MEDICAL EXAMINATION WITH ABNORMAL FINDINGS: ICD-10-CM

## 2021-11-16 DIAGNOSIS — E78.2 MIXED HYPERLIPIDEMIA: ICD-10-CM

## 2021-11-16 DIAGNOSIS — R74.8 ELEVATED ALKALINE PHOSPHATASE LEVEL: ICD-10-CM

## 2021-11-16 DIAGNOSIS — C18.9 MALIGNANT NEOPLASM OF COLON, UNSPECIFIED PART OF COLON (HCC): ICD-10-CM

## 2021-11-16 DIAGNOSIS — E03.8 OTHER SPECIFIED HYPOTHYROIDISM: ICD-10-CM

## 2021-11-16 DIAGNOSIS — E83.42 HYPOMAGNESEMIA: Primary | ICD-10-CM

## 2021-11-16 DIAGNOSIS — E87.6 HYPOKALEMIA: ICD-10-CM

## 2021-11-16 DIAGNOSIS — D50.8 OTHER IRON DEFICIENCY ANEMIA: ICD-10-CM

## 2021-11-16 DIAGNOSIS — I10 PRIMARY HYPERTENSION: ICD-10-CM

## 2021-11-16 DIAGNOSIS — R31.9 HEMATURIA, UNSPECIFIED TYPE: ICD-10-CM

## 2021-11-16 PROCEDURE — 99214 OFFICE O/P EST MOD 30 MIN: CPT | Performed by: FAMILY MEDICINE

## 2021-11-16 PROCEDURE — 99396 PREV VISIT EST AGE 40-64: CPT | Performed by: FAMILY MEDICINE

## 2021-11-16 RX ORDER — OMEPRAZOLE 20 MG/1
20 CAPSULE, DELAYED RELEASE ORAL DAILY
Qty: 90 CAPSULE | Refills: 0
Start: 2021-11-16 | End: 2022-02-01

## 2021-11-16 RX ORDER — ATORVASTATIN CALCIUM 10 MG/1
10 TABLET, FILM COATED ORAL DAILY
Qty: 90 TABLET | Refills: 3
Start: 2021-11-16 | End: 2022-05-23 | Stop reason: SDUPTHER

## 2021-11-16 RX ORDER — LEVOTHYROXINE SODIUM 88 UG/1
88 TABLET ORAL DAILY
Qty: 90 TABLET | Refills: 0
Start: 2021-11-16 | End: 2022-02-07

## 2021-11-16 RX ORDER — MAGNESIUM CHLORIDE 64 MG
TABLET, DELAYED RELEASE (ENTERIC COATED) ORAL
Qty: 120 TABLET | Refills: 12
Start: 2021-11-16 | End: 2022-05-23 | Stop reason: SDUPTHER

## 2021-11-16 RX ORDER — POTASSIUM CHLORIDE 20 MEQ/1
20 TABLET, EXTENDED RELEASE ORAL 4 TIMES DAILY
Qty: 360 TABLET | Refills: 3
Start: 2021-11-16 | End: 2022-05-23 | Stop reason: SDUPTHER

## 2022-01-31 DIAGNOSIS — K21.9 GASTROESOPHAGEAL REFLUX DISEASE: ICD-10-CM

## 2022-02-01 RX ORDER — OMEPRAZOLE 20 MG/1
CAPSULE, DELAYED RELEASE ORAL
Qty: 90 CAPSULE | Refills: 3 | Status: SHIPPED | OUTPATIENT
Start: 2022-02-01 | End: 2022-05-23 | Stop reason: SDUPTHER

## 2022-02-04 DIAGNOSIS — E03.8 OTHER SPECIFIED HYPOTHYROIDISM: ICD-10-CM

## 2022-02-07 RX ORDER — LEVOTHYROXINE SODIUM 88 UG/1
TABLET ORAL
Qty: 90 TABLET | Refills: 0 | Status: SHIPPED | OUTPATIENT
Start: 2022-02-07 | End: 2022-05-09

## 2022-02-09 LAB
ALBUMIN SERPL-MCNC: 4.8 G/DL (ref 3.8–4.8)
ALBUMIN/GLOB SERPL: 1.9 {RATIO} (ref 1.2–2.2)
ALP SERPL-CCNC: 144 IU/L (ref 44–121)
ALT SERPL-CCNC: 43 IU/L (ref 0–44)
AST SERPL-CCNC: 24 IU/L (ref 0–40)
BILIRUB SERPL-MCNC: 0.5 MG/DL (ref 0–1.2)
BUN SERPL-MCNC: 16 MG/DL (ref 8–27)
BUN/CREAT SERPL: 13 (ref 10–24)
CALCIUM SERPL-MCNC: 9.5 MG/DL (ref 8.6–10.2)
CHLORIDE SERPL-SCNC: 103 MMOL/L (ref 96–106)
CHOLEST SERPL-MCNC: 158 MG/DL (ref 100–199)
CHOLEST/HDLC SERPL: 3.4 RATIO (ref 0–5)
CO2 SERPL-SCNC: 22 MMOL/L (ref 20–29)
CREAT SERPL-MCNC: 1.25 MG/DL (ref 0.76–1.27)
GLOBULIN SER CALC-MCNC: 2.5 G/DL (ref 1.5–4.5)
GLUCOSE SERPL-MCNC: 95 MG/DL (ref 65–99)
HDLC SERPL-MCNC: 46 MG/DL
LDLC SERPL CALC-MCNC: 53 MG/DL (ref 0–99)
MAGNESIUM SERPL-MCNC: 1.5 MG/DL (ref 1.6–2.3)
POTASSIUM SERPL-SCNC: 4.3 MMOL/L (ref 3.5–5.2)
PROT SERPL-MCNC: 7.3 G/DL (ref 6–8.5)
SODIUM SERPL-SCNC: 140 MMOL/L (ref 134–144)
TRIGL SERPL-MCNC: 391 MG/DL (ref 0–149)
TSH SERPL DL<=0.005 MIU/L-ACNC: 4.65 UIU/ML (ref 0.45–4.5)
VLDLC SERPL CALC-MCNC: 59 MG/DL (ref 5–40)

## 2022-02-14 NOTE — ASSESSMENT & PLAN NOTE
Excellent control without medications:  Encouraged to watch salt, exercise more and lose weight.

## 2022-02-14 NOTE — ASSESSMENT & PLAN NOTE
Lipid and CMP reviewed with patient  Worsening: Chol 158 up from 131, trig 391 up from 293, HDL 46 down from 48, LDL 53 up from 39  Encouraged to watch fatty intake, exercise more, and lose weight.   compliant with medication Patient tolerated Lipitor well without side effects. I feel the benefits of the medication outweigh the risks.    Is not getting adequate diet and exercise  Goals developed at last visit were not met   Follow up in 4  months  Care management needs are self-addressed. Self-management abilities addressed and patient is capable of managing his own disease.

## 2022-02-14 NOTE — ASSESSMENT & PLAN NOTE
Slightly worse:  TSH 4.650 up from 0.961  Patient tolerated Synthroid well without side effects. I feel the benefits of the medication outweigh the risks.  Advised pt I will not increase medication at this time.  Will repeat with next labs.

## 2022-02-15 ENCOUNTER — OFFICE VISIT (OUTPATIENT)
Dept: FAMILY MEDICINE CLINIC | Facility: CLINIC | Age: 67
End: 2022-02-15

## 2022-02-15 VITALS
HEART RATE: 81 BPM | BODY MASS INDEX: 23.58 KG/M2 | SYSTOLIC BLOOD PRESSURE: 118 MMHG | OXYGEN SATURATION: 100 % | WEIGHT: 155.6 LBS | HEIGHT: 68 IN | RESPIRATION RATE: 16 BRPM | DIASTOLIC BLOOD PRESSURE: 70 MMHG | TEMPERATURE: 97.7 F

## 2022-02-15 DIAGNOSIS — E03.8 OTHER SPECIFIED HYPOTHYROIDISM: ICD-10-CM

## 2022-02-15 DIAGNOSIS — R74.8 ELEVATED ALKALINE PHOSPHATASE LEVEL: ICD-10-CM

## 2022-02-15 DIAGNOSIS — E78.2 MIXED HYPERLIPIDEMIA: Primary | ICD-10-CM

## 2022-02-15 DIAGNOSIS — N18.31 STAGE 3A CHRONIC KIDNEY DISEASE: ICD-10-CM

## 2022-02-15 DIAGNOSIS — I10 PRIMARY HYPERTENSION: ICD-10-CM

## 2022-02-15 DIAGNOSIS — E83.42 HYPOMAGNESEMIA: ICD-10-CM

## 2022-02-15 PROCEDURE — 99214 OFFICE O/P EST MOD 30 MIN: CPT | Performed by: FAMILY MEDICINE

## 2022-02-15 NOTE — ASSESSMENT & PLAN NOTE
Improved, but still low.  Magnesium 1.5 up from 1.3.  Advised to increase Magnesium Chloride to 5 pills daily.  Will repeat with next labs.

## 2022-02-15 NOTE — PROGRESS NOTES
Subjective   Lavelle Mitchell is a 66 y.o. male.   Chief Complaint   Patient presents with   • Hypertension   • Hyperlipidemia   • Hypothyroidism     Hypertension  This is a chronic problem. The current episode started more than 1 year ago. The problem is unchanged. The problem is controlled. Pertinent negatives include no chest pain, palpitations or shortness of breath. There are no associated agents to hypertension. Risk factors for coronary artery disease include male gender. Treatments tried: none.   Hyperlipidemia  This is a chronic problem. The current episode started more than 1 year ago. The problem is controlled. Recent lipid tests were reviewed and are variable. Exacerbating diseases include hypothyroidism. There are no known factors aggravating his hyperlipidemia. Pertinent negatives include no chest pain, myalgias or shortness of breath. Current antihyperlipidemic treatment includes statins. The current treatment provides moderate improvement of lipids. There are no compliance problems.  Risk factors for coronary artery disease include hypertension and male sex.   Hypothyroidism  This is a chronic problem. The current episode started more than 1 year ago. The problem occurs constantly. The problem has been gradually improving. Pertinent negatives include no chest pain, fatigue, myalgias or nausea. Nothing aggravates the symptoms. Treatments tried: Levothyroxine 88 mcg. The treatment provided moderate relief.        The following portions of the patient's history were reviewed and updated as appropriate: allergies, current medications, past family history, past medical history, past social history, past surgical history and problem list.    Past Medical History:   Diagnosis Date   • Hyperlipidemia    • Hypertension    • Hypothyroidism        Past Surgical History:   Procedure Laterality Date   • CHOLECYSTECTOMY     • COLON SURGERY     • COLONOSCOPY     • LYMPH NODE BIOPSY          Family History   Problem  "Relation Age of Onset   • Heart disease Mother    • Hyperlipidemia Mother    • Diabetes Mother    • Cancer Mother         stomach   • Vision loss Mother         mother hole in her eye   • Heart disease Father    • Stroke Father    • Hyperlipidemia Father    • Other Father         colon polyps   • Hypertension Sister    • Diabetes Sister    • Birth defects Maternal Grandmother    • Birth defects Paternal Grandmother         Social History     Socioeconomic History   • Marital status:      Spouse name: Juan M   • Number of children: 2   Tobacco Use   • Smoking status: Former Smoker     Types: Pipe     Quit date: 2008     Years since quittin.6   • Smokeless tobacco: Never Used   Substance and Sexual Activity   • Alcohol use: Yes     Comment: 1-2 drinks monthly   • Drug use: No   • Sexual activity: Defer         Review of Systems   Constitutional: Negative for appetite change, fatigue, unexpected weight gain and unexpected weight loss.   Respiratory: Negative for shortness of breath.    Cardiovascular: Negative for chest pain, palpitations and leg swelling.   Gastrointestinal: Negative for nausea.   Endocrine: Negative for cold intolerance and heat intolerance.   Musculoskeletal: Negative for myalgias.   Skin: Negative for dry skin.   Neurological: Negative for headache.       Objective   Visit Vitals  /70 (BP Location: Right arm, Patient Position: Sitting, Cuff Size: Large Adult)   Pulse 81   Temp 97.7 °F (36.5 °C) (Skin)   Resp 16   Ht 172.7 cm (68\")   Wt 70.6 kg (155 lb 9.6 oz)   SpO2 100%   BMI 23.66 kg/m²     Physical Exam  Vitals and nursing note reviewed.   Constitutional:       Appearance: He is well-developed.   HENT:      Head: Normocephalic.   Neck:      Thyroid: No thyromegaly.      Vascular: No carotid bruit.      Trachea: Trachea normal.   Cardiovascular:      Rate and Rhythm: Normal rate and regular rhythm.      Heart sounds: No murmur heard.  No friction rub. No gallop.  "   Pulmonary:      Effort: Pulmonary effort is normal. No respiratory distress.      Breath sounds: Normal breath sounds. No wheezing.   Chest:      Chest wall: No tenderness.   Musculoskeletal:      Cervical back: Neck supple.   Skin:     General: Skin is dry.      Findings: No rash.      Nails: There is no clubbing.   Neurological:      Mental Status: He is alert and oriented to person, place, and time.   Psychiatric:         Behavior: Behavior is cooperative.         Assessment/Plan   Problem List Items Addressed This Visit        Medium    Hyperlipidemia - Primary    Current Assessment & Plan     Lipid and CMP reviewed with patient  Worsening: Chol 158 up from 131, trig 391 up from 293, HDL 46 down from 48, LDL 53 up from 39  Encouraged to watch fatty intake, exercise more, and lose weight.   compliant with medication Patient tolerated Lipitor well without side effects. I feel the benefits of the medication outweigh the risks.    Is not getting adequate diet and exercise  Goals developed at last visit were not met   Follow up in 4  months  Care management needs are self-addressed. Self-management abilities addressed and patient is capable of managing his own disease.           Relevant Orders    Comprehensive metabolic panel    Lipid Panel With / Chol / HDL Ratio    Hypertension    Current Assessment & Plan     Excellent control without medications:  Encouraged to watch salt, exercise more and lose weight.             Hypothyroidism    Current Assessment & Plan     Slightly worse:  TSH 4.650 up from 0.961  Patient tolerated Synthroid well without side effects. I feel the benefits of the medication outweigh the risks.  Advised pt I will not increase medication at this time.  Will repeat with next labs.           Relevant Orders    TSH    Stage 3a chronic kidney disease    Current Assessment & Plan     Resolved.  Creatinine 1.25 down from 1.35              Low    Elevated alkaline phosphatase level    Current  Assessment & Plan     Stable- Alk Phos 144 same as previous. Pt is asymptomatic.         Hypomagnesemia    Current Assessment & Plan     Improved, but still low.  Magnesium 1.5 up from 1.3.  Advised to increase Magnesium Chloride to 5 pills daily.  Will repeat with next labs.         Relevant Orders    Magnesium

## 2022-05-09 DIAGNOSIS — E03.8 OTHER SPECIFIED HYPOTHYROIDISM: ICD-10-CM

## 2022-05-09 RX ORDER — LEVOTHYROXINE SODIUM 88 UG/1
TABLET ORAL
Qty: 90 TABLET | Refills: 0 | Status: SHIPPED | OUTPATIENT
Start: 2022-05-09 | End: 2022-05-23 | Stop reason: SDUPTHER

## 2022-05-23 DIAGNOSIS — E03.8 OTHER SPECIFIED HYPOTHYROIDISM: ICD-10-CM

## 2022-05-23 DIAGNOSIS — K21.9 GASTROESOPHAGEAL REFLUX DISEASE: ICD-10-CM

## 2022-05-23 DIAGNOSIS — E87.6 HYPOKALEMIA: ICD-10-CM

## 2022-05-23 DIAGNOSIS — E78.2 MIXED HYPERLIPIDEMIA: ICD-10-CM

## 2022-05-23 DIAGNOSIS — E83.42 HYPOMAGNESEMIA: ICD-10-CM

## 2022-05-23 NOTE — TELEPHONE ENCOUNTER
Patient came in with a change in insurance. States he needs refills on lipitor,levothyroxine,magdelay,omeprazole,potassium chloride. State he gets them through [facility]. He is now on [employer] Replacement.

## 2022-05-24 DIAGNOSIS — E78.2 MIXED HYPERLIPIDEMIA: ICD-10-CM

## 2022-05-24 DIAGNOSIS — K21.9 GASTROESOPHAGEAL REFLUX DISEASE: ICD-10-CM

## 2022-05-24 DIAGNOSIS — E03.8 OTHER SPECIFIED HYPOTHYROIDISM: ICD-10-CM

## 2022-05-24 RX ORDER — POTASSIUM CHLORIDE 20 MEQ/1
20 TABLET, EXTENDED RELEASE ORAL 4 TIMES DAILY
Qty: 360 TABLET | Refills: 0 | Status: SHIPPED | OUTPATIENT
Start: 2022-05-24 | End: 2022-11-28 | Stop reason: SDUPTHER

## 2022-05-24 RX ORDER — OMEPRAZOLE 20 MG/1
20 CAPSULE, DELAYED RELEASE ORAL DAILY
Qty: 90 CAPSULE | Refills: 0 | Status: SHIPPED | OUTPATIENT
Start: 2022-05-24 | End: 2022-05-24 | Stop reason: SDUPTHER

## 2022-05-24 RX ORDER — LEVOTHYROXINE SODIUM 88 UG/1
88 TABLET ORAL DAILY
Qty: 90 TABLET | Refills: 0 | Status: SHIPPED | OUTPATIENT
Start: 2022-05-24 | End: 2022-05-24 | Stop reason: SDUPTHER

## 2022-05-24 RX ORDER — ATORVASTATIN CALCIUM 10 MG/1
10 TABLET, FILM COATED ORAL DAILY
Qty: 90 TABLET | Refills: 0 | Status: SHIPPED | OUTPATIENT
Start: 2022-05-24 | End: 2022-05-24 | Stop reason: SDUPTHER

## 2022-05-24 RX ORDER — LANOLIN ALCOHOL/MO/W.PET/CERES
CREAM (GRAM) TOPICAL
Qty: 360 TABLET | Refills: 0 | Status: SHIPPED | OUTPATIENT
Start: 2022-05-24 | End: 2022-11-28 | Stop reason: SDUPTHER

## 2022-05-26 DIAGNOSIS — E03.8 OTHER SPECIFIED HYPOTHYROIDISM: ICD-10-CM

## 2022-05-26 DIAGNOSIS — K21.9 GASTROESOPHAGEAL REFLUX DISEASE: ICD-10-CM

## 2022-05-26 DIAGNOSIS — E78.2 MIXED HYPERLIPIDEMIA: ICD-10-CM

## 2022-05-26 DIAGNOSIS — E83.42 HYPOMAGNESEMIA: ICD-10-CM

## 2022-05-26 RX ORDER — LEVOTHYROXINE SODIUM 88 UG/1
88 TABLET ORAL DAILY
Qty: 90 TABLET | Refills: 0 | Status: SHIPPED | OUTPATIENT
Start: 2022-05-26 | End: 2022-05-26 | Stop reason: SDUPTHER

## 2022-05-26 RX ORDER — OMEPRAZOLE 20 MG/1
20 CAPSULE, DELAYED RELEASE ORAL DAILY
Qty: 90 CAPSULE | Refills: 0 | Status: SHIPPED | OUTPATIENT
Start: 2022-05-26 | End: 2022-05-26 | Stop reason: SDUPTHER

## 2022-05-26 RX ORDER — ATORVASTATIN CALCIUM 10 MG/1
10 TABLET, FILM COATED ORAL DAILY
Qty: 90 TABLET | Refills: 0 | Status: SHIPPED | OUTPATIENT
Start: 2022-05-26 | End: 2022-05-26 | Stop reason: SDUPTHER

## 2022-05-27 RX ORDER — ATORVASTATIN CALCIUM 10 MG/1
10 TABLET, FILM COATED ORAL DAILY
Qty: 30 TABLET | Refills: 0 | Status: SHIPPED | OUTPATIENT
Start: 2022-05-27 | End: 2022-06-01 | Stop reason: SDUPTHER

## 2022-05-27 RX ORDER — OMEPRAZOLE 20 MG/1
20 CAPSULE, DELAYED RELEASE ORAL DAILY
Qty: 30 CAPSULE | Refills: 0 | Status: SHIPPED | OUTPATIENT
Start: 2022-05-27 | End: 2022-06-01 | Stop reason: SDUPTHER

## 2022-05-27 RX ORDER — LEVOTHYROXINE SODIUM 88 UG/1
88 TABLET ORAL DAILY
Qty: 30 TABLET | Refills: 0 | Status: SHIPPED | OUTPATIENT
Start: 2022-05-27 | End: 2022-06-01 | Stop reason: SDUPTHER

## 2022-06-01 DIAGNOSIS — K21.9 GASTROESOPHAGEAL REFLUX DISEASE: ICD-10-CM

## 2022-06-01 DIAGNOSIS — E03.8 OTHER SPECIFIED HYPOTHYROIDISM: ICD-10-CM

## 2022-06-01 DIAGNOSIS — E78.2 MIXED HYPERLIPIDEMIA: ICD-10-CM

## 2022-06-01 RX ORDER — ATORVASTATIN CALCIUM 10 MG/1
10 TABLET, FILM COATED ORAL DAILY
Qty: 30 TABLET | Refills: 0 | Status: SHIPPED | OUTPATIENT
Start: 2022-06-01 | End: 2022-08-18

## 2022-06-01 RX ORDER — LEVOTHYROXINE SODIUM 88 UG/1
88 TABLET ORAL DAILY
Qty: 30 TABLET | Refills: 0 | Status: SHIPPED | OUTPATIENT
Start: 2022-06-01 | End: 2022-08-22

## 2022-06-01 RX ORDER — OMEPRAZOLE 20 MG/1
20 CAPSULE, DELAYED RELEASE ORAL DAILY
Qty: 30 CAPSULE | Refills: 0 | Status: SHIPPED | OUTPATIENT
Start: 2022-06-01 | End: 2022-08-18

## 2022-06-09 LAB
ALBUMIN SERPL-MCNC: 4.7 G/DL (ref 3.8–4.8)
ALBUMIN/GLOB SERPL: 1.7 {RATIO} (ref 1.2–2.2)
ALP SERPL-CCNC: 124 IU/L (ref 44–121)
ALT SERPL-CCNC: 34 IU/L (ref 0–44)
AST SERPL-CCNC: 22 IU/L (ref 0–40)
BILIRUB SERPL-MCNC: 0.7 MG/DL (ref 0–1.2)
BUN SERPL-MCNC: 14 MG/DL (ref 8–27)
BUN/CREAT SERPL: 12 (ref 10–24)
CALCIUM SERPL-MCNC: 9.3 MG/DL (ref 8.6–10.2)
CHLORIDE SERPL-SCNC: 103 MMOL/L (ref 96–106)
CHOLEST SERPL-MCNC: 148 MG/DL (ref 100–199)
CHOLEST/HDLC SERPL: 3.4 RATIO (ref 0–5)
CO2 SERPL-SCNC: 23 MMOL/L (ref 20–29)
CREAT SERPL-MCNC: 1.16 MG/DL (ref 0.76–1.27)
EGFRCR SERPLBLD CKD-EPI 2021: 70 ML/MIN/1.73
GLOBULIN SER CALC-MCNC: 2.7 G/DL (ref 1.5–4.5)
GLUCOSE SERPL-MCNC: 92 MG/DL (ref 65–99)
HDLC SERPL-MCNC: 44 MG/DL
LDLC SERPL CALC-MCNC: 44 MG/DL (ref 0–99)
MAGNESIUM SERPL-MCNC: 1.7 MG/DL (ref 1.6–2.3)
POTASSIUM SERPL-SCNC: 3.8 MMOL/L (ref 3.5–5.2)
PROT SERPL-MCNC: 7.4 G/DL (ref 6–8.5)
SODIUM SERPL-SCNC: 141 MMOL/L (ref 134–144)
TRIGL SERPL-MCNC: 413 MG/DL (ref 0–149)
TSH SERPL DL<=0.005 MIU/L-ACNC: 2.34 UIU/ML (ref 0.45–4.5)
VLDLC SERPL CALC-MCNC: 60 MG/DL (ref 5–40)

## 2022-06-15 ENCOUNTER — OFFICE VISIT (OUTPATIENT)
Dept: FAMILY MEDICINE CLINIC | Facility: CLINIC | Age: 67
End: 2022-06-15

## 2022-06-15 VITALS
HEART RATE: 73 BPM | DIASTOLIC BLOOD PRESSURE: 71 MMHG | HEIGHT: 68 IN | SYSTOLIC BLOOD PRESSURE: 130 MMHG | TEMPERATURE: 97.9 F | BODY MASS INDEX: 23.61 KG/M2 | OXYGEN SATURATION: 98 % | RESPIRATION RATE: 15 BRPM | WEIGHT: 155.8 LBS

## 2022-06-15 DIAGNOSIS — E78.2 MIXED HYPERLIPIDEMIA: Primary | ICD-10-CM

## 2022-06-15 DIAGNOSIS — I10 PRIMARY HYPERTENSION: ICD-10-CM

## 2022-06-15 DIAGNOSIS — E03.8 OTHER SPECIFIED HYPOTHYROIDISM: ICD-10-CM

## 2022-06-15 DIAGNOSIS — E87.6 HYPOKALEMIA: ICD-10-CM

## 2022-06-15 DIAGNOSIS — R74.8 ELEVATED ALKALINE PHOSPHATASE LEVEL: ICD-10-CM

## 2022-06-15 DIAGNOSIS — E83.42 HYPOMAGNESEMIA: ICD-10-CM

## 2022-06-15 DIAGNOSIS — R79.89 ELEVATED LIVER FUNCTION TESTS: ICD-10-CM

## 2022-06-15 DIAGNOSIS — N18.31 STAGE 3A CHRONIC KIDNEY DISEASE: ICD-10-CM

## 2022-06-15 PROCEDURE — 99214 OFFICE O/P EST MOD 30 MIN: CPT | Performed by: FAMILY MEDICINE

## 2022-06-15 NOTE — ASSESSMENT & PLAN NOTE
Stable . Chol 148 down from 158, trig 413 up from 391, HDL 44 down from 46, LDL 44 down from 53  Encouraged to watch fatty intake, exercise more, and lose weight.   compliant with medication  Patient tolerated lipitor well without side effects. I feel the benefits of the medication outweigh the risks.    Is not getting adequate diet and exercise  Goals developed at last visit were met   Follow up in 5  months  Care management needs are self-addressed. Self-management abilities addressed and patient is capable of managing his own disease.

## 2022-06-15 NOTE — ASSESSMENT & PLAN NOTE
Doing well; TSH 2.340 down from 4.65  Patient tolerated synthroid well without side effects. I feel the benefits of the medication outweigh the risks.

## 2022-07-22 NOTE — ASSESSMENT & PLAN NOTE
Head, normocephalic, atraumatic, Face, Face within normal limits Stable- Alk Phos 144 same as previous. Pt is asymptomatic.

## 2022-08-18 DIAGNOSIS — K21.9 GASTROESOPHAGEAL REFLUX DISEASE: ICD-10-CM

## 2022-08-18 DIAGNOSIS — E78.2 MIXED HYPERLIPIDEMIA: ICD-10-CM

## 2022-08-19 RX ORDER — ATORVASTATIN CALCIUM 10 MG/1
TABLET, FILM COATED ORAL
Qty: 90 TABLET | Refills: 0 | Status: SHIPPED | OUTPATIENT
Start: 2022-08-19 | End: 2022-11-28 | Stop reason: SDUPTHER

## 2022-08-19 RX ORDER — OMEPRAZOLE 20 MG/1
CAPSULE, DELAYED RELEASE ORAL
Qty: 90 CAPSULE | Refills: 0 | Status: SHIPPED | OUTPATIENT
Start: 2022-08-19 | End: 2022-11-28 | Stop reason: SDUPTHER

## 2022-08-22 DIAGNOSIS — E03.8 OTHER SPECIFIED HYPOTHYROIDISM: ICD-10-CM

## 2022-08-22 RX ORDER — LEVOTHYROXINE SODIUM 88 UG/1
TABLET ORAL
Qty: 90 TABLET | Refills: 0 | Status: SHIPPED | OUTPATIENT
Start: 2022-08-22 | End: 2022-12-21 | Stop reason: SDUPTHER

## 2022-09-11 NOTE — ASSESSMENT & PLAN NOTE
Improved. But not at goal;   Chol 158 was 144, Trig 380 was 404, LDL 34. HDL 48 was 44.  Encouraged to watch fatty intake, exercise more, and lose weight.   compliant with medication   Is not getting adequate diet and exercise  Goals developed at last visit were not met   Follow up in 6  months  Care management needs are self-addressed.  Self-management abilities addressed and patient is capable of managing his own disease.     General

## 2022-11-17 ENCOUNTER — OFFICE VISIT (OUTPATIENT)
Dept: FAMILY MEDICINE CLINIC | Facility: CLINIC | Age: 67
End: 2022-11-17

## 2022-11-17 VITALS
WEIGHT: 160.6 LBS | OXYGEN SATURATION: 98 % | DIASTOLIC BLOOD PRESSURE: 76 MMHG | HEIGHT: 68 IN | BODY MASS INDEX: 24.34 KG/M2 | TEMPERATURE: 97.7 F | HEART RATE: 76 BPM | SYSTOLIC BLOOD PRESSURE: 132 MMHG | RESPIRATION RATE: 14 BRPM

## 2022-11-17 DIAGNOSIS — I10 PRIMARY HYPERTENSION: Primary | ICD-10-CM

## 2022-11-17 DIAGNOSIS — E83.42 HYPOMAGNESEMIA: ICD-10-CM

## 2022-11-17 DIAGNOSIS — K21.9 GASTROESOPHAGEAL REFLUX DISEASE WITHOUT ESOPHAGITIS: ICD-10-CM

## 2022-11-17 DIAGNOSIS — R31.9 HEMATURIA, UNSPECIFIED TYPE: ICD-10-CM

## 2022-11-17 DIAGNOSIS — Z12.5 SCREENING PSA (PROSTATE SPECIFIC ANTIGEN): ICD-10-CM

## 2022-11-17 DIAGNOSIS — E78.2 MIXED HYPERLIPIDEMIA: ICD-10-CM

## 2022-11-17 DIAGNOSIS — E03.8 OTHER SPECIFIED HYPOTHYROIDISM: ICD-10-CM

## 2022-11-17 PROCEDURE — 99213 OFFICE O/P EST LOW 20 MIN: CPT | Performed by: FAMILY MEDICINE

## 2022-11-17 NOTE — PROGRESS NOTES
Subjective   Lavelle Mitchell is a 67 y.o. male.     Hypertension  This is a chronic problem. The current episode started more than 1 year ago. The problem has been gradually improving since onset. The problem is controlled. Pertinent negatives include no chest pain, palpitations or shortness of breath.   Heartburn  He reports no chest pain. This is a recurrent problem. The current episode started more than 1 year ago. The problem occurs rarely. The symptoms are aggravated by certain foods. Pertinent negatives include no fatigue.        The following portions of the patient's history were reviewed and updated as appropriate: current medications, past family history, past medical history, past social history, past surgical history and problem list.    Family History   Problem Relation Age of Onset   • Heart disease Mother    • Hyperlipidemia Mother    • Diabetes Mother    • Cancer Mother         stomach   • Vision loss Mother         mother hole in her eye   • Heart disease Father    • Stroke Father    • Hyperlipidemia Father    • Other Father         colon polyps   • Hypertension Sister    • Diabetes Sister    • Birth defects Maternal Grandmother    • Birth defects Paternal Grandmother        Social History     Tobacco Use   • Smoking status: Former     Types: Pipe     Quit date: 2008     Years since quittin.3   • Smokeless tobacco: Never   Substance Use Topics   • Alcohol use: Yes     Comment: 1-2 drinks monthly   • Drug use: No       Past Surgical History:   Procedure Laterality Date   • CHOLECYSTECTOMY     • COLON SURGERY     • COLONOSCOPY     • LYMPH NODE BIOPSY         Patient Active Problem List   Diagnosis   • Hyperlipidemia   • Hypertension   • Hypothyroidism   • Elevated alkaline phosphatase level   • Elevated liver function tests   • Stage 3a chronic kidney disease   • Hypomagnesemia   • Malignant neoplasm of colon (HCC)   • Right bundle branch block   • Hearing loss   • Screening PSA (prostate  "specific antigen)   • History of cancer tonsil   • Unilateral inguinal hernia without obstruction or gangrene   • Hypokalemia   • Gastroesophageal reflux disease   • BPH (benign prostatic hyperplasia)   • Hematuria   • Encounter for general adult medical examination with abnormal findings   • Family history of stroke   • Family history of diabetes mellitus   • Erectile dysfunction due to diseases classified elsewhere       Current Outpatient Medications on File Prior to Visit   Medication Sig Dispense Refill   • atorvastatin (LIPITOR) 10 MG tablet TAKE 1 TABLET BY MOUTH EVERY DAY 90 tablet 0   • levothyroxine (SYNTHROID, LEVOTHROID) 88 MCG tablet TAKE 1 TABLET BY MOUTH EVERY DAY 90 tablet 0   • magnesium chloride ER (MagDelay) 64 MG DR tablet Take 4 pills daily 360 tablet 0   • omeprazole (priLOSEC) 20 MG capsule TAKE 1 CAPSULE BY MOUTH EVERY DAY 90 capsule 0   • potassium chloride (K-DUR,KLOR-CON) 20 MEQ CR tablet Take 1 tablet by mouth 4 (Four) Times a Day. 360 tablet 0     No current facility-administered medications on file prior to visit.       No Known Allergies    Review of Systems   Constitutional: Negative for fatigue.   HENT: Negative for hearing loss.    Respiratory: Negative for shortness of breath.    Cardiovascular: Negative for chest pain and palpitations.   Genitourinary: Negative for frequency.        Nocturia   Musculoskeletal: Negative for joint swelling.   Neurological: Negative for memory problem.       Objective   Visit Vitals  /76 (BP Location: Right arm, Patient Position: Sitting, Cuff Size: Adult)   Pulse 76   Temp 97.7 °F (36.5 °C)   Resp 14   Ht 172.7 cm (68\")   Wt 72.8 kg (160 lb 9.6 oz)   SpO2 98%   BMI 24.42 kg/m²     Physical Exam  Vitals and nursing note reviewed.   Constitutional:       Appearance: Normal appearance. He is well-developed.   HENT:      Head: Normocephalic.   Neck:      Thyroid: No thyromegaly.      Vascular: No carotid bruit.      Trachea: Trachea normal. "   Cardiovascular:      Rate and Rhythm: Normal rate and regular rhythm.      Heart sounds: No murmur heard.    No friction rub. No gallop.   Pulmonary:      Effort: Pulmonary effort is normal. No respiratory distress.      Breath sounds: Normal breath sounds. No wheezing.   Chest:      Chest wall: No tenderness.   Abdominal:      Palpations: Abdomen is soft.      Tenderness: There is no abdominal tenderness.   Musculoskeletal:      Cervical back: Neck supple.   Skin:     General: Skin is dry.      Findings: No rash.      Nails: There is no clubbing.   Neurological:      Mental Status: He is alert and oriented to person, place, and time.   Psychiatric:         Behavior: Behavior is cooperative.           Assessment & Plan .  Problem List Items Addressed This Visit        Medium    Hyperlipidemia    Current Assessment & Plan     Will order labs today and patient will return for results and shared decision making.           Hypertension - Primary    Current Assessment & Plan     Doing well; --Encouraged to watch salt, exercise more and lose weight.  Patient tolerated potassium well without side effects. I feel the benefits of the medication outweigh the risks.           Hypothyroidism    Current Assessment & Plan     Will order labs today and patient will return for results and shared decision making.              Low    Gastroesophageal reflux disease    Current Assessment & Plan     Doing well with medication.  Patient tolerated pantoprozole well without side effects. I feel the benefits of the medication outweigh the risks.           Hypomagnesemia    Current Assessment & Plan     Will order labs today and patient will return for results and shared decision making.                Unprioritized    Hematuria    Current Assessment & Plan     Will order labs today and patient will return for results and shared decision making.           Screening PSA (prostate specific antigen)    Current Assessment & Plan     Will  order labs today and patient will return for results and shared decision making.

## 2022-11-17 NOTE — ASSESSMENT & PLAN NOTE
Doing well with medication.  Patient tolerated pantoprozole well without side effects. I feel the benefits of the medication outweigh the risks.

## 2022-11-17 NOTE — ASSESSMENT & PLAN NOTE
Doing well; --Encouraged to watch salt, exercise more and lose weight.  Patient tolerated potassium well without side effects. I feel the benefits of the medication outweigh the risks.

## 2022-11-18 LAB
ALBUMIN SERPL-MCNC: 5.2 G/DL (ref 3.8–4.8)
ALBUMIN/GLOB SERPL: 2 {RATIO} (ref 1.2–2.2)
ALP SERPL-CCNC: 151 IU/L (ref 44–121)
ALT SERPL-CCNC: 50 IU/L (ref 0–44)
APPEARANCE UR: ABNORMAL
AST SERPL-CCNC: 48 IU/L (ref 0–40)
BILIRUB SERPL-MCNC: 1 MG/DL (ref 0–1.2)
BILIRUB UR QL STRIP: NEGATIVE
BUN SERPL-MCNC: 14 MG/DL (ref 8–27)
BUN/CREAT SERPL: 11 (ref 10–24)
CALCIUM SERPL-MCNC: 9.9 MG/DL (ref 8.6–10.2)
CHLORIDE SERPL-SCNC: 102 MMOL/L (ref 96–106)
CHOLEST SERPL-MCNC: 155 MG/DL (ref 100–199)
CHOLEST/HDLC SERPL: 3.4 RATIO (ref 0–5)
CO2 SERPL-SCNC: 21 MMOL/L (ref 20–29)
COLOR UR: YELLOW
CREAT SERPL-MCNC: 1.24 MG/DL (ref 0.76–1.27)
EGFRCR SERPLBLD CKD-EPI 2021: 65 ML/MIN/1.73
GLOBULIN SER CALC-MCNC: 2.6 G/DL (ref 1.5–4.5)
GLUCOSE SERPL-MCNC: 90 MG/DL (ref 70–99)
GLUCOSE UR QL STRIP: NEGATIVE
HDLC SERPL-MCNC: 45 MG/DL
HGB UR QL STRIP: NEGATIVE
KETONES UR QL STRIP: NEGATIVE
LDLC SERPL CALC-MCNC: 45 MG/DL (ref 0–99)
LEUKOCYTE ESTERASE UR QL STRIP: NEGATIVE
MAGNESIUM SERPL-MCNC: 1.7 MG/DL (ref 1.6–2.3)
NITRITE UR QL STRIP: NEGATIVE
PH UR STRIP: 6 [PH] (ref 5–7.5)
POTASSIUM SERPL-SCNC: 4.4 MMOL/L (ref 3.5–5.2)
PROT SERPL-MCNC: 7.8 G/DL (ref 6–8.5)
PROT UR QL STRIP: ABNORMAL
PSA SERPL-MCNC: 0.2 NG/ML (ref 0–4)
SODIUM SERPL-SCNC: 140 MMOL/L (ref 134–144)
SP GR UR STRIP: >=1.03 (ref 1–1.03)
TRIGL SERPL-MCNC: 444 MG/DL (ref 0–149)
TSH SERPL DL<=0.005 MIU/L-ACNC: 2.84 UIU/ML (ref 0.45–4.5)
UROBILINOGEN UR STRIP-MCNC: 0.2 MG/DL (ref 0.2–1)
VLDLC SERPL CALC-MCNC: 65 MG/DL (ref 5–40)

## 2022-11-28 ENCOUNTER — OFFICE VISIT (OUTPATIENT)
Dept: FAMILY MEDICINE CLINIC | Facility: CLINIC | Age: 67
End: 2022-11-28

## 2022-11-28 VITALS
BODY MASS INDEX: 24.16 KG/M2 | RESPIRATION RATE: 18 BRPM | WEIGHT: 159.4 LBS | HEIGHT: 68 IN | OXYGEN SATURATION: 98 % | SYSTOLIC BLOOD PRESSURE: 135 MMHG | HEART RATE: 78 BPM | DIASTOLIC BLOOD PRESSURE: 73 MMHG | TEMPERATURE: 97.5 F

## 2022-11-28 DIAGNOSIS — C18.9 MALIGNANT NEOPLASM OF COLON, UNSPECIFIED PART OF COLON: Primary | ICD-10-CM

## 2022-11-28 DIAGNOSIS — Z85.818 HISTORY OF CANCER TONSIL: ICD-10-CM

## 2022-11-28 DIAGNOSIS — N18.31 STAGE 3A CHRONIC KIDNEY DISEASE: ICD-10-CM

## 2022-11-28 DIAGNOSIS — Z83.3 FAMILY HISTORY OF DIABETES MELLITUS: ICD-10-CM

## 2022-11-28 DIAGNOSIS — E78.2 MIXED HYPERLIPIDEMIA: ICD-10-CM

## 2022-11-28 DIAGNOSIS — I10 PRIMARY HYPERTENSION: ICD-10-CM

## 2022-11-28 DIAGNOSIS — N40.0 BENIGN PROSTATIC HYPERPLASIA, UNSPECIFIED WHETHER LOWER URINARY TRACT SYMPTOMS PRESENT: ICD-10-CM

## 2022-11-28 DIAGNOSIS — K40.90 UNILATERAL INGUINAL HERNIA WITHOUT OBSTRUCTION OR GANGRENE, RECURRENCE NOT SPECIFIED: ICD-10-CM

## 2022-11-28 DIAGNOSIS — E03.8 OTHER SPECIFIED HYPOTHYROIDISM: ICD-10-CM

## 2022-11-28 DIAGNOSIS — Z00.01 ENCOUNTER FOR GENERAL ADULT MEDICAL EXAMINATION WITH ABNORMAL FINDINGS: ICD-10-CM

## 2022-11-28 DIAGNOSIS — N52.1 ERECTILE DYSFUNCTION DUE TO DISEASES CLASSIFIED ELSEWHERE: ICD-10-CM

## 2022-11-28 DIAGNOSIS — E87.6 HYPOKALEMIA: ICD-10-CM

## 2022-11-28 DIAGNOSIS — I45.10 RIGHT BUNDLE BRANCH BLOCK: ICD-10-CM

## 2022-11-28 DIAGNOSIS — R79.89 ELEVATED LIVER FUNCTION TESTS: ICD-10-CM

## 2022-11-28 DIAGNOSIS — Z00.00 WELCOME TO MEDICARE PREVENTIVE VISIT: ICD-10-CM

## 2022-11-28 DIAGNOSIS — Z82.3 FAMILY HISTORY OF STROKE: ICD-10-CM

## 2022-11-28 DIAGNOSIS — Z12.5 SCREENING PSA (PROSTATE SPECIFIC ANTIGEN): ICD-10-CM

## 2022-11-28 DIAGNOSIS — E83.42 HYPOMAGNESEMIA: ICD-10-CM

## 2022-11-28 DIAGNOSIS — H90.2 CONDUCTIVE HEARING LOSS, UNSPECIFIED LATERALITY: ICD-10-CM

## 2022-11-28 DIAGNOSIS — K21.9 GASTROESOPHAGEAL REFLUX DISEASE WITHOUT ESOPHAGITIS: ICD-10-CM

## 2022-11-28 DIAGNOSIS — R31.9 HEMATURIA, UNSPECIFIED TYPE: ICD-10-CM

## 2022-11-28 DIAGNOSIS — Z23 NEED FOR IMMUNIZATION AGAINST INFLUENZA: ICD-10-CM

## 2022-11-28 DIAGNOSIS — K21.9 GASTROESOPHAGEAL REFLUX DISEASE: ICD-10-CM

## 2022-11-28 DIAGNOSIS — R74.8 ELEVATED ALKALINE PHOSPHATASE LEVEL: ICD-10-CM

## 2022-11-28 PROCEDURE — G0008 ADMIN INFLUENZA VIRUS VAC: HCPCS | Performed by: FAMILY MEDICINE

## 2022-11-28 PROCEDURE — G0402 INITIAL PREVENTIVE EXAM: HCPCS | Performed by: FAMILY MEDICINE

## 2022-11-28 PROCEDURE — 90662 IIV NO PRSV INCREASED AG IM: CPT | Performed by: FAMILY MEDICINE

## 2022-11-28 PROCEDURE — 96160 PT-FOCUSED HLTH RISK ASSMT: CPT | Performed by: FAMILY MEDICINE

## 2022-11-28 PROCEDURE — 1159F MED LIST DOCD IN RCRD: CPT | Performed by: FAMILY MEDICINE

## 2022-11-28 PROCEDURE — 99214 OFFICE O/P EST MOD 30 MIN: CPT | Performed by: FAMILY MEDICINE

## 2022-11-28 RX ORDER — LANOLIN ALCOHOL/MO/W.PET/CERES
CREAM (GRAM) TOPICAL
Qty: 360 TABLET | Refills: 3
Start: 2022-11-28

## 2022-11-28 RX ORDER — POTASSIUM CHLORIDE 20 MEQ/1
20 TABLET, EXTENDED RELEASE ORAL 4 TIMES DAILY
Qty: 360 TABLET | Refills: 3
Start: 2022-11-28

## 2022-11-28 RX ORDER — ATORVASTATIN CALCIUM 10 MG/1
10 TABLET, FILM COATED ORAL DAILY
Qty: 90 TABLET | Refills: 3 | Status: SHIPPED | OUTPATIENT
Start: 2022-11-28

## 2022-11-28 RX ORDER — OMEPRAZOLE 20 MG/1
20 CAPSULE, DELAYED RELEASE ORAL DAILY
Qty: 90 CAPSULE | Refills: 3 | Status: SHIPPED | OUTPATIENT
Start: 2022-11-28

## 2022-12-18 PROBLEM — R80.8 OTHER PROTEINURIA: Status: ACTIVE | Noted: 2022-12-18

## 2022-12-21 DIAGNOSIS — E03.8 OTHER SPECIFIED HYPOTHYROIDISM: ICD-10-CM

## 2022-12-21 NOTE — TELEPHONE ENCOUNTER
Patient came in requesting 90 day supply of levothyroxine 88mcg be sent to [facility] [place].

## 2022-12-22 DIAGNOSIS — E03.8 OTHER SPECIFIED HYPOTHYROIDISM: ICD-10-CM

## 2022-12-22 RX ORDER — LEVOTHYROXINE SODIUM 88 UG/1
TABLET ORAL
Qty: 90 TABLET | Refills: 0 | Status: SHIPPED | OUTPATIENT
Start: 2022-12-22 | End: 2023-03-22 | Stop reason: SDUPTHER

## 2022-12-22 RX ORDER — LEVOTHYROXINE SODIUM 88 UG/1
88 TABLET ORAL DAILY
Qty: 90 TABLET | Refills: 0 | Status: SHIPPED | OUTPATIENT
Start: 2022-12-22 | End: 2023-03-22 | Stop reason: SDUPTHER

## 2023-03-15 LAB
ALBUMIN SERPL-MCNC: 4.8 G/DL (ref 3.8–4.8)
ALBUMIN/GLOB SERPL: 1.9 {RATIO} (ref 1.2–2.2)
ALP BONE CFR SERPL: 43 % (ref 12–68)
ALP INTEST CFR SERPL: 5 % (ref 0–18)
ALP LIVER CFR SERPL: 53 % (ref 13–88)
ALP SERPL-CCNC: 139 IU/L (ref 44–121)
ALT SERPL-CCNC: 41 IU/L (ref 0–44)
AST SERPL-CCNC: 29 IU/L (ref 0–40)
BILIRUB SERPL-MCNC: 0.7 MG/DL (ref 0–1.2)
BUN SERPL-MCNC: 14 MG/DL (ref 8–27)
BUN/CREAT SERPL: 11 (ref 10–24)
CALCIUM SERPL-MCNC: 9.6 MG/DL (ref 8.6–10.2)
CHLORIDE SERPL-SCNC: 101 MMOL/L (ref 96–106)
CHOLEST SERPL-MCNC: 165 MG/DL (ref 100–199)
CHOLEST/HDLC SERPL: 4.1 RATIO (ref 0–5)
CO2 SERPL-SCNC: 25 MMOL/L (ref 20–29)
CREAT SERPL-MCNC: 1.26 MG/DL (ref 0.76–1.27)
EGFRCR SERPLBLD CKD-EPI 2021: 63 ML/MIN/1.73
GLOBULIN SER CALC-MCNC: 2.5 G/DL (ref 1.5–4.5)
GLUCOSE SERPL-MCNC: 93 MG/DL (ref 70–99)
HDLC SERPL-MCNC: 40 MG/DL
LDLC SERPL CALC-MCNC: 47 MG/DL (ref 0–99)
POTASSIUM SERPL-SCNC: 4.3 MMOL/L (ref 3.5–5.2)
PROT SERPL-MCNC: 7.3 G/DL (ref 6–8.5)
SODIUM SERPL-SCNC: 140 MMOL/L (ref 134–144)
TRIGL SERPL-MCNC: 539 MG/DL (ref 0–149)
TSH SERPL DL<=0.005 MIU/L-ACNC: 3.61 UIU/ML (ref 0.45–4.5)
VLDLC SERPL CALC-MCNC: 78 MG/DL (ref 5–40)

## 2023-03-19 DIAGNOSIS — E03.8 OTHER SPECIFIED HYPOTHYROIDISM: ICD-10-CM

## 2023-03-22 ENCOUNTER — OFFICE VISIT (OUTPATIENT)
Dept: FAMILY MEDICINE CLINIC | Facility: CLINIC | Age: 68
End: 2023-03-22
Payer: MEDICARE

## 2023-03-22 VITALS
DIASTOLIC BLOOD PRESSURE: 70 MMHG | TEMPERATURE: 97.3 F | SYSTOLIC BLOOD PRESSURE: 133 MMHG | HEIGHT: 68 IN | OXYGEN SATURATION: 98 % | RESPIRATION RATE: 14 BRPM | BODY MASS INDEX: 24.58 KG/M2 | WEIGHT: 162.2 LBS | HEART RATE: 78 BPM

## 2023-03-22 DIAGNOSIS — E78.2 MIXED HYPERLIPIDEMIA: Primary | ICD-10-CM

## 2023-03-22 DIAGNOSIS — E87.6 HYPOKALEMIA: ICD-10-CM

## 2023-03-22 DIAGNOSIS — R74.8 ELEVATED ALKALINE PHOSPHATASE LEVEL: ICD-10-CM

## 2023-03-22 DIAGNOSIS — I10 PRIMARY HYPERTENSION: ICD-10-CM

## 2023-03-22 DIAGNOSIS — E83.42 HYPOMAGNESEMIA: ICD-10-CM

## 2023-03-22 DIAGNOSIS — E03.8 OTHER SPECIFIED HYPOTHYROIDISM: ICD-10-CM

## 2023-03-22 DIAGNOSIS — R79.89 ELEVATED LIVER FUNCTION TESTS: ICD-10-CM

## 2023-03-22 PROCEDURE — 3078F DIAST BP <80 MM HG: CPT | Performed by: FAMILY MEDICINE

## 2023-03-22 PROCEDURE — 99214 OFFICE O/P EST MOD 30 MIN: CPT | Performed by: FAMILY MEDICINE

## 2023-03-22 PROCEDURE — 3075F SYST BP GE 130 - 139MM HG: CPT | Performed by: FAMILY MEDICINE

## 2023-03-22 RX ORDER — LEVOTHYROXINE SODIUM 88 UG/1
88 TABLET ORAL DAILY
Qty: 90 TABLET | Refills: 1 | Status: SHIPPED | OUTPATIENT
Start: 2023-03-22 | End: 2023-03-22 | Stop reason: SDUPTHER

## 2023-03-22 RX ORDER — LEVOTHYROXINE SODIUM 88 UG/1
TABLET ORAL
Qty: 90 TABLET | Refills: 0 | Status: SHIPPED | OUTPATIENT
Start: 2023-03-22

## 2023-03-22 NOTE — ASSESSMENT & PLAN NOTE
Doing well; --TSH 3.610  Patient tolerated synthroid well without side effects. I feel the benefits of the medication outweigh the risks.

## 2023-03-22 NOTE — PROGRESS NOTES
Subjective   Lavelle Mitchell is a 67 y.o. male.     Hypertension  This is a chronic problem. The current episode started more than 1 year ago. The problem has been gradually improving since onset. The problem is controlled. Pertinent negatives include no chest pain, palpitations or shortness of breath.   Hyperlipidemia  This is a chronic problem. The current episode started more than 1 year ago. The problem is controlled. Exacerbating diseases include hypothyroidism. Pertinent negatives include no chest pain, myalgias or shortness of breath. Current antihyperlipidemic treatment includes statins.   Hypothyroidism  This is a chronic problem. The current episode started more than 1 year ago. The problem has been gradually improving. Pertinent negatives include no chest pain, fatigue, myalgias or nausea.        The following portions of the patient's history were reviewed and updated as appropriate: current medications, past family history, past medical history, past social history, past surgical history and problem list.    Family History   Problem Relation Age of Onset   • Heart disease Mother    • Hyperlipidemia Mother    • Diabetes Mother    • Cancer Mother         stomach   • Vision loss Mother         mother hole in her eye   • Heart disease Father    • Stroke Father    • Hyperlipidemia Father    • Other Father         colon polyps   • Hypertension Sister    • Diabetes Sister    • Birth defects Maternal Grandmother    • Birth defects Paternal Grandmother        Social History     Tobacco Use   • Smoking status: Former     Types: Pipe     Quit date: 2008     Years since quittin.7   • Smokeless tobacco: Never   Substance Use Topics   • Alcohol use: Yes     Comment: 1 drink weekly   • Drug use: No       Past Surgical History:   Procedure Laterality Date   • CHOLECYSTECTOMY     • COLON SURGERY     • COLONOSCOPY     • LYMPH NODE BIOPSY         Patient Active Problem List   Diagnosis   • Hyperlipidemia   •  Hypertension   • Hypothyroidism   • Elevated alkaline phosphatase level   • Elevated liver function tests   • Hypomagnesemia   • Malignant neoplasm of colon (HCC)   • Right bundle branch block   • Hearing loss   • Screening PSA (prostate specific antigen)   • History of cancer tonsil   • Unilateral inguinal hernia without obstruction or gangrene   • Hypokalemia   • Gastroesophageal reflux disease   • BPH (benign prostatic hyperplasia)   • Hematuria   • Encounter for general adult medical examination with abnormal findings   • Family history of stroke   • Family history of diabetes mellitus   • Erectile dysfunction due to diseases classified elsewhere   • Welcome to Medicare preventive visit   • Need for immunization against influenza   • Other proteinuria       Current Outpatient Medications on File Prior to Visit   Medication Sig Dispense Refill   • atorvastatin (LIPITOR) 10 MG tablet Take 1 tablet by mouth Daily. 90 tablet 3   • levothyroxine (SYNTHROID, LEVOTHROID) 88 MCG tablet TAKE 1 TABLET BY MOUTH EVERY DAY 90 tablet 0   • magnesium chloride ER (MagDelay) 64 MG DR tablet Take 4 pills daily 360 tablet 3   • omeprazole (priLOSEC) 20 MG capsule Take 1 capsule by mouth Daily. 90 capsule 3   • potassium chloride (K-DUR,KLOR-CON) 20 MEQ CR tablet Take 1 tablet by mouth 4 (Four) Times a Day. 360 tablet 3     No current facility-administered medications on file prior to visit.       No Known Allergies    Review of Systems   Constitutional: Negative for appetite change, fatigue, unexpected weight gain and unexpected weight loss.   Respiratory: Negative for shortness of breath.    Cardiovascular: Negative for chest pain, palpitations and leg swelling.   Gastrointestinal: Negative for nausea.   Endocrine: Negative for cold intolerance and heat intolerance.   Musculoskeletal: Negative for myalgias.   Skin: Negative for dry skin.   Neurological: Negative for headache.       Objective   Visit Vitals  /70 (BP  "Location: Left arm, Patient Position: Sitting, Cuff Size: Adult)   Pulse 78   Temp 97.3 °F (36.3 °C)   Resp 14   Ht 172.7 cm (68\")   Wt 73.6 kg (162 lb 3.2 oz)   SpO2 98%   BMI 24.66 kg/m²     Physical Exam  Vitals and nursing note reviewed.   Constitutional:       Appearance: He is well-developed.   HENT:      Head: Normocephalic.   Neck:      Thyroid: No thyromegaly.      Vascular: No carotid bruit.      Trachea: Trachea normal.   Cardiovascular:      Rate and Rhythm: Normal rate and regular rhythm.      Heart sounds: No murmur heard.    No friction rub. No gallop.   Pulmonary:      Effort: Pulmonary effort is normal. No respiratory distress.      Breath sounds: Normal breath sounds. No wheezing.   Chest:      Chest wall: No tenderness.   Musculoskeletal:      Cervical back: Neck supple.   Skin:     General: Skin is dry.      Findings: No rash.      Nails: There is no clubbing.   Neurological:      Mental Status: He is alert and oriented to person, place, and time.   Psychiatric:         Behavior: Behavior is cooperative.           Assessment & Plan .  Problem List Items Addressed This Visit        Medium    Hyperlipidemia - Primary    Current Assessment & Plan     Worsening  Encouraged to watch fatty intake, exercise more, and lose weight. compliant with medication  Patient tolerated lipitor well without side effects. I feel the benefits of the medication outweigh the risks.    Is not getting adequate diet and exercise  Goals developed at last visit were not met   Follow up in 3  months  Care management needs are self-addressed. Self-management abilities addressed and patient is capable of managing his own disease.           Relevant Orders    Lipid Panel With / Chol / HDL Ratio    Comprehensive Metabolic Panel    Hypertension    Current Assessment & Plan     Doing well; Encouraged to watch salt, exercise more and lose weight.  No medication         Hypothyroidism    Current Assessment & Plan     Doing well; " --TSH 3.610  Patient tolerated synthroid well without side effects. I feel the benefits of the medication outweigh the risks.           Relevant Orders    TSH       Low    Elevated alkaline phosphatase level    Current Assessment & Plan     Alk phos 1319 down from 159; Alk phos fractionated-normal.  Follow conservatively         Elevated liver function tests    Current Assessment & Plan     Resolved x 1         Hypokalemia    Current Assessment & Plan     Resolved x 3         Hypomagnesemia    Current Assessment & Plan     Will order labs today and patient will return for results and shared decision making.           Relevant Orders    Magnesium

## 2023-03-22 NOTE — ASSESSMENT & PLAN NOTE
Worsening  Encouraged to watch fatty intake, exercise more, and lose weight. compliant with medication  Patient tolerated lipitor well without side effects. I feel the benefits of the medication outweigh the risks.    Is not getting adequate diet and exercise  Goals developed at last visit were not met   Follow up in 3  months  Care management needs are self-addressed. Self-management abilities addressed and patient is capable of managing his own disease.

## 2023-06-16 LAB
ALBUMIN SERPL-MCNC: 4.6 G/DL (ref 3.8–4.8)
ALBUMIN/GLOB SERPL: 1.8 {RATIO} (ref 1.2–2.2)
ALP SERPL-CCNC: 127 IU/L (ref 44–121)
ALT SERPL-CCNC: 43 IU/L (ref 0–44)
AST SERPL-CCNC: 30 IU/L (ref 0–40)
BILIRUB SERPL-MCNC: 0.8 MG/DL (ref 0–1.2)
BUN SERPL-MCNC: 14 MG/DL (ref 8–27)
BUN/CREAT SERPL: 11 (ref 10–24)
CALCIUM SERPL-MCNC: 9.7 MG/DL (ref 8.6–10.2)
CHLORIDE SERPL-SCNC: 104 MMOL/L (ref 96–106)
CHOLEST SERPL-MCNC: 155 MG/DL (ref 100–199)
CHOLEST/HDLC SERPL: 3.7 RATIO (ref 0–5)
CO2 SERPL-SCNC: 23 MMOL/L (ref 20–29)
CREAT SERPL-MCNC: 1.25 MG/DL (ref 0.76–1.27)
EGFRCR SERPLBLD CKD-EPI 2021: 64 ML/MIN/1.73
GLOBULIN SER CALC-MCNC: 2.6 G/DL (ref 1.5–4.5)
GLUCOSE SERPL-MCNC: 86 MG/DL (ref 70–99)
HDLC SERPL-MCNC: 42 MG/DL
LDLC SERPL CALC-MCNC: 51 MG/DL (ref 0–99)
MAGNESIUM SERPL-MCNC: 1.7 MG/DL (ref 1.6–2.3)
POTASSIUM SERPL-SCNC: 4.5 MMOL/L (ref 3.5–5.2)
PROT SERPL-MCNC: 7.2 G/DL (ref 6–8.5)
SODIUM SERPL-SCNC: 141 MMOL/L (ref 134–144)
TRIGL SERPL-MCNC: 415 MG/DL (ref 0–149)
TSH SERPL DL<=0.005 MIU/L-ACNC: 2.94 UIU/ML (ref 0.45–4.5)
VLDLC SERPL CALC-MCNC: 62 MG/DL (ref 5–40)

## 2023-09-12 DIAGNOSIS — E03.8 OTHER SPECIFIED HYPOTHYROIDISM: ICD-10-CM

## 2023-09-12 RX ORDER — LEVOTHYROXINE SODIUM 88 UG/1
TABLET ORAL
Qty: 90 TABLET | Refills: 0 | Status: SHIPPED | OUTPATIENT
Start: 2023-09-12

## 2023-11-16 DIAGNOSIS — E78.2 MIXED HYPERLIPIDEMIA: ICD-10-CM

## 2023-11-16 DIAGNOSIS — K21.9 GASTROESOPHAGEAL REFLUX DISEASE: ICD-10-CM

## 2023-11-16 RX ORDER — OMEPRAZOLE 20 MG/1
20 CAPSULE, DELAYED RELEASE ORAL DAILY
Qty: 90 CAPSULE | Refills: 0 | Status: SHIPPED | OUTPATIENT
Start: 2023-11-16

## 2023-11-16 RX ORDER — ATORVASTATIN CALCIUM 10 MG/1
10 TABLET, FILM COATED ORAL DAILY
Qty: 90 TABLET | Refills: 0 | Status: SHIPPED | OUTPATIENT
Start: 2023-11-16

## 2023-12-06 ENCOUNTER — OFFICE VISIT (OUTPATIENT)
Dept: FAMILY MEDICINE CLINIC | Facility: CLINIC | Age: 68
End: 2023-12-06
Payer: MEDICARE

## 2023-12-06 VITALS
TEMPERATURE: 97.3 F | WEIGHT: 162.6 LBS | BODY MASS INDEX: 24.64 KG/M2 | DIASTOLIC BLOOD PRESSURE: 80 MMHG | SYSTOLIC BLOOD PRESSURE: 135 MMHG | RESPIRATION RATE: 14 BRPM | OXYGEN SATURATION: 100 % | HEART RATE: 80 BPM | HEIGHT: 68 IN

## 2023-12-06 DIAGNOSIS — Z71.85 IMMUNIZATION COUNSELING: ICD-10-CM

## 2023-12-06 DIAGNOSIS — Z23 FLU VACCINE NEED: ICD-10-CM

## 2023-12-06 DIAGNOSIS — E83.42 HYPOMAGNESEMIA: ICD-10-CM

## 2023-12-06 DIAGNOSIS — C18.9 MALIGNANT NEOPLASM OF COLON, UNSPECIFIED PART OF COLON: Primary | ICD-10-CM

## 2023-12-06 DIAGNOSIS — Z23 PNEUMOCOCCAL VACCINE ADMINISTERED: ICD-10-CM

## 2023-12-06 DIAGNOSIS — I10 PRIMARY HYPERTENSION: ICD-10-CM

## 2023-12-06 DIAGNOSIS — K21.9 GASTROESOPHAGEAL REFLUX DISEASE WITHOUT ESOPHAGITIS: ICD-10-CM

## 2023-12-06 DIAGNOSIS — Z00.00 MEDICARE ANNUAL WELLNESS VISIT, SUBSEQUENT: Primary | ICD-10-CM

## 2023-12-06 DIAGNOSIS — E03.8 OTHER SPECIFIED HYPOTHYROIDISM: ICD-10-CM

## 2023-12-06 DIAGNOSIS — E78.2 MIXED HYPERLIPIDEMIA: ICD-10-CM

## 2023-12-06 DIAGNOSIS — Z12.5 SCREENING PSA (PROSTATE SPECIFIC ANTIGEN): ICD-10-CM

## 2023-12-06 DIAGNOSIS — Z13.31 DEPRESSION SCREEN: ICD-10-CM

## 2023-12-06 DIAGNOSIS — Z78.9 ADVANCED DIRECTIVE PLACED IN CHART THIS ADMISSION: ICD-10-CM

## 2023-12-06 DIAGNOSIS — I45.10 RIGHT BUNDLE BRANCH BLOCK: ICD-10-CM

## 2023-12-06 PROCEDURE — 99214 OFFICE O/P EST MOD 30 MIN: CPT | Performed by: FAMILY MEDICINE

## 2023-12-06 NOTE — PROGRESS NOTES
Subsequent Medicare Wellness Visit        Subjective    History of Present Illness:  Lavelle Mitchell is a 68 y.o. male who presents for a Subsequent Medicare Wellness Visit.    The following portions of the patient's history were reviewed and   updated as appropriate: allergies, current medications, past family history, past medical history, past social history, past surgical history, and problem list.      Recent Hospitalizations:  He was not admitted to the hospital during the last year.       Current Medical Providers:  Patient Care Team:  Paolo Penny MD as PCP - General  Paolo Penny MD as PCP - Family Medicine  Dr. Burton ([facility])    Outpatient Medications Prior to Visit   Medication Sig Dispense Refill    atorvastatin (LIPITOR) 10 MG tablet TAKE 1 TABLET BY MOUTH EVERY DAY 90 tablet 0    magnesium chloride ER (MagDelay) 64 MG DR tablet Take 4 pills daily 360 tablet 3    omeprazole (priLOSEC) 20 MG capsule TAKE 1 CAPSULE BY MOUTH EVERY DAY 90 capsule 0    potassium chloride (K-DUR,KLOR-CON) 20 MEQ CR tablet Take 1 tablet by mouth 4 (Four) Times a Day. 360 tablet 3    levothyroxine (SYNTHROID, LEVOTHROID) 88 MCG tablet TAKE 1 TABLET BY MOUTH EVERY DAY 90 tablet 0     No facility-administered medications prior to visit.       No opioid medication identified on active medication list. I have reviewed chart for other potential  high risk medication/s and harmful drug interactions in the elderly.        Aspirin is not on active medication list.  Aspirin use is not indicated based on review of current medical condition/s. Risk of harm outweighs potential benefits.  .    Patient Active Problem List   Diagnosis    Hyperlipidemia    Hypertension    Hypothyroidism    Elevated alkaline phosphatase level    Elevated liver function tests    Hypomagnesemia    Malignant neoplasm of colon    Right bundle branch block    Hearing loss    Screening PSA (prostate specific antigen)    History of cancer  "tonsil    Unilateral inguinal hernia without obstruction or gangrene    Hypokalemia    Gastroesophageal reflux disease    BPH (benign prostatic hyperplasia)    Hematuria    Encounter for general adult medical examination with abnormal findings    Family history of stroke    Family history of diabetes mellitus    Erectile dysfunction due to diseases classified elsewhere    Welcome to Medicare preventive visit    Need for immunization against influenza    Other proteinuria    Medicare annual wellness visit, subsequent    Advanced directive placed in chart this admission    Immunization counseling    Depression screen            Above medical problems all reviewed and significant problems identified and reviewed in the E and M visit.   Objective          Vitals:    12/06/23 0843   BP: 135/80   BP Location: Left arm   Patient Position: Sitting   Cuff Size: Adult   Pulse: 80   Resp: 14   Temp: 97.3 °F (36.3 °C)   SpO2: 100%   Weight: 73.8 kg (162 lb 9.6 oz)   Height: 172.7 cm (68\")     Estimated body mass index is 24.72 kg/m² as calculated from the following:    Height as of this encounter: 172.7 cm (68\").    Weight as of this encounter: 73.8 kg (162 lb 9.6 oz).    BMI is within normal parameters. No other follow-up for BMI required.      Does the patient have evidence of cognitive impairment? No           Family History   Problem Relation Age of Onset    Heart disease Mother     Hyperlipidemia Mother     Diabetes Mother     Cancer Mother         stomach    Vision loss Mother         mother hole in her eye    Heart disease Father     Stroke Father     Hyperlipidemia Father     Other Father         colon polyps    Hypertension Sister     Diabetes Sister     Birth defects Maternal Grandmother     Birth defects Paternal Grandmother        Compared to one year ago, the patient feels his physical   health is better.    Compared to one year ago, the patient feels his mental   health is the same.    HEALTH RISK " ASSESSMENT    Smoking Status:  Social History     Tobacco Use   Smoking Status Former    Types: Pipe    Quit date: 2008    Years since quitting: 15.4   Smokeless Tobacco Never     Alcohol Consumption:  Social History     Substance and Sexual Activity   Alcohol Use Yes    Comment: 1 drink weekly     Fall Risk Screen:    YONAS Fall Risk Assessment was completed, and patient is at LOW risk for falls.Assessment completed on:2023    Depression Screening:  Depression screen reviewed and discussed with patient. Recommendations were not needed. Medications were not discussed, counseling was not discussed. We spent 3 minutes with the screen and discussion of depression and options.         2023     8:47 AM   PHQ-2/PHQ-9 Depression Screening   Little Interest or Pleasure in Doing Things 0-->not at all   Feeling Down, Depressed or Hopeless 0-->not at all   PHQ-9: Brief Depression Severity Measure Score 0       Health Habits and Functional and Cognitive Screenin/6/2023     8:44 AM   Functional & Cognitive Status   Do you have difficulty preparing food and eating? No   Do you have difficulty bathing yourself, getting dressed or grooming yourself? No   Do you have difficulty using the toilet? No   Do you have difficulty moving around from place to place? No   Do you have trouble with steps or getting out of a bed or a chair? No   Current Diet Well Balanced Diet   Dental Exam Other        Dental Exam Comment No teeth   Eye Exam Up to date        Eye Exam Comment -   Exercise (times per week) 3 times per week   Current Exercises Include Weightlifting;Treadmill   Do you need help using the phone?  No   Are you deaf or do you have serious difficulty hearing?  No   Do you need help to go to places out of walking distance? No   Do you need help shopping? No   Do you need help preparing meals?  No   Do you need help with housework?  No   Do you need help with laundry? No   Do you need help taking  your medications? No   Do you need help managing money? No   Do you ever drive or ride in a car without wearing a seat belt? No   Have you felt unusual stress, anger or loneliness in the last month? No   Who do you live with? Spouse   If you need help, do you have trouble finding someone available to you? No   Have you been bothered in the last four weeks by sexual problems? No   Do you have difficulty concentrating, remembering or making decisions? No       Age-appropriate Screening Schedule:  Refer to the list below for future screening recommendations based on patient's age, sex and/or medical conditions. Orders for these recommended tests are listed in the plan section. The patient has been provided with a written plan.    Health Maintenance   Topic Date Due    HEPATITIS C SCREENING  Never done    COVID-19 Vaccine (3 - 2023-24 season) 09/01/2023    ANNUAL WELLNESS VISIT  12/06/2024    LIPID PANEL  12/06/2024    TDAP/TD VACCINES (2 - Td or Tdap) 10/22/2029    INFLUENZA VACCINE  Completed    Pneumococcal Vaccine 65+  Completed    AAA SCREEN (ONE-TIME)  Completed    ZOSTER VACCINE  Completed    COLONOSCOPY  Discontinued       Immunizations:  Lavelle Mitchell is due for Shingrix and Prevnar    Colorectal Screening  Lavelle Mitchell last colonoscopy was done by his surgeon who did his colon resection.  He states he was advised he did not need to repeat this ever  Last Completed Colonoscopy            Discontinued - COLONOSCOPY  Discontinued        Frequency changed to Never automatically (Topic No Longer Applies)    03/01/2017  Done - Tran                       Assessment & Plan     Risk Factors Identified During Encounter      Fall Risk-High or Moderate: Sit to Stand Exercise Information Shared in After Visit Summary  The above risks/problems have been discussed with the patient.  Follow up actions/plans if indicated are seen below in the Assessment/Plan Section.  Pertinent information has been shared with the  patient in the After Visit Summary.    I have identified multiple medical problems which are significant and separately identifiable that require added work above and beyond the wellness visit. These are not trivial or insignificant and are billed with a 25-modifier  These are in a separate E/M note      Sit-to-Stand Exercise    The sit-to-stand exercise (also known as the chair stand or chair rise exercise) strengthens your lower body and helps you maintain or improve your mobility and independence. The goal is to do the sit-to-stand exercise without using your hands. This will be easier as you become stronger. You should always talk with your health care provider before starting any exercise program, especially if you have had recent surgery.  Do the exercise exactly as told by your health care provider and adjust it as directed. It is normal to feel mild stretching, pulling, tightness, or discomfort as you do this exercise, but you should stop right away if you feel sudden pain or your pain gets worse. Do not begin doing this exercise until told by your health care provider.  What the sit-to-stand exercise does  The sit-to-stand exercise helps to strengthen the muscles in your thighs and the muscles in the center of your body that give you stability (core muscles). This exercise is especially helpful if:  You have had knee or hip surgery.  You have trouble getting up from a chair, out of a car, or off the toilet.  How to do the sit-to-stand exercise  Sit toward the front edge of a sturdy chair without armrests. Your knees should be bent and your feet should be flat on the floor and shoulder-width apart.  Place your hands lightly on each side of the seat. Keep your back and neck as straight as possible, with your chest slightly forward.  Breathe in slowly. Lean forward and slightly shift your weight to the front of your feet.  Breathe out as you slowly stand up. Use your hands as little as possible.  Stand and  pause for a full breath in and out.  Breathe in as you sit down slowly. Tighten your core and abdominal muscles to control your lowering as much as possible.  Breathe out slowly.  Do this exercise 10-15 times. If needed, do it fewer times until you build up strength.  Rest for 1 minute, then do another set of 10-15 repetitions.  To change the difficulty of the sit-to-stand exercise  If the exercise is too difficult, use a chair with sturdy armrests, and push off the armrests to help you come to the standing position. You can also use the armrests to help slowly lower yourself back to sitting. As this gets easier, try to use your arms less. You can also place a firm cushion or pillow on the chair to make the surface higher.  If this exercise is too easy, do not use your arms to help raise or lower yourself. You can also wear a weighted vest, use hand weights, increase your repetitions, or try a lower chair.  General tips  You may feel tired when starting an exercise routine. This is normal.  You may have muscle soreness that lasts a few days. This is normal. As you get stronger, you may not feel muscle soreness.  Use smooth, steady movements.  Do not  hold your breath during strength exercises. This can cause unsafe changes in your blood pressure.  Breathe in slowly through your nose, and breathe out slowly through your mouth.  Summary  Strengthening your lower body is an important step to help you move safely and independently.  The sit-to-stand exercise helps strengthen the muscles in your thighs and core.  You should always talk with your health care provider before starting any exercise program, especially if you have had recent surgery.  This information is not intended to replace advice given to you by your health care provider. Make sure you discuss any questions you have with your health care provider.  Document Revised: 10/16/2019 Document Reviewed: 02/08/2018  Elsevier Patient Education © 2021 [employer]      Fall Prevention in the Home, Adult  Falls can cause injuries and can happen to people of all ages. There are many things you can do to make your home safe and to help prevent falls. Ask for help when making these changes.  What actions can I take to prevent falls?  General Instructions  Use good lighting in all rooms. Replace any light bulbs that burn out.  Turn on the lights in dark areas. Use night-lights.  Keep items that you use often in easy-to-reach places. Lower the shelves around your home if needed.  Set up your furniture so you have a clear path. Avoid moving your furniture around.  Do not have throw rugs or other things on the floor that can make you trip.  Avoid walking on wet floors.  If any of your floors are uneven, fix them.  Add color or contrast paint or tape to clearly sergio and help you see:  Grab bars or handrails.  First and last steps of staircases.  Where the edge of each step is.  If you use a stepladder:  Make sure that it is fully opened. Do not climb a closed stepladder.  Make sure the sides of the stepladder are locked in place.  Ask someone to hold the stepladder while you use it.  Know where your pets are when moving through your home.  What can I do in the bathroom?         Keep the floor dry. Clean up any water on the floor right away.  Remove soap buildup in the tub or shower.  Use nonskid mats or decals on the floor of the tub or shower.  Attach bath mats securely with double-sided, nonslip rug tape.  If you need to sit down in the shower, use a plastic, nonslip stool.  Install grab bars by the toilet and in the tub and shower. Do not use towel bars as grab bars.  What can I do in the bedroom?  Make sure that you have a light by your bed that is easy to reach.  Do not use any sheets or blankets for your bed that hang to the floor.  Have a firm chair with side arms that you can use for support when you get dressed.  What can I do in the kitchen?  Clean up any spills right  away.  If you need to reach something above you, use a step stool with a grab bar.  Keep electrical cords out of the way.  Do not use floor polish or wax that makes floors slippery.  What can I do with my stairs?  Do not leave any items on the stairs.  Make sure that you have a light switch at the top and the bottom of the stairs.  Make sure that there are handrails on both sides of the stairs. Fix handrails that are broken or loose.  Install nonslip stair treads on all your stairs.  Avoid having throw rugs at the top or bottom of the stairs.  Choose a carpet that does not hide the edge of the steps on the stairs.  Check carpeting to make sure that it is firmly attached to the stairs. Fix carpet that is loose or worn.  What can I do on the outside of my home?  Use bright outdoor lighting.  Fix the edges of walkways and driveways and fix any cracks.  Remove anything that might make you trip as you walk through a door, such as a raised step or threshold.  Trim any bushes or trees on paths to your home.  Check to see if handrails are loose or broken and that both sides of all steps have handrails.  Install guardrails along the edges of any raised decks and porches.  Clear paths of anything that can make you trip, such as tools or rocks.  Have leaves, snow, or ice cleared regularly.  Use sand or salt on paths during winter.  Clean up any spills in your garage right away. This includes grease or oil spills.  What other actions can I take?  Wear shoes that:  Have a low heel. Do not wear high heels.  Have rubber bottoms.  Feel good on your feet and fit well.  Are closed at the toe. Do not wear open-toe sandals.  Use tools that help you move around if needed. These include:  Canes.  Walkers.  Scooters.  Crutches.  Review your medicines with your doctor. Some medicines can make you feel dizzy. This can increase your chance of falling.  Ask your doctor what else you can do to help prevent falls.  Where to find more  information  Centers for Disease Control and Prevention, STEAD[place]: www.cdc.gov  National [place] on Aging: www.gumaro.nih.gov  Contact a doctor if:  You are afraid of falling at home.  You feel weak, drowsy, or dizzy at home.  You fall at home.  Summary  There are many simple things that you can do to make your home safe and to help prevent falls.  Ways to make your home safe include removing things that can make you trip and installing grab bars in the bathroom.  Ask for help when making these changes in your home.  This information is not intended to replace advice given to you by your health care provider. Make sure you discuss any questions you have with your health care provider.  Document Revised: 07/21/2021 Document Reviewed: 07/21/2021  [employer] Patient Education © 2021 [employer]            Advance Care Planning    Advance Directive is on file.  ACP discussion was held with the patient during this visit. Patient has an advance directive in EMR which is still valid.   Discussion with Patient regarding advanced directives. POST form discussed at length and reviewed with patient. POST reviewed and updated today. I spent 16 minutes  with patient reviewing information and documenting  in the chart.  Patient states he does want CPR. Reviewed medical interventions with patient and the differences between each: Comfort, Limited and Full. Patient opted for Full. Discussed the use of antibiotics at the end of life. He chose to use antibiotics consistent with treatment goals. Discussed artificially administered nutrition, patient is aware that if he is alert and oriented they can change their mind at any time. However, they have elected to have no artificial nutrition. Patient has identified his spouse Teresa Mitchell as his healthcare representative. Advised to discuss with healthcare representative if they can comply with wishes. Patient encouraged to have a meeting to discuss his decision regarding advanced care  directives and goals of care with extended family and significant  friends  In regard to the POST form:The patient opted to complete POST while in the office and copy scanned into the chart. and advised to give to family members, place in an easily accessible place and take with them if going to the hospital or Emergency room.      Follow Up:   Future Appointments         Provider Department Center    12/12/2023 9:00 AM Paolo Penny MD [facility]            An After Visit Summary and PPPS were made available to the patient.      Diagnoses and all orders for this visit:    1. Medicare annual wellness visit, subsequent (Primary)  Assessment & Plan:  Completed--POST and Depression screen also done        2. Advanced directive placed in chart this admission  Assessment & Plan:  POST completed and scanned into chart      3. Immunization counseling  Overview:  Flu and Prevnar done 12-6-23  Now Up to date      4. Flu vaccine need  -     Fluzone High-Dose 65+yrs    5. Pneumococcal vaccine administered  -     Pneumococcal Conjugate Vaccine 20-Valent All    6. Depression screen  Overview:  Completed 12/6/2023--patient is low risk

## 2023-12-06 NOTE — PROGRESS NOTES
Subjective   Lavelle Mitchell is a 68 y.o. male.   No chief complaint on file.    I have identified multiple medical problems which are significant and separately identifiable that require added work above and beyond the wellness visit. These are not trivial or insignificant and are billed with a 25-modifier    History of Present Illness    Right bundle branch block    Colon cancer      Hypertension  This is a chronic problem. The current episode started more than 1 year ago. The problem has been gradually improving since onset. The problem is controlled. Pertinent negatives include no chest pain.   Heartburn  He complains of heartburn. He reports no abdominal pain, no belching, no chest pain, no choking, no coughing, no dysphagia, no early satiety, no globus sensation, no hoarse voice, no nausea, no sore throat, no stridor, no tooth decay, no water brash or no wheezing. This is a chronic problem. The current episode started more than 1 year ago. The problem occurs rarely.                  Review of Systems   HENT:  Negative for hoarse voice and sore throat.    Respiratory:  Negative for cough, choking and wheezing.    Cardiovascular:  Negative for chest pain.   Gastrointestinal:  Negative for abdominal pain, dysphagia and nausea.       Objective     Physical Exam  Vitals and nursing note reviewed.   Constitutional:       Appearance: He is well-developed.   HENT:      Head: Normocephalic.   Neck:      Thyroid: No thyromegaly.      Vascular: No carotid bruit.      Trachea: Trachea normal.   Cardiovascular:      Rate and Rhythm: Normal rate and regular rhythm.      Heart sounds: No murmur heard.     No friction rub. No gallop.   Pulmonary:      Effort: Pulmonary effort is normal. No respiratory distress.      Breath sounds: Normal breath sounds. No wheezing.   Chest:      Chest wall: No tenderness.   Musculoskeletal:      Cervical back: Neck supple.   Skin:     General: Skin is dry.      Findings: No rash.      Nails:  There is no clubbing.   Neurological:      Mental Status: He is alert and oriented to person, place, and time.   Psychiatric:         Behavior: Behavior is cooperative.         Assessment & Plan   Problem List Items Addressed This Visit          Medium    Hyperlipidemia    Current Assessment & Plan     Will order labs today and patient will return for results and shared decision making.           Relevant Orders    Lipid Panel With / Chol / HDL Ratio (Completed)    Comprehensive Metabolic Panel (Completed)    Hypertension    Overview     Elevated Trig at risk for pancretitis         Current Assessment & Plan       Doing well; Encouraged to watch salt, exercise more and lose weight.  No medication         Hypothyroidism    Current Assessment & Plan     Will order labs today and patient will return for results and shared decision making.           Relevant Orders    TSH (Completed)    Malignant neoplasm of colon - Primary    Current Assessment & Plan     Patient states he needs no further colonoscopy.  I will research prior to next visit,if he needs a follow up with Gastroenterologist or colorectal surgeon.             Low    Gastroesophageal reflux disease    Current Assessment & Plan     Doing well;  Patient tolerated omeprazole well without side effects. I feel the benefits of the medication outweigh the risks.           Hypomagnesemia    Current Assessment & Plan     Will order labs today and patient will return for results and shared decision making.           Relevant Orders    Magnesium (Completed)    Right bundle branch block    Current Assessment & Plan     EKG done today and read by myself shows normal sinus rhythm with ventricular rate of 64. Moderate Intraventricular conduction delay-Stable from 11-28-22            Unprioritized    Screening PSA (prostate specific antigen)    Current Assessment & Plan     Will order labs today and patient will return for results and shared decision making.            Relevant Orders    PSA SCREENING (Completed)

## 2023-12-06 NOTE — ASSESSMENT & PLAN NOTE
Patient states he needs no further colonoscopy.  I will research prior to next visit,if he needs a follow up with Gastroenterologist or colorectal surgeon.

## 2023-12-06 NOTE — ASSESSMENT & PLAN NOTE
Doing well;  Patient tolerated omeprazole well without side effects. I feel the benefits of the medication outweigh the risks.

## 2023-12-06 NOTE — ASSESSMENT & PLAN NOTE
EKG done today and read by myself shows normal sinus rhythm with ventricular rate of 64. Moderate Intraventricular conduction delay-Stable from 11-28-22

## 2023-12-07 DIAGNOSIS — E03.8 OTHER SPECIFIED HYPOTHYROIDISM: ICD-10-CM

## 2023-12-07 LAB
ALBUMIN SERPL-MCNC: 5 G/DL (ref 3.9–4.9)
ALBUMIN/GLOB SERPL: 2.1 {RATIO} (ref 1.2–2.2)
ALP SERPL-CCNC: 139 IU/L (ref 44–121)
ALT SERPL-CCNC: 34 IU/L (ref 0–44)
AST SERPL-CCNC: 27 IU/L (ref 0–40)
BILIRUB SERPL-MCNC: 0.7 MG/DL (ref 0–1.2)
BUN SERPL-MCNC: 16 MG/DL (ref 8–27)
BUN/CREAT SERPL: 13 (ref 10–24)
CALCIUM SERPL-MCNC: 10 MG/DL (ref 8.6–10.2)
CHLORIDE SERPL-SCNC: 102 MMOL/L (ref 96–106)
CHOLEST SERPL-MCNC: 141 MG/DL (ref 100–199)
CHOLEST/HDLC SERPL: 3.2 RATIO (ref 0–5)
CO2 SERPL-SCNC: 23 MMOL/L (ref 20–29)
CREAT SERPL-MCNC: 1.28 MG/DL (ref 0.76–1.27)
EGFRCR SERPLBLD CKD-EPI 2021: 62 ML/MIN/1.73
GLOBULIN SER CALC-MCNC: 2.4 G/DL (ref 1.5–4.5)
GLUCOSE SERPL-MCNC: 94 MG/DL (ref 70–99)
HDLC SERPL-MCNC: 44 MG/DL
LDLC SERPL CALC-MCNC: 52 MG/DL (ref 0–99)
MAGNESIUM SERPL-MCNC: 1.9 MG/DL (ref 1.6–2.3)
POTASSIUM SERPL-SCNC: 4.1 MMOL/L (ref 3.5–5.2)
PROT SERPL-MCNC: 7.4 G/DL (ref 6–8.5)
PSA SERPL-MCNC: 0.3 NG/ML (ref 0–4)
SODIUM SERPL-SCNC: 140 MMOL/L (ref 134–144)
TRIGL SERPL-MCNC: 295 MG/DL (ref 0–149)
TSH SERPL DL<=0.005 MIU/L-ACNC: 3.68 UIU/ML (ref 0.45–4.5)
VLDLC SERPL CALC-MCNC: 45 MG/DL (ref 5–40)

## 2023-12-07 RX ORDER — LEVOTHYROXINE SODIUM 88 UG/1
TABLET ORAL
Qty: 90 TABLET | Refills: 0 | Status: SHIPPED | OUTPATIENT
Start: 2023-12-07

## 2023-12-09 PROBLEM — Z13.31 DEPRESSION SCREEN: Status: ACTIVE | Noted: 2023-12-09

## 2023-12-11 NOTE — PROGRESS NOTES
This is Subjective   Lavelle Mitchell is a 68 y.o. male here for his annual physical with me. Lavelle is here for coordination of medical care, to discuss health maintenance, disease prevention as well as to followup on medical problems. Patient has been followed by me since 2007. Patient's last CPE was 11-28-22. Activity level is moderate. Exercises 4 per week. Appetite is good. Feels well with many complaints. Energy level is good. Sleeps well. Patient's last colonoscopy was 2017 with Dr. Portillo. He was advised to repeat in 2020, pt. Declines repeat colonoscopy.   Patient is doing routine self skin exam twice a year. Patient is doing routine self-breast exams twice a year. Patient is checking testicles twice a year.    Lab Results   Component Value Date    PSA 0.3 12/06/2023        Hyperlipidemia  This is a chronic problem. The current episode started more than 1 year ago. The problem is controlled. Recent lipid tests were reviewed and are high. Exacerbating diseases include hypothyroidism. Factors aggravating his hyperlipidemia include fatty foods. Pertinent negatives include no chest pain, myalgias or shortness of breath. Current antihyperlipidemic treatment includes statins. The current treatment provides significant improvement of lipids.   Hypertension  This is a chronic problem. The current episode started more than 1 year ago. The problem is unchanged. The problem is controlled. Pertinent negatives include no blurred vision, chest pain, neck pain, palpitations or shortness of breath.   Hypothyroidism  This is a chronic problem. The current episode started more than 1 year ago. The problem occurs constantly. The problem has been unchanged. Pertinent negatives include no abdominal pain, chest pain, chills, congestion, coughing, fatigue, fever, joint swelling, myalgias, nausea, neck pain, rash, sore throat, vomiting or weakness.   Heartburn  He reports no abdominal pain, no chest pain, no coughing, no nausea,  no sore throat or no wheezing. This is a chronic problem. The current episode started more than 1 year ago. The problem occurs occasionally. The problem has been unchanged. Pertinent negatives include no fatigue or weight loss. He has tried a PPI for the symptoms. The treatment provided significant relief.   Benign Prostatic Hypertrophy  This is a chronic problem. The current episode started more than 1 year ago. The problem is unchanged. Irritative symptoms do not include frequency or urgency. Pertinent negatives include no chills, dysuria, hematuria, nausea or vomiting.        The following portions of the patient's history were reviewed and updated as appropriate: allergies, current medications, past family history, past medical history, past social history, past surgical history, and problem list.    Past Medical History:   Diagnosis Date    Hyperlipidemia     Hypertension     Hypothyroidism        Family History   Problem Relation Age of Onset    Heart disease Mother     Hyperlipidemia Mother     Diabetes Mother     Cancer Mother         stomach    Vision loss Mother         mother hole in her eye    Heart disease Father     Stroke Father     Hyperlipidemia Father     Other Father         colon polyps    Hypertension Sister     Diabetes Sister     Birth defects Maternal Grandmother     Birth defects Paternal Grandmother        Social History     Socioeconomic History    Marital status:      Spouse name: Juan M    Number of children: 2   Tobacco Use    Smoking status: Former     Types: Pipe     Quit date: 7/1/2008     Years since quitting: 15.5    Smokeless tobacco: Never   Substance and Sexual Activity    Alcohol use: Yes     Comment: 1 drink weekly    Drug use: No    Sexual activity: Defer       Social History     Social History Narrative     3 x .  First wife  for 7.5 years, 1 child with first wife.  2nd wife  5 years, no children with 2nd wife.  3rd wife  1996, no children  together.  Just the 2 of them at home.  Retired 2-2018.   Caffeine 2 cups coffee daily, 2-3 sodas daily.  Exercise gym 3 x weekly 45 minutes weights and [place] or [place] when its not deer season and boweling 1 x weekly.  Always wears seatbelts.  Hobbies hunting and boweling.        Past Surgical History:   Procedure Laterality Date    CHOLECYSTECTOMY      COLON SURGERY      COLONOSCOPY      LYMPH NODE BIOPSY         No current outpatient medications on file prior to visit.     No current facility-administered medications on file prior to visit.       No Known Allergies    Review of Systems   Constitutional:  Negative for appetite change, chills, fatigue, fever, unexpected weight gain and unexpected weight loss.   HENT:  Negative for congestion, dental problem, ear discharge, ear pain, hearing loss, nosebleeds, postnasal drip, rhinorrhea, sinus pressure, sneezing, sore throat, tinnitus and voice change.         Last dental exam approx 2010, he had all his teeth pulled   Eyes:  Negative for blurred vision, double vision, pain, redness and visual disturbance.        Last vision exam 5-2023-Dr. Garcia-Doing well   Respiratory:  Negative for cough, shortness of breath, wheezing and stridor.    Cardiovascular:  Negative for chest pain, palpitations and leg swelling.   Gastrointestinal:  Positive for GERD. Negative for abdominal pain, anal bleeding, blood in stool, constipation, diarrhea, nausea, rectal pain, vomiting and indigestion.        Colostomy   Endocrine: Negative for cold intolerance, heat intolerance, polydipsia, polyphagia and polyuria.        Sex Drive  He is a 0  She is a ?   Genitourinary:  Negative for difficulty urinating, dysuria, frequency, hematuria and urgency.   Musculoskeletal:  Negative for back pain, joint swelling, myalgias, neck pain and neck stiffness.   Skin:  Negative for color change, dry skin and rash.   Neurological:  Negative for dizziness, syncope, speech difficulty, weakness,  "light-headedness, headache and memory problem.   Hematological:  Does not bruise/bleed easily.   Psychiatric/Behavioral:  Negative for decreased concentration, sleep disturbance, depressed mood and stress. The patient is not nervous/anxious.        Objective   Visit Vitals  /72 (BP Location: Left arm, Patient Position: Sitting, Cuff Size: Adult)   Pulse 85   Temp 97.1 °F (36.2 °C) (Temporal)   Resp 18   Ht 172.7 cm (68\")   Wt 72.6 kg (160 lb)   SpO2 98%   BMI 24.33 kg/m²     Physical Exam  Constitutional:       General: He is not in acute distress.     Appearance: He is well-developed. He is not diaphoretic.   HENT:      Head: Normocephalic.      Right Ear: Tympanic membrane, ear canal and external ear normal. Decreased hearing (mild) noted.      Left Ear: Tympanic membrane, ear canal and external ear normal. Decreased hearing (mild) noted.      Nose: Nose normal.      Mouth/Throat:      Lips: Pink.      Mouth: Mucous membranes are moist.      Dentition: Abnormal dentition (no teeth).      Pharynx: Oropharynx is clear. No oropharyngeal exudate.   Eyes:      General: Lids are normal. No scleral icterus.        Right eye: No discharge.         Left eye: No discharge.      Extraocular Movements: Extraocular movements intact.      Conjunctiva/sclera: Conjunctivae normal.      Pupils: Pupils are equal, round, and reactive to light.      Comments: Wears glasses.  Mild cataracts bilateral.   Neck:      Trachea: Trachea normal.   Cardiovascular:      Rate and Rhythm: Normal rate and regular rhythm.      Pulses:           Dorsalis pedis pulses are 0 on the right side and 0 on the left side.        Posterior tibial pulses are 1+ on the right side and 1+ on the left side.      Heart sounds: Normal heart sounds. No murmur heard.  Pulmonary:      Effort: Pulmonary effort is normal.      Breath sounds: Normal breath sounds. No wheezing.   Chest:      Chest wall: No tenderness.   Breasts:     Right: Normal. No swelling, " bleeding, inverted nipple, mass, nipple discharge, skin change or tenderness.      Left: Normal. No swelling, bleeding, inverted nipple, mass, nipple discharge, skin change or tenderness.   Abdominal:      General: Bowel sounds are normal. There is no distension.      Palpations: Abdomen is soft. There is no mass.      Tenderness: There is no abdominal tenderness.      Hernia: A hernia is present. Hernia is present in the left inguinal area (mild).      Comments: Colostomy left lower abdomin   Genitourinary:     Penis: Normal and circumcised. No tenderness.       Testes: Normal.      Comments: No rectal exam due to anus being surgically closed  Musculoskeletal:         General: No tenderness or deformity. Normal range of motion.      Cervical back: Full passive range of motion without pain, normal range of motion and neck supple.   Lymphadenopathy:      Cervical: No cervical adenopathy.   Skin:     General: Skin is warm and dry.      Findings: No erythema or rash.      Comments: Right neck scar.  Onychomycosis moderate of the left 3rd and right Great is severe and Moderate 4th and 5th toes.  LLQ scar epigastric to lower abdomin.  Right vertical scar from epigastric to suprapubic.  Nevus's scattered over the back.   Neurological:      General: No focal deficit present.      Mental Status: He is oriented to person, place, and time. He is lethargic.      Cranial Nerves: Cranial nerves 2-12 are intact. No cranial nerve deficit.      Sensory: Sensation is intact. No sensory deficit.      Motor: Motor function is intact. No abnormal muscle tone.      Coordination: Coordination is intact. Coordination normal.      Gait: Gait is intact.      Deep Tendon Reflexes: Reflexes normal.   Psychiatric:         Behavior: Behavior normal.         Thought Content: Thought content normal.         Judgment: Judgment normal.         Assessment & Plan   Problem List Items Addressed This Visit          Medium    Hyperlipidemia - Primary     Current Assessment & Plan     Lipid and CMP done 12-6-2023, read by me, reviewed with pt.  Trig. 295 down from 415, Tot. Chol. 141 down from 155, HDL 44 up from 42, LDL 52 up from 51  Improved but not at goal due to elevated triglyceride of 295.  Discussed risk for pancreatitis  Encouraged to watch fatty intake, exercise more, and lose weight.   Compliant with medication.  Patient tolerated Lipitor well without side effects. I feel the benefits of the medication outweigh the risks.   Is getting adequate diet and exercise  Goals developed at last visit were met   Follow up in 6 months  Care management needs are self-addressed.  Self-management abilities addressed and patient is capable of managing his own disease.           Relevant Medications    atorvastatin (LIPITOR) 10 MG tablet    Other Relevant Orders    Lipid Panel With / Chol / HDL Ratio    Comprehensive Metabolic Panel    Hypertension    Current Assessment & Plan     Doing well.  No treatment required.  Encouraged to watch salt, exercise more and lose weight.           Hypothyroidism    Current Assessment & Plan     Doing well.  TSH done 12-6-2023, read by me, reviewed with pt.  TSH was 3.680 up from 2.940.  Patient tolerated Synthroid well without side effects. I feel the benefits of the medication outweigh the risks.          Relevant Medications    levothyroxine (SYNTHROID, LEVOTHROID) 88 MCG tablet    Other Relevant Orders    TSH    Malignant neoplasm of colon    Current Assessment & Plan     Doing well.  Pt. Declines repeat colonoscopy            Low    BPH (benign prostatic hyperplasia)    Current Assessment & Plan     Doing well.           Elevated alkaline phosphatase level    Overview     Asymptomatic         Current Assessment & Plan     Worse.  CMP done 12-6-2023, read by me, reviewed with pt.  Alk phos. Was 139 up from 127.  Will repeat with next labs.          Gastroesophageal reflux disease    Current Assessment & Plan     Doing well.  Advised  to try to skip a day of Omeprazole weekly.  Patient tolerated Omeprazole well without side effects. I feel the benefits of the medication outweigh the risks.          Relevant Medications    omeprazole (priLOSEC) 20 MG capsule    Hypokalemia    Current Assessment & Plan     Doing well.  CMP done 12-6-2023, read by me, reviewed with pt.  Potassium was normal x 3.  Patient tolerated Potassium well without side effects. I feel the benefits of the medication outweigh the risks.          Relevant Medications    potassium chloride (K-DUR,KLOR-CON) 20 MEQ CR tablet    Hypomagnesemia    Current Assessment & Plan     Doing well.  Magnesium done 12-6-2023, read by me, reviewed with pt.  Magnesium was 1.9 up from 1.7.  Patient tolerated Magnesium well without side effects. I feel the benefits of the medication outweigh the risks.          Relevant Medications    magnesium chloride ER (MagDelay) 64 MG DR tablet    Other Relevant Orders    Magnesium    Right bundle branch block    Overview     Last EKG was done December 6, 2023 shows moderate intraventricular conduction delay.         Current Assessment & Plan     Doing well            Unprioritized    Encounter for general adult medical examination with abnormal findings    Overview                Current Assessment & Plan     Encouraged to do self-breast exam, self-testicle exams, and self derm exams. Congratulated on using seat belts.  Encouraged to do annual physical exams.  Immunization status reviewed.           Family history of diabetes mellitus    Current Assessment & Plan     Advised yrly glucose and lower risk factors         Family history of stroke    Current Assessment & Plan     Advised to lower risk factors         Hearing loss    Current Assessment & Plan     Stable.  Advised to wear hearing protection and avoid noise exposure         Hematuria    Current Assessment & Plan     Advised repeat U/A.  Will repeat with next labs         Relevant Orders    Urinalysis  without microscopic (no culture) - Urine, Clean Catch    History of cancer tonsil    Current Assessment & Plan     Doing well.           Immunization counseling    Overview     UTD as of 12-6-23  Covid  3-25-21 and 2-25-21  Pneu 20 12-6-23,   Flu 12-6-23  Pneu 13  11-13-17  Shingrix 2-9-21 and 10-21-20  T-dap 10-           Current Assessment & Plan     UTD as of 12-6-23         Other proteinuria    Current Assessment & Plan     Advised repeat U/A.  Will repeat with next labs         Relevant Orders    Urinalysis without microscopic (no culture) - Urine, Clean Catch    Screening PSA (prostate specific antigen)    Current Assessment & Plan     Doing well.  PSA done 12-6-2023, read by me, reviewed with pt.  PSA was 0.3 up from 0.2         Unilateral inguinal hernia without obstruction or gangrene    Current Assessment & Plan     Small and will follow conservatively          Other Visit Diagnoses       Erectile dysfunction due to diseases classified elsewhere        Resolved, thus delete    Elevated liver function tests        Resolved x 3.                 Encouraged to check his skin, testicles and breasts monthly. Reviewed exercising regularly, eating a balanced diet, immunizations and if due, patient counselled to check with insurance company for coverage;, and regularly checking skin and breasts.

## 2023-12-12 ENCOUNTER — OFFICE VISIT (OUTPATIENT)
Dept: FAMILY MEDICINE CLINIC | Facility: CLINIC | Age: 68
End: 2023-12-12
Payer: MEDICARE

## 2023-12-12 VITALS
DIASTOLIC BLOOD PRESSURE: 72 MMHG | TEMPERATURE: 97.1 F | HEART RATE: 85 BPM | OXYGEN SATURATION: 98 % | SYSTOLIC BLOOD PRESSURE: 114 MMHG | WEIGHT: 160 LBS | BODY MASS INDEX: 24.25 KG/M2 | HEIGHT: 68 IN | RESPIRATION RATE: 18 BRPM

## 2023-12-12 DIAGNOSIS — Z71.85 IMMUNIZATION COUNSELING: ICD-10-CM

## 2023-12-12 DIAGNOSIS — R80.8 OTHER PROTEINURIA: ICD-10-CM

## 2023-12-12 DIAGNOSIS — K21.9 GASTROESOPHAGEAL REFLUX DISEASE: ICD-10-CM

## 2023-12-12 DIAGNOSIS — Z83.3 FAMILY HISTORY OF DIABETES MELLITUS: ICD-10-CM

## 2023-12-12 DIAGNOSIS — I45.10 RIGHT BUNDLE BRANCH BLOCK: ICD-10-CM

## 2023-12-12 DIAGNOSIS — Z12.5 SCREENING PSA (PROSTATE SPECIFIC ANTIGEN): ICD-10-CM

## 2023-12-12 DIAGNOSIS — K21.9 GASTROESOPHAGEAL REFLUX DISEASE WITHOUT ESOPHAGITIS: ICD-10-CM

## 2023-12-12 DIAGNOSIS — R31.9 HEMATURIA, UNSPECIFIED TYPE: ICD-10-CM

## 2023-12-12 DIAGNOSIS — E83.42 HYPOMAGNESEMIA: ICD-10-CM

## 2023-12-12 DIAGNOSIS — N52.1 ERECTILE DYSFUNCTION DUE TO DISEASES CLASSIFIED ELSEWHERE: ICD-10-CM

## 2023-12-12 DIAGNOSIS — R74.8 ELEVATED ALKALINE PHOSPHATASE LEVEL: ICD-10-CM

## 2023-12-12 DIAGNOSIS — E78.2 MIXED HYPERLIPIDEMIA: Primary | ICD-10-CM

## 2023-12-12 DIAGNOSIS — K40.90 UNILATERAL INGUINAL HERNIA WITHOUT OBSTRUCTION OR GANGRENE, RECURRENCE NOT SPECIFIED: ICD-10-CM

## 2023-12-12 DIAGNOSIS — R79.89 ELEVATED LIVER FUNCTION TESTS: ICD-10-CM

## 2023-12-12 DIAGNOSIS — Z82.3 FAMILY HISTORY OF STROKE: ICD-10-CM

## 2023-12-12 DIAGNOSIS — E03.8 OTHER SPECIFIED HYPOTHYROIDISM: ICD-10-CM

## 2023-12-12 DIAGNOSIS — E87.6 HYPOKALEMIA: ICD-10-CM

## 2023-12-12 DIAGNOSIS — I10 PRIMARY HYPERTENSION: ICD-10-CM

## 2023-12-12 DIAGNOSIS — Z00.01 ENCOUNTER FOR GENERAL ADULT MEDICAL EXAMINATION WITH ABNORMAL FINDINGS: ICD-10-CM

## 2023-12-12 DIAGNOSIS — C18.9 MALIGNANT NEOPLASM OF COLON, UNSPECIFIED PART OF COLON: ICD-10-CM

## 2023-12-12 DIAGNOSIS — H90.2 CONDUCTIVE HEARING LOSS, UNSPECIFIED LATERALITY: ICD-10-CM

## 2023-12-12 DIAGNOSIS — N40.0 BENIGN PROSTATIC HYPERPLASIA, UNSPECIFIED WHETHER LOWER URINARY TRACT SYMPTOMS PRESENT: ICD-10-CM

## 2023-12-12 DIAGNOSIS — Z85.818 HISTORY OF CANCER TONSIL: ICD-10-CM

## 2023-12-12 RX ORDER — LEVOTHYROXINE SODIUM 88 UG/1
88 TABLET ORAL DAILY
Start: 2023-12-12

## 2023-12-12 RX ORDER — ATORVASTATIN CALCIUM 10 MG/1
10 TABLET, FILM COATED ORAL DAILY
Start: 2023-12-12

## 2023-12-12 RX ORDER — POTASSIUM CHLORIDE 20 MEQ/1
20 TABLET, EXTENDED RELEASE ORAL 4 TIMES DAILY
Start: 2023-12-12

## 2023-12-12 RX ORDER — OMEPRAZOLE 20 MG/1
20 CAPSULE, DELAYED RELEASE ORAL DAILY
Start: 2023-12-12

## 2023-12-12 RX ORDER — LANOLIN ALCOHOL/MO/W.PET/CERES
CREAM (GRAM) TOPICAL
Start: 2023-12-12

## 2023-12-12 NOTE — ASSESSMENT & PLAN NOTE
Doing well.  CMP done 12-6-2023, read by me, reviewed with pt.  Potassium was normal x 3.  Patient tolerated Potassium well without side effects. I feel the benefits of the medication outweigh the risks.

## 2023-12-12 NOTE — ASSESSMENT & PLAN NOTE
Doing well.  TSH done 12-6-2023, read by me, reviewed with pt.  TSH was 3.680 up from 2.940.  Patient tolerated Synthroid well without side effects. I feel the benefits of the medication outweigh the risks.

## 2023-12-12 NOTE — ASSESSMENT & PLAN NOTE
Doing well.  Magnesium done 12-6-2023, read by me, reviewed with pt.  Magnesium was 1.9 up from 1.7.  Patient tolerated Magnesium well without side effects. I feel the benefits of the medication outweigh the risks.

## 2023-12-12 NOTE — ASSESSMENT & PLAN NOTE
Worse.  CMP done 12-6-2023, read by me, reviewed with pt.  Alk phos. Was 139 up from 127.  Will repeat with next labs.

## 2023-12-12 NOTE — ASSESSMENT & PLAN NOTE
Lipid and CMP done 12-6-2023, read by me, reviewed with pt.  Trig. 295 down from 415, Tot. Chol. 141 down from 155, HDL 44 up from 42, LDL 52 up from 51  Improved but not at goal due to elevated triglyceride of 295.  Discussed risk for pancreatitis  Encouraged to watch fatty intake, exercise more, and lose weight.   Compliant with medication.  Patient tolerated Lipitor well without side effects. I feel the benefits of the medication outweigh the risks.   Is getting adequate diet and exercise  Goals developed at last visit were met   Follow up in 6 months  Care management needs are self-addressed.  Self-management abilities addressed and patient is capable of managing his own disease.

## 2023-12-12 NOTE — ASSESSMENT & PLAN NOTE
Doing well.  Advised to try to skip a day of Omeprazole weekly.  Patient tolerated Omeprazole well without side effects. I feel the benefits of the medication outweigh the risks.

## 2024-02-10 DIAGNOSIS — K21.9 GASTROESOPHAGEAL REFLUX DISEASE: ICD-10-CM

## 2024-02-10 DIAGNOSIS — E78.2 MIXED HYPERLIPIDEMIA: ICD-10-CM

## 2024-02-12 RX ORDER — OMEPRAZOLE 20 MG/1
20 CAPSULE, DELAYED RELEASE ORAL DAILY
Qty: 90 CAPSULE | Refills: 1 | Status: SHIPPED | OUTPATIENT
Start: 2024-02-12

## 2024-02-12 RX ORDER — ATORVASTATIN CALCIUM 10 MG/1
10 TABLET, FILM COATED ORAL DAILY
Qty: 90 TABLET | Refills: 1 | Status: SHIPPED | OUTPATIENT
Start: 2024-02-12

## 2024-03-03 DIAGNOSIS — E03.8 OTHER SPECIFIED HYPOTHYROIDISM: ICD-10-CM

## 2024-03-04 RX ORDER — LEVOTHYROXINE SODIUM 88 UG/1
88 TABLET ORAL DAILY
Qty: 90 TABLET | Refills: 0 | Status: SHIPPED | OUTPATIENT
Start: 2024-03-04

## 2024-05-29 DIAGNOSIS — E03.8 OTHER SPECIFIED HYPOTHYROIDISM: ICD-10-CM

## 2024-05-29 RX ORDER — LEVOTHYROXINE SODIUM 88 UG/1
88 TABLET ORAL DAILY
Qty: 90 TABLET | Refills: 0 | Status: SHIPPED | OUTPATIENT
Start: 2024-05-29

## 2024-06-06 LAB
ALBUMIN SERPL-MCNC: 4.9 G/DL (ref 3.9–4.9)
ALBUMIN/GLOB SERPL: 2.2 {RATIO} (ref 1.2–2.2)
ALP SERPL-CCNC: 143 IU/L (ref 44–121)
ALT SERPL-CCNC: 36 IU/L (ref 0–44)
APPEARANCE UR: CLEAR
AST SERPL-CCNC: 26 IU/L (ref 0–40)
BILIRUB SERPL-MCNC: 0.9 MG/DL (ref 0–1.2)
BILIRUB UR QL STRIP: NEGATIVE
BUN SERPL-MCNC: 14 MG/DL (ref 8–27)
BUN/CREAT SERPL: 11 (ref 10–24)
CALCIUM SERPL-MCNC: 10.2 MG/DL (ref 8.6–10.2)
CHLORIDE SERPL-SCNC: 102 MMOL/L (ref 96–106)
CHOLEST SERPL-MCNC: 165 MG/DL (ref 100–199)
CHOLEST/HDLC SERPL: 3.7 RATIO (ref 0–5)
CO2 SERPL-SCNC: 24 MMOL/L (ref 20–29)
COLOR UR: YELLOW
CREAT SERPL-MCNC: 1.26 MG/DL (ref 0.76–1.27)
EGFRCR SERPLBLD CKD-EPI 2021: 63 ML/MIN/1.73
GLOBULIN SER CALC-MCNC: 2.2 G/DL (ref 1.5–4.5)
GLUCOSE SERPL-MCNC: 87 MG/DL (ref 70–99)
GLUCOSE UR QL STRIP: NEGATIVE
HDLC SERPL-MCNC: 45 MG/DL
HGB UR QL STRIP: NEGATIVE
KETONES UR QL STRIP: NEGATIVE
LDLC SERPL CALC-MCNC: 53 MG/DL (ref 0–99)
LEUKOCYTE ESTERASE UR QL STRIP: NEGATIVE
MAGNESIUM SERPL-MCNC: 1.8 MG/DL (ref 1.6–2.3)
NITRITE UR QL STRIP: NEGATIVE
PH UR STRIP: 5.5 [PH] (ref 5–7.5)
POTASSIUM SERPL-SCNC: 4.3 MMOL/L (ref 3.5–5.2)
PROT SERPL-MCNC: 7.1 G/DL (ref 6–8.5)
PROT UR QL STRIP: NORMAL
SODIUM SERPL-SCNC: 141 MMOL/L (ref 134–144)
SP GR UR STRIP: 1.02 (ref 1–1.03)
TRIGL SERPL-MCNC: 449 MG/DL (ref 0–149)
TSH SERPL DL<=0.005 MIU/L-ACNC: 4.68 UIU/ML (ref 0.45–4.5)
UROBILINOGEN UR STRIP-MCNC: 0.2 MG/DL (ref 0.2–1)
VLDLC SERPL CALC-MCNC: 67 MG/DL (ref 5–40)

## 2024-06-12 ENCOUNTER — OFFICE VISIT (OUTPATIENT)
Dept: FAMILY MEDICINE CLINIC | Facility: CLINIC | Age: 69
End: 2024-06-12
Payer: MEDICARE

## 2024-06-12 VITALS
TEMPERATURE: 97.3 F | HEART RATE: 83 BPM | OXYGEN SATURATION: 97 % | BODY MASS INDEX: 24.67 KG/M2 | SYSTOLIC BLOOD PRESSURE: 126 MMHG | WEIGHT: 162.8 LBS | DIASTOLIC BLOOD PRESSURE: 74 MMHG | RESPIRATION RATE: 18 BRPM | HEIGHT: 68 IN

## 2024-06-12 DIAGNOSIS — E87.6 HYPOKALEMIA: ICD-10-CM

## 2024-06-12 DIAGNOSIS — R80.8 OTHER PROTEINURIA: ICD-10-CM

## 2024-06-12 DIAGNOSIS — R31.9 HEMATURIA, UNSPECIFIED TYPE: ICD-10-CM

## 2024-06-12 DIAGNOSIS — E03.8 OTHER SPECIFIED HYPOTHYROIDISM: ICD-10-CM

## 2024-06-12 DIAGNOSIS — I10 PRIMARY HYPERTENSION: Primary | ICD-10-CM

## 2024-06-12 DIAGNOSIS — E83.42 HYPOMAGNESEMIA: ICD-10-CM

## 2024-06-12 DIAGNOSIS — K21.9 GASTROESOPHAGEAL REFLUX DISEASE: ICD-10-CM

## 2024-06-12 DIAGNOSIS — E78.2 MIXED HYPERLIPIDEMIA: ICD-10-CM

## 2024-06-12 DIAGNOSIS — R74.8 ELEVATED ALKALINE PHOSPHATASE LEVEL: ICD-10-CM

## 2024-06-12 PROCEDURE — 1126F AMNT PAIN NOTED NONE PRSNT: CPT | Performed by: FAMILY MEDICINE

## 2024-06-12 PROCEDURE — G2211 COMPLEX E/M VISIT ADD ON: HCPCS | Performed by: FAMILY MEDICINE

## 2024-06-12 PROCEDURE — 1160F RVW MEDS BY RX/DR IN RCRD: CPT | Performed by: FAMILY MEDICINE

## 2024-06-12 PROCEDURE — 99214 OFFICE O/P EST MOD 30 MIN: CPT | Performed by: FAMILY MEDICINE

## 2024-06-12 PROCEDURE — 3074F SYST BP LT 130 MM HG: CPT | Performed by: FAMILY MEDICINE

## 2024-06-12 PROCEDURE — 1159F MED LIST DOCD IN RCRD: CPT | Performed by: FAMILY MEDICINE

## 2024-06-12 PROCEDURE — 3078F DIAST BP <80 MM HG: CPT | Performed by: FAMILY MEDICINE

## 2024-06-12 RX ORDER — LEVOTHYROXINE SODIUM 88 UG/1
88 TABLET ORAL DAILY
Start: 2024-06-12

## 2024-06-12 RX ORDER — OMEPRAZOLE 20 MG/1
20 CAPSULE, DELAYED RELEASE ORAL DAILY
Qty: 90 CAPSULE | Refills: 1 | Status: SHIPPED | OUTPATIENT
Start: 2024-06-12

## 2024-06-12 RX ORDER — LANOLIN ALCOHOL/MO/W.PET/CERES
CREAM (GRAM) TOPICAL
Start: 2024-06-12

## 2024-06-12 RX ORDER — ATORVASTATIN CALCIUM 10 MG/1
10 TABLET, FILM COATED ORAL DAILY
Qty: 90 TABLET | Refills: 1 | Status: SHIPPED | OUTPATIENT
Start: 2024-06-12

## 2024-06-12 RX ORDER — POTASSIUM CHLORIDE 20 MEQ/1
20 TABLET, EXTENDED RELEASE ORAL 4 TIMES DAILY
Start: 2024-06-12

## 2024-06-12 NOTE — ASSESSMENT & PLAN NOTE
Worse.CMP done 6-5-2024, read by me,reviewed with pt.  Alk. Phos was 143 up from 139.  Will order fractionated Alk.Phos with next labs.

## 2024-06-12 NOTE — ASSESSMENT & PLAN NOTE
Doing well.  Patient tolerated Omeprazole well without side effects. I feel the benefits of the medication outweigh the risks.

## 2024-06-12 NOTE — ASSESSMENT & PLAN NOTE
Resolved x 3, thus delete.  U/A done 6-5-2024, read by me,reviewed with pt.  U/A showed resolved x 3

## 2024-06-12 NOTE — PROGRESS NOTES
Subjective   Lavelle Mitchell is a 69 y.o. male.     Hypertension  This is a chronic problem. The current episode started more than 1 year ago. The problem is unchanged. The problem is controlled. Pertinent negatives include no chest pain, palpitations or shortness of breath.   Hyperlipidemia  This is a chronic problem. The current episode started more than 1 year ago. The problem is controlled. Recent lipid tests were reviewed and are high. Exacerbating diseases include hypothyroidism. Factors aggravating his hyperlipidemia include fatty foods. Pertinent negatives include no chest pain, myalgias or shortness of breath. Current antihyperlipidemic treatment includes statins. The current treatment provides no improvement of lipids. There are no compliance problems.  Risk factors for coronary artery disease include dyslipidemia and hypertension.   Hypothyroidism  This is a chronic problem. The current episode started more than 1 year ago. The problem occurs constantly. The problem has been gradually worsening. Pertinent negatives include no chest pain, fatigue, myalgias or nausea.   Abnormal Lab  This is a chronic problem. The current episode started more than 1 year ago. The problem occurs constantly. Pertinent negatives include no chest pain, fatigue, myalgias or nausea.        The following portions of the patient's history were reviewed and updated as appropriate: allergies, current medications, past family history, past medical history, past social history, past surgical history, and problem list.    Family History   Problem Relation Age of Onset    Heart disease Mother     Hyperlipidemia Mother     Diabetes Mother     Cancer Mother         stomach    Vision loss Mother         mother hole in her eye    Heart disease Father     Stroke Father     Hyperlipidemia Father     Other Father         colon polyps    Hypertension Sister     Diabetes Sister     Birth defects Maternal Grandmother     Birth defects Paternal  Grandmother        Social History     Tobacco Use    Smoking status: Former     Types: Pipe     Quit date: 7/1/2008     Years since quitting: 15.9    Smokeless tobacco: Never   Substance Use Topics    Alcohol use: Yes     Comment: 1 drink weekly    Drug use: No       Past Surgical History:   Procedure Laterality Date    CHOLECYSTECTOMY      COLON SURGERY      COLONOSCOPY      LYMPH NODE BIOPSY         Patient Active Problem List   Diagnosis    Hyperlipidemia    Hypertension    Hypothyroidism    Elevated alkaline phosphatase level    Hypomagnesemia    Malignant neoplasm of colon    Right bundle branch block    Hearing loss    Screening PSA (prostate specific antigen)    History of cancer tonsil    Unilateral inguinal hernia without obstruction or gangrene    Hypokalemia    Gastroesophageal reflux disease    BPH (benign prostatic hyperplasia)    Hematuria    Encounter for general adult medical examination with abnormal findings    Family history of stroke    Family history of diabetes mellitus    Welcome to Medicare preventive visit    Need for immunization against influenza    Other proteinuria    Medicare annual wellness visit, subsequent    Advanced directive placed in chart this admission    Immunization counseling    Depression screen       No current outpatient medications on file prior to visit.     No current facility-administered medications on file prior to visit.       No Known Allergies    Review of Systems   Constitutional:  Negative for appetite change, fatigue, unexpected weight gain and unexpected weight loss.   Respiratory:  Negative for shortness of breath.    Cardiovascular:  Negative for chest pain, palpitations and leg swelling.   Gastrointestinal:  Negative for nausea.   Endocrine: Negative for cold intolerance and heat intolerance.   Musculoskeletal:  Negative for myalgias.   Skin:  Negative for dry skin.   Neurological:  Negative for headache.       Objective   Visit Vitals  /74 (BP  "Location: Left arm, Patient Position: Sitting, Cuff Size: Adult)   Pulse 83   Temp 97.3 °F (36.3 °C) (Temporal)   Resp 18   Ht 172.7 cm (68\")   Wt 73.8 kg (162 lb 12.8 oz)   SpO2 97%   BMI 24.75 kg/m²     Physical Exam  Vitals and nursing note reviewed.   Constitutional:       Appearance: He is well-developed.   HENT:      Head: Normocephalic.   Neck:      Thyroid: No thyromegaly.      Vascular: No carotid bruit.      Trachea: Trachea normal.   Cardiovascular:      Rate and Rhythm: Normal rate and regular rhythm.      Heart sounds: No murmur heard.     No friction rub. No gallop.   Pulmonary:      Effort: Pulmonary effort is normal. No respiratory distress.      Breath sounds: Normal breath sounds. No wheezing.   Chest:      Chest wall: No tenderness.   Musculoskeletal:      Cervical back: Neck supple.   Skin:     General: Skin is dry.      Findings: No rash.      Nails: There is no clubbing.   Neurological:      Mental Status: He is alert and oriented to person, place, and time.   Psychiatric:         Behavior: Behavior is cooperative.           Assessment & Plan .  Problem List Items Addressed This Visit          Medium    Hyperlipidemia    Current Assessment & Plan      Lipid and CMP done 6-5-2024, read by me,reviewed with pt.  Trig. 449 up from 295, Tot. Chol. 165 up from 141, HDL 45 up from 44, LDL 53 up from 52  Worsening.   Encouraged to watch fatty intake, exercise more, and lose weight.   Compliant with medication.  Patient tolerated Lipitor well without side effects. I feel the benefits of the medication outweigh the risks.    Is not getting adequate diet and exercise  Goals developed at last visit were not met   Follow up in 3  months  Care management needs are self-addressed. . Self-management abilities addressed and patient is capable of managing his own disease.           Relevant Medications    atorvastatin (LIPITOR) 10 MG tablet    Other Relevant Orders    Lipid Panel With / Chol / HDL Ratio    " Comprehensive Metabolic Panel    Hypertension - Primary    Overview     No medication other than potassium         Current Assessment & Plan     Doing well.  Patient tolerated Potassium well without side effects. I feel the benefits of the medication outweigh the risks.   Encouraged to watch salt, exercise more and lose weight.           Hypothyroidism    Current Assessment & Plan     Worse.  TSH done 6-5-2024, read by me,reviewed with pt. TSH was 4.680 up from 3.680.  Patient tolerated Synthroid well without side effects. I feel the benefits of the medication outweigh the risks.  We discussed increasing medication but will do no change at this time since he is asymptomatic         Relevant Medications    levothyroxine (SYNTHROID, LEVOTHROID) 88 MCG tablet    Other Relevant Orders    TSH       Low    Elevated alkaline phosphatase level    Overview     Asymptomatic         Current Assessment & Plan     Worse.CMP done 6-5-2024, read by me,reviewed with pt.  Alk. Phos was 143 up from 139.  Will order fractionated Alk.Phos with next labs.         Relevant Orders    Alkaline Phosphatase, Isoenzymes    Gastroesophageal reflux disease    Current Assessment & Plan     Doing well.  Patient tolerated Omeprazole well without side effects. I feel the benefits of the medication outweigh the risks.          Relevant Medications    omeprazole (priLOSEC) 20 MG capsule    Hypokalemia    Current Assessment & Plan     Doing well.  CMP done 6-5-2024, read by me,reviewed with pt. Potassium was 4.3 up from 4.1. Patient tolerated Potassium well without side effects. I feel the benefits of the medication outweigh the risks.          Relevant Medications    potassium chloride (KLOR-CON M20) 20 MEQ CR tablet    Hypomagnesemia    Current Assessment & Plan     Doing well.  Magnesium done 6-5-2024, read by me,reviewed with pt. Magnesium was 1.8 down from 1.9. Patient tolerated Magnesium well without side effects. I feel the benefits of the  medication outweigh the risks.          Relevant Medications    magnesium chloride ER (MagDelay) 64 MG DR tablet    Other Relevant Orders    Magnesium       Unprioritized    Hematuria    Current Assessment & Plan     Resolved x 3, thus delete.  U/A done 6-5-2024, read by me,reviewed with pt.  U/A showed resolved x 3         Other proteinuria    Current Assessment & Plan     Improved.  U/A done 6-5-2024, read by me,reviewed with pt.  U/A showed trace Protein down from 2+  Will repeat U/A with next labs.         Relevant Orders    Urinalysis without microscopic (no culture) - Urine, Clean Catch

## 2024-06-12 NOTE — ASSESSMENT & PLAN NOTE
Doing well.  Patient tolerated Potassium well without side effects. I feel the benefits of the medication outweigh the risks.   Encouraged to watch salt, exercise more and lose weight.

## 2024-06-12 NOTE — ASSESSMENT & PLAN NOTE
Doing well.  Magnesium done 6-5-2024, read by me,reviewed with pt. Magnesium was 1.8 down from 1.9. Patient tolerated Magnesium well without side effects. I feel the benefits of the medication outweigh the risks.

## 2024-06-12 NOTE — ASSESSMENT & PLAN NOTE
Lipid and CMP done 6-5-2024, read by me,reviewed with pt.  Trig. 449 up from 295, Tot. Chol. 165 up from 141, HDL 45 up from 44, LDL 53 up from 52  Worsening.   Encouraged to watch fatty intake, exercise more, and lose weight.   Compliant with medication.  Patient tolerated Lipitor well without side effects. I feel the benefits of the medication outweigh the risks.    Is not getting adequate diet and exercise  Goals developed at last visit were not met   Follow up in 3  months  Care management needs are self-addressed. . Self-management abilities addressed and patient is capable of managing his own disease.

## 2024-06-12 NOTE — ASSESSMENT & PLAN NOTE
Improved.  U/A done 6-5-2024, read by me,reviewed with pt.  U/A showed trace Protein down from 2+  Will repeat U/A with next labs.

## 2024-06-12 NOTE — ASSESSMENT & PLAN NOTE
Doing well.  CMP done 6-5-2024, read by me,reviewed with pt. Potassium was 4.3 up from 4.1. Patient tolerated Potassium well without side effects. I feel the benefits of the medication outweigh the risks.

## 2024-06-12 NOTE — ASSESSMENT & PLAN NOTE
Worse.  TSH done 6-5-2024, read by me,reviewed with pt. TSH was 4.680 up from 3.680.  Patient tolerated Synthroid well without side effects. I feel the benefits of the medication outweigh the risks.  We discussed increasing medication but will do no change at this time since he is asymptomatic

## 2024-08-25 DIAGNOSIS — E03.8 OTHER SPECIFIED HYPOTHYROIDISM: ICD-10-CM

## 2024-08-26 RX ORDER — LEVOTHYROXINE SODIUM 88 UG/1
88 TABLET ORAL DAILY
Qty: 90 TABLET | Refills: 1 | Status: SHIPPED | OUTPATIENT
Start: 2024-08-26

## 2024-09-06 LAB
ALBUMIN SERPL-MCNC: 4.5 G/DL (ref 3.9–4.9)
ALP BONE CFR SERPL: 34 % (ref 12–68)
ALP INTEST CFR SERPL: 2 % (ref 0–18)
ALP LIVER CFR SERPL: 63 % (ref 13–88)
ALP SERPL-CCNC: 127 IU/L (ref 44–121)
ALT SERPL-CCNC: 34 IU/L (ref 0–44)
APPEARANCE UR: CLEAR
AST SERPL-CCNC: 22 IU/L (ref 0–40)
BILIRUB SERPL-MCNC: 0.9 MG/DL (ref 0–1.2)
BILIRUB UR QL STRIP: NEGATIVE
BUN SERPL-MCNC: 14 MG/DL (ref 8–27)
BUN/CREAT SERPL: 12 (ref 10–24)
CALCIUM SERPL-MCNC: 9.9 MG/DL (ref 8.6–10.2)
CHLORIDE SERPL-SCNC: 103 MMOL/L (ref 96–106)
CHOLEST SERPL-MCNC: 138 MG/DL (ref 100–199)
CHOLEST/HDLC SERPL: 3.1 RATIO (ref 0–5)
CO2 SERPL-SCNC: 22 MMOL/L (ref 20–29)
COLOR UR: YELLOW
CREAT SERPL-MCNC: 1.2 MG/DL (ref 0.76–1.27)
EGFRCR SERPLBLD CKD-EPI 2021: 66 ML/MIN/1.73
GLOBULIN SER CALC-MCNC: 2.5 G/DL (ref 1.5–4.5)
GLUCOSE SERPL-MCNC: 94 MG/DL (ref 70–99)
GLUCOSE UR QL STRIP: NEGATIVE
HDLC SERPL-MCNC: 45 MG/DL
HGB UR QL STRIP: NEGATIVE
KETONES UR QL STRIP: NEGATIVE
LDLC SERPL CALC-MCNC: 44 MG/DL (ref 0–99)
LEUKOCYTE ESTERASE UR QL STRIP: NEGATIVE
MAGNESIUM SERPL-MCNC: 1.5 MG/DL (ref 1.6–2.3)
NITRITE UR QL STRIP: NEGATIVE
PH UR STRIP: 6 [PH] (ref 5–7.5)
POTASSIUM SERPL-SCNC: 3.9 MMOL/L (ref 3.5–5.2)
PROT SERPL-MCNC: 7 G/DL (ref 6–8.5)
PROT UR QL STRIP: ABNORMAL
SODIUM SERPL-SCNC: 140 MMOL/L (ref 134–144)
SP GR UR STRIP: >=1.03 (ref 1–1.03)
TRIGL SERPL-MCNC: 328 MG/DL (ref 0–149)
TSH SERPL DL<=0.005 MIU/L-ACNC: 1.23 UIU/ML (ref 0.45–4.5)
UROBILINOGEN UR STRIP-MCNC: 0.2 MG/DL (ref 0.2–1)
VLDLC SERPL CALC-MCNC: 49 MG/DL (ref 5–40)

## 2024-09-11 NOTE — ASSESSMENT & PLAN NOTE
Doing well--TSH done 9-5-24, read by me, reviewed with pt.  TSH was 1.230 down from 4.680.  Patient tolerated Synthroid well without side effects. I feel the benefits of the medication outweigh the risks.

## 2024-09-11 NOTE — ASSESSMENT & PLAN NOTE
Worse due to noncompliance forgetting meds pt. Has promised to take magnesium as prescribed.  Magnesium done 9-5-24, read by me, reviewed with pt.  Magnesium was 1.5 down from 1.8.  Patient tolerated Magnesium well without side effects. I feel the benefits of the medication outweigh the risks.

## 2024-09-11 NOTE — ASSESSMENT & PLAN NOTE
Lipid and CMP done 9-5-24, read by me, reviewed with pt.  Trig. 328 down from 449, Toty. Chol. 138 down from 165, HDL 45 same as last, LDL 44 down from 53  Improved.   Encouraged to watch fatty intake, exercise more, and lose weight.  Compliant with medication.  Patient tolerated Lipitor well without side effects. I feel the benefits of the medication outweigh the risks.    Is not getting adequate diet and exercise  Goals developed at last visit were not met close to goal.  Follow up in  3 months  Care management needs are self-addressed.  Self-management abilities addressed and patient is capable of managing his own disease.

## 2024-09-11 NOTE — ASSESSMENT & PLAN NOTE
Improved.  Alk. Phos. Isoenzymes done 9-5-24, read by me, reviewed with pt.  Liver Fraction 63 up from 53, Bone fraction 34 down from 43, Intestinal fraction 2 down from 5

## 2024-09-11 NOTE — ASSESSMENT & PLAN NOTE
Worse.  U/A done 9-5-24, read by me, reviewed with pt.  U/A showed 2+ protein up from trace.  Pt. Declines referral to nephrologist.  Will repeat with next labs.

## 2024-09-11 NOTE — ASSESSMENT & PLAN NOTE
Doing excellent with medication.  Potassium done 9-5-24, read by me, reviewed with pt.  Potassium was resolved x 3.  Patient tolerated Potassium well without side effects. I feel the benefits of the medication outweigh the risks.

## 2024-09-12 ENCOUNTER — OFFICE VISIT (OUTPATIENT)
Dept: FAMILY MEDICINE CLINIC | Facility: CLINIC | Age: 69
End: 2024-09-12
Payer: MEDICARE

## 2024-09-12 VITALS
WEIGHT: 157.6 LBS | BODY MASS INDEX: 23.89 KG/M2 | OXYGEN SATURATION: 98 % | HEART RATE: 80 BPM | HEIGHT: 68 IN | DIASTOLIC BLOOD PRESSURE: 82 MMHG | SYSTOLIC BLOOD PRESSURE: 132 MMHG | TEMPERATURE: 97.1 F | RESPIRATION RATE: 18 BRPM

## 2024-09-12 DIAGNOSIS — E03.8 OTHER SPECIFIED HYPOTHYROIDISM: ICD-10-CM

## 2024-09-12 DIAGNOSIS — E83.42 HYPOMAGNESEMIA: ICD-10-CM

## 2024-09-12 DIAGNOSIS — R80.8 OTHER PROTEINURIA: ICD-10-CM

## 2024-09-12 DIAGNOSIS — R31.9 HEMATURIA, UNSPECIFIED TYPE: ICD-10-CM

## 2024-09-12 DIAGNOSIS — I10 PRIMARY HYPERTENSION: Primary | ICD-10-CM

## 2024-09-12 DIAGNOSIS — E78.2 MIXED HYPERLIPIDEMIA: ICD-10-CM

## 2024-09-12 DIAGNOSIS — R74.8 ELEVATED ALKALINE PHOSPHATASE LEVEL: ICD-10-CM

## 2024-09-12 DIAGNOSIS — E87.6 HYPOKALEMIA: ICD-10-CM

## 2024-09-12 PROCEDURE — 99214 OFFICE O/P EST MOD 30 MIN: CPT | Performed by: FAMILY MEDICINE

## 2024-09-12 PROCEDURE — 1159F MED LIST DOCD IN RCRD: CPT | Performed by: FAMILY MEDICINE

## 2024-09-12 PROCEDURE — 3079F DIAST BP 80-89 MM HG: CPT | Performed by: FAMILY MEDICINE

## 2024-09-12 PROCEDURE — G2211 COMPLEX E/M VISIT ADD ON: HCPCS | Performed by: FAMILY MEDICINE

## 2024-09-12 PROCEDURE — 1126F AMNT PAIN NOTED NONE PRSNT: CPT | Performed by: FAMILY MEDICINE

## 2024-09-12 PROCEDURE — 3075F SYST BP GE 130 - 139MM HG: CPT | Performed by: FAMILY MEDICINE

## 2024-09-12 PROCEDURE — 1160F RVW MEDS BY RX/DR IN RCRD: CPT | Performed by: FAMILY MEDICINE

## 2024-09-12 RX ORDER — POTASSIUM CHLORIDE 1500 MG/1
20 TABLET, EXTENDED RELEASE ORAL 4 TIMES DAILY
Start: 2024-09-12

## 2024-09-12 RX ORDER — ATORVASTATIN CALCIUM 10 MG/1
10 TABLET, FILM COATED ORAL DAILY
Start: 2024-09-12

## 2024-09-12 RX ORDER — LANOLIN ALCOHOL/MO/W.PET/CERES
CREAM (GRAM) TOPICAL
Start: 2024-09-12

## 2024-09-12 RX ORDER — LEVOTHYROXINE SODIUM 88 UG/1
88 TABLET ORAL DAILY
Start: 2024-09-12

## 2024-09-12 NOTE — PROGRESS NOTES
Subjective   Lavelle Mitchell is a 69 y.o. male.     Hypertension  This is a chronic problem. The current episode started more than 1 year ago. The problem is unchanged. The problem is controlled. Pertinent negatives include no chest pain, palpitations or shortness of breath.   Hyperlipidemia  This is a chronic problem. The current episode started more than 1 year ago. The problem is controlled. Recent lipid tests were reviewed and are high. Exacerbating diseases include hypothyroidism. Factors aggravating his hyperlipidemia include fatty foods. Pertinent negatives include no chest pain, myalgias or shortness of breath. Current antihyperlipidemic treatment includes statins. The current treatment provides significant improvement of lipids. There are no compliance problems.  Risk factors for coronary artery disease include dyslipidemia and hypertension.   Hypothyroidism  Presents for follow-up visit. Patient reports no cold intolerance, dry skin, fatigue, heat intolerance, palpitations, weight gain or weight loss. The symptoms have been improving. His past medical history is significant for hyperlipidemia.   Abnormal Lab  This is a chronic problem. The current episode started more than 1 year ago. The problem occurs constantly. The problem has been unchanged. Pertinent negatives include no chest pain, fatigue, myalgias or nausea.        The following portions of the patient's history were reviewed and updated as appropriate: allergies, current medications, past family history, past medical history, past social history, past surgical history, and problem list.    Family History   Problem Relation Age of Onset    Heart disease Mother     Hyperlipidemia Mother     Diabetes Mother     Cancer Mother         stomach    Vision loss Mother         mother hole in her eye    Heart disease Father     Stroke Father     Hyperlipidemia Father     Other Father         colon polyps    Hypertension Sister     Diabetes Sister     Birth  defects Maternal Grandmother     Birth defects Paternal Grandmother        Social History     Tobacco Use    Smoking status: Former     Types: Pipe     Quit date: 2008     Years since quittin.2    Smokeless tobacco: Never   Substance Use Topics    Alcohol use: Yes     Comment: 1 drink weekly    Drug use: No       Past Surgical History:   Procedure Laterality Date    CHOLECYSTECTOMY      COLON SURGERY      COLONOSCOPY      LYMPH NODE BIOPSY         Patient Active Problem List   Diagnosis    Hyperlipidemia    Hypertension    Hypothyroidism    Elevated alkaline phosphatase level    Hypomagnesemia    Malignant neoplasm of colon    Right bundle branch block    Hearing loss    Screening PSA (prostate specific antigen)    History of cancer tonsil    Unilateral inguinal hernia without obstruction or gangrene    Hypokalemia    Gastroesophageal reflux disease    BPH (benign prostatic hyperplasia)    Encounter for general adult medical examination with abnormal findings    Family history of stroke    Family history of diabetes mellitus    Welcome to Medicare preventive visit    Need for immunization against influenza    Other proteinuria    Medicare annual wellness visit, subsequent    Advanced directive placed in chart this admission    Immunization counseling    Depression screen       Current Outpatient Medications on File Prior to Visit   Medication Sig Dispense Refill    omeprazole (priLOSEC) 20 MG capsule Take 1 capsule by mouth Daily. 90 capsule 1     No current facility-administered medications on file prior to visit.       No Known Allergies    Review of Systems   Constitutional:  Negative for appetite change, fatigue, unexpected weight gain and unexpected weight loss.   Respiratory:  Negative for shortness of breath.    Cardiovascular:  Negative for chest pain, palpitations and leg swelling.   Gastrointestinal:  Negative for nausea.   Endocrine: Negative for cold intolerance and heat intolerance.  "  Musculoskeletal:  Negative for myalgias.   Skin:  Negative for dry skin.   Neurological:  Negative for headache.       Objective   Visit Vitals  /82 (BP Location: Left arm, Patient Position: Sitting, Cuff Size: Adult)   Pulse 80   Temp 97.1 °F (36.2 °C) (Temporal)   Resp 18   Ht 172.7 cm (68\")   Wt 71.5 kg (157 lb 9.6 oz)   SpO2 98%   BMI 23.96 kg/m²     Physical Exam  Vitals and nursing note reviewed.   Constitutional:       Appearance: He is well-developed.   HENT:      Head: Normocephalic.   Neck:      Thyroid: No thyromegaly.      Vascular: No carotid bruit.      Trachea: Trachea normal.   Cardiovascular:      Rate and Rhythm: Normal rate and regular rhythm.      Heart sounds: No murmur heard.     No friction rub. No gallop.   Pulmonary:      Effort: Pulmonary effort is normal. No respiratory distress.      Breath sounds: Normal breath sounds. No wheezing.   Chest:      Chest wall: No tenderness.   Musculoskeletal:      Cervical back: Neck supple.   Skin:     General: Skin is dry.      Findings: No rash.      Nails: There is no clubbing.   Neurological:      Mental Status: He is alert and oriented to person, place, and time.   Psychiatric:         Behavior: Behavior is cooperative.           Assessment & Plan .  Problem List Items Addressed This Visit          Medium    Hyperlipidemia    Current Assessment & Plan     Lipid and CMP done 9-5-24, read by me, reviewed with pt.  Trig. 328 down from 449, Toty. Chol. 138 down from 165, HDL 45 same as last, LDL 44 down from 53  Improved.   Encouraged to watch fatty intake, exercise more, and lose weight.  Compliant with medication.  Patient tolerated Lipitor well without side effects. I feel the benefits of the medication outweigh the risks.    Is not getting adequate diet and exercise  Goals developed at last visit were not met close to goal.  Follow up in  3 months  Care management needs are self-addressed.  Self-management abilities addressed and patient is " capable of managing his own disease.           Relevant Medications    atorvastatin (LIPITOR) 10 MG tablet    Hypertension - Primary    Overview     No medication other than potassium         Current Assessment & Plan     Doing well  Encouraged to watch salt, exercise more and lose weight.           Hypothyroidism    Current Assessment & Plan     Doing well--TSH done 9-5-24, read by me, reviewed with pt.  TSH was 1.230 down from 4.680.  Patient tolerated Synthroid well without side effects. I feel the benefits of the medication outweigh the risks.          Relevant Medications    levothyroxine (SYNTHROID, LEVOTHROID) 88 MCG tablet       Low    Elevated alkaline phosphatase level    Overview     Asymptomatic         Current Assessment & Plan     Improved.  Alk. Phos. Isoenzymes done 9-5-24, read by me, reviewed with pt.  Liver Fraction 63 up from 53, Bone fraction 34 down from 43, Intestinal fraction 2 down from 5         Hypokalemia    Current Assessment & Plan     Doing excellent with medication.  Potassium done 9-5-24, read by me, reviewed with pt.  Potassium was resolved x 3.  Patient tolerated Potassium well without side effects. I feel the benefits of the medication outweigh the risks.          Relevant Medications    potassium chloride (KLOR-CON M20) 20 MEQ CR tablet    Hypomagnesemia    Current Assessment & Plan     Worse due to noncompliance forgetting meds pt. Has promised to take magnesium as prescribed.  Magnesium done 9-5-24, read by me, reviewed with pt.  Magnesium was 1.5 down from 1.8.  Patient tolerated Magnesium well without side effects. I feel the benefits of the medication outweigh the risks.          Relevant Medications    magnesium chloride ER (MagDelay) 64 MG DR tablet       Unprioritized    RESOLVED: Hematuria    Current Assessment & Plan     Resolved x 3.thus delete.  U/A done 9-5-24, read by me, reviewed with pt.  Resolved x 3         Other proteinuria    Current Assessment & Plan      Worse.  U/A done 9-5-24, read by me, reviewed with pt.  U/A showed 2+ protein up from trace.  Pt. Declines referral to nephrologist.  Will repeat with next labs.

## 2024-12-19 ENCOUNTER — OFFICE VISIT (OUTPATIENT)
Dept: FAMILY MEDICINE CLINIC | Facility: CLINIC | Age: 69
End: 2024-12-19
Payer: MEDICARE

## 2024-12-19 VITALS
WEIGHT: 160.2 LBS | BODY MASS INDEX: 24.28 KG/M2 | RESPIRATION RATE: 14 BRPM | OXYGEN SATURATION: 99 % | TEMPERATURE: 97.2 F | DIASTOLIC BLOOD PRESSURE: 79 MMHG | HEART RATE: 70 BPM | SYSTOLIC BLOOD PRESSURE: 138 MMHG | HEIGHT: 68 IN

## 2024-12-19 DIAGNOSIS — E03.8 OTHER SPECIFIED HYPOTHYROIDISM: ICD-10-CM

## 2024-12-19 DIAGNOSIS — Z23 NEED FOR IMMUNIZATION AGAINST INFLUENZA: ICD-10-CM

## 2024-12-19 DIAGNOSIS — I45.10 RIGHT BUNDLE BRANCH BLOCK: ICD-10-CM

## 2024-12-19 DIAGNOSIS — Z71.85 IMMUNIZATION COUNSELING: ICD-10-CM

## 2024-12-19 DIAGNOSIS — Z78.9 ADVANCED DIRECTIVE PLACED IN CHART THIS ADMISSION: Primary | ICD-10-CM

## 2024-12-19 DIAGNOSIS — Z00.00 MEDICARE ANNUAL WELLNESS VISIT, SUBSEQUENT: ICD-10-CM

## 2024-12-19 DIAGNOSIS — Z12.5 SCREENING PSA (PROSTATE SPECIFIC ANTIGEN): ICD-10-CM

## 2024-12-19 DIAGNOSIS — N40.0 BENIGN PROSTATIC HYPERPLASIA, UNSPECIFIED WHETHER LOWER URINARY TRACT SYMPTOMS PRESENT: ICD-10-CM

## 2024-12-19 DIAGNOSIS — E78.2 MIXED HYPERLIPIDEMIA: Primary | ICD-10-CM

## 2024-12-19 DIAGNOSIS — I10 PRIMARY HYPERTENSION: ICD-10-CM

## 2024-12-19 DIAGNOSIS — Z13.31 DEPRESSION SCREEN: ICD-10-CM

## 2024-12-19 DIAGNOSIS — K21.9 GASTROESOPHAGEAL REFLUX DISEASE WITHOUT ESOPHAGITIS: ICD-10-CM

## 2024-12-19 DIAGNOSIS — E83.42 HYPOMAGNESEMIA: ICD-10-CM

## 2024-12-19 PROCEDURE — 90662 IIV NO PRSV INCREASED AG IM: CPT | Performed by: FAMILY MEDICINE

## 2024-12-19 PROCEDURE — 1126F AMNT PAIN NOTED NONE PRSNT: CPT | Performed by: FAMILY MEDICINE

## 2024-12-19 PROCEDURE — G0008 ADMIN INFLUENZA VIRUS VAC: HCPCS | Performed by: FAMILY MEDICINE

## 2024-12-19 PROCEDURE — 99214 OFFICE O/P EST MOD 30 MIN: CPT | Performed by: FAMILY MEDICINE

## 2024-12-19 PROCEDURE — 93000 ELECTROCARDIOGRAM COMPLETE: CPT | Performed by: FAMILY MEDICINE

## 2024-12-19 NOTE — PROGRESS NOTES
Subjective   Lavelle Mitchell is a 69 y.o. male.   No chief complaint on file.    I have identified multiple medical problems which are significant and separately identifiable that require added work above and beyond the wellness visit. These are not trivial or insignificant and are billed with a 25-modifier    History of Present Illness    Right bundle branch block    Hypertension  This is a chronic problem. The current episode started more than 1 year ago. The problem has been improved since onset. The problem is controlled. Pertinent negatives include no chest pain, palpitations or shortness of breath.   Heartburn  He reports no abdominal pain, no belching, no chest pain, no choking, no coughing, no heartburn, no nausea or no wheezing. This is a chronic problem. The current episode started more than 1 year ago. The problem occurs rarely. The problem has been gradually improving. Pertinent negatives include no fatigue.   Benign Prostatic Hypertrophy  This is a chronic problem. The problem has been gradually improving since onset. Irritative symptoms do not include frequency or urgency. Pertinent negatives include no nausea.                  Review of Systems   Constitutional:  Negative for fatigue.   HENT:  Negative for hearing loss.    Respiratory:  Negative for cough, choking, shortness of breath and wheezing.    Cardiovascular:  Negative for chest pain and palpitations.   Gastrointestinal:  Negative for abdominal pain and nausea.   Genitourinary:  Negative for frequency and urgency.        Nocturia   Musculoskeletal:  Negative for joint swelling.   Neurological:  Negative for memory problem.       Objective     Physical Exam  Vitals and nursing note reviewed.   Constitutional:       Appearance: He is well-developed.   HENT:      Head: Normocephalic.   Neck:      Thyroid: No thyromegaly.      Vascular: No carotid bruit.      Trachea: Trachea normal.   Cardiovascular:      Rate and Rhythm: Normal rate and regular  rhythm.      Heart sounds: No murmur heard.     No friction rub. No gallop.   Pulmonary:      Effort: Pulmonary effort is normal. No respiratory distress.      Breath sounds: Normal breath sounds. No wheezing.   Chest:      Chest wall: No tenderness.   Musculoskeletal:      Cervical back: Neck supple.   Skin:     General: Skin is dry.      Findings: No rash.      Nails: There is no clubbing.   Neurological:      Mental Status: He is alert and oriented to person, place, and time.   Psychiatric:         Behavior: Behavior is cooperative.         Assessment & Plan   Problem List Items Addressed This Visit          Medium    Hyperlipidemia - Primary    Current Assessment & Plan      Will order labs today and patient will return for results and shared decision making.           Relevant Orders    Comprehensive Metabolic Panel (Completed)    Lipid Panel (Completed)    Hypertension    Overview     No medication other than potassium         Current Assessment & Plan     -Doing well; Encouraged to watch salt, exercise more and lose weight.  No medication         Hypothyroidism    Current Assessment & Plan     Will order labs today and patient will return for results and shared decision making.           Relevant Orders    TSH (Completed)       Low    BPH (benign prostatic hyperplasia)    Current Assessment & Plan     Doing well- No medication         Gastroesophageal reflux disease    Current Assessment & Plan     Doing well.  Patient tolerated Omeprazole well without side effects. I feel the benefits of the medication outweigh the risks.          Hypomagnesemia    Current Assessment & Plan     Will order labs today and patient will return for results and shared decision making.           Relevant Orders    Magnesium (Completed)    Right bundle branch block    Overview     Last EKG was done December 6, 2023 shows moderate intraventricular conduction delay.         Current Assessment & Plan     EKG done today and read by  myself shows normal sinus rhythm with ventricular rate of 67 with borderline left axis deviation - Stable from 11-25-22         Relevant Orders    ECG 12 Lead       Unprioritized    Need for immunization against influenza    Relevant Orders    Fluzone High-Dose 65+yrs ([id number]) (Completed)    Screening PSA (prostate specific antigen)    Current Assessment & Plan     Will order labs today and patient will return for results and shared decision making.           Relevant Orders    PSA Screen (Completed)

## 2024-12-19 NOTE — ASSESSMENT & PLAN NOTE
EKG done today and read by myself shows normal sinus rhythm with ventricular rate of 67 with borderline left axis deviation - Stable from 11-25-22

## 2024-12-19 NOTE — PROGRESS NOTES
The ABCs of the Annual Wellness Visit  Subsequent Medicare Wellness Visit        Subjective    History of Present Illness:  Lavelle Mitchell is a 69 y.o. male who presents for a Subsequent Medicare Wellness Visit.    The following portions of the patient's history were reviewed and   updated as appropriate: allergies, current medications, past family history, past medical history, past social history, past surgical history, and problem list.      Recent Hospitalizations:  He was not admitted to the hospital during the last year.       Current Medical Providers:  Patient Care Team:  Paolo Penny MD as PCP - General  Paolo Penny MD as PCP - Family Medicine  Dr. Burton ([facility])    Outpatient Medications Prior to Visit   Medication Sig Dispense Refill    atorvastatin (LIPITOR) 10 MG tablet Take 1 tablet by mouth Daily.      levothyroxine (SYNTHROID, LEVOTHROID) 88 MCG tablet Take 1 tablet by mouth Daily.      magnesium chloride ER (MagDelay) 64 MG DR tablet Take 4 pills daily      omeprazole (priLOSEC) 20 MG capsule Take 1 capsule by mouth Daily. 90 capsule 1    potassium chloride (KLOR-CON M20) 20 MEQ CR tablet Take 1 tablet by mouth 4 (Four) Times a Day.       No facility-administered medications prior to visit.       No opioid medication identified on active medication list. I have reviewed chart for other potential  high risk medication/s and harmful drug interactions in the elderly.        Aspirin is not on active medication list.  Aspirin use is not indicated based on review of current medical condition/s. Risk of harm outweighs potential benefits.  .    Patient Active Problem List   Diagnosis    Hyperlipidemia    Hypertension    Hypothyroidism    Elevated alkaline phosphatase level    Hypomagnesemia    Malignant neoplasm of colon    Right bundle branch block    Hearing loss    Screening PSA (prostate specific antigen)    History of cancer tonsil    Unilateral inguinal hernia without  "obstruction or gangrene    Hypokalemia    Gastroesophageal reflux disease    BPH (benign prostatic hyperplasia)    Encounter for general adult medical examination with abnormal findings    Family history of stroke    Family history of diabetes mellitus    Welcome to Medicare preventive visit    Need for immunization against influenza    Other proteinuria    Medicare annual wellness visit, subsequent    Advanced directive placed in chart this admission    Immunization counseling    Depression screen            Above medical problems all reviewed and significant problems identified and reviewed in the E and M visit.   Objective          Vitals:    12/19/24 0911   BP: 138/79   BP Location: Left arm   Patient Position: Sitting   Cuff Size: Adult   Pulse: 70   Resp: 14   Temp: 97.2 °F (36.2 °C)   SpO2: 99%   Weight: 72.7 kg (160 lb 3.2 oz)   Height: 172.7 cm (68\")   PainSc: 0-No pain     Estimated body mass index is 24.36 kg/m² as calculated from the following:    Height as of this encounter: 172.7 cm (68\").    Weight as of this encounter: 72.7 kg (160 lb 3.2 oz).    BMI is within normal parameters. No other follow-up for BMI required.      Does the patient have evidence of cognitive impairment? No           Family History   Problem Relation Age of Onset    Heart disease Mother     Hyperlipidemia Mother     Diabetes Mother     Cancer Mother         stomach    Vision loss Mother         mother hole in her eye    Heart disease Father     Stroke Father     Hyperlipidemia Father     Other Father         colon polyps    Hypertension Sister     Diabetes Sister     Birth defects Maternal Grandmother     Birth defects Paternal Grandmother        Compared to one year ago, the patient feels his physical   health is the same.    Compared to one year ago, the patient feels his mental   health is the same.    HEALTH RISK ASSESSMENT    Smoking Status:  Social History     Tobacco Use   Smoking Status Former    Types: Pipe    Quit date: " 2008    Years since quittin.4   Smokeless Tobacco Never     Alcohol Consumption:  Social History     Substance and Sexual Activity   Alcohol Use Yes    Comment: 1 drink weekly     Fall Risk Screen:    KHLOEADI Fall Risk Assessment was completed, and patient is at LOW risk for falls.Assessment completed on:2024    Depression Screening:  Depression screen reviewed and discussed with patient. Recommendations were not needed. Medications were not discussed, counseling was not discussed. We spent 3 minutes with the screen and discussion of depression and options.         2024     9:15 AM   PHQ-2/PHQ-9 Depression Screening   Little interest or pleasure in doing things Not at all   Feeling down, depressed, or hopeless Not at all   How difficult have these problems made it for you to do your work, take care of things at home, or get along with other people? Not difficult at all       Health Habits and Functional and Cognitive Screenin/19/2024     9:13 AM   Functional & Cognitive Status   Do you have difficulty preparing food and eating? No   Do you have difficulty bathing yourself, getting dressed or grooming yourself? No   Do you have difficulty using the toilet? No   Do you have difficulty moving around from place to place? No   Do you have trouble with steps or getting out of a bed or a chair? No   Current Diet Unhealthy Diet   Dental Exam Other        Dental Exam Comment No teeth   Eye Exam Not up to date        Eye Exam Comment Summer 2023   Exercise (times per week) 7 times per week   Current Exercises Include Walking   Do you need help using the phone?  No   Are you deaf or do you have serious difficulty hearing?  No   Do you need help to go to places out of walking distance? No   Do you need help shopping? No   Do you need help preparing meals?  No   Do you need help with housework?  No   Do you need help with laundry? No   Do you need help taking your medications? No   Do you need help  managing money? No   Do you ever drive or ride in a car without wearing a seat belt? No   Have you felt unusual stress, anger or loneliness in the last month? No   Who do you live with? Spouse   If you need help, do you have trouble finding someone available to you? No   Have you been bothered in the last four weeks by sexual problems? No   Do you have difficulty concentrating, remembering or making decisions? No       Age-appropriate Screening Schedule:  Refer to the list below for future screening recommendations based on patient's age, sex and/or medical conditions. Orders for these recommended tests are listed in the plan section. The patient has been provided with a written plan.    Health Maintenance   Topic Date Due    HEPATITIS C SCREENING  Never done    COVID-19 Vaccine (3 - 2024-25 season) 09/01/2024    ANNUAL WELLNESS VISIT  12/19/2025    LIPID PANEL  12/19/2025    TDAP/TD VACCINES (2 - Td or Tdap) 10/22/2029    INFLUENZA VACCINE  Completed    Pneumococcal Vaccine 65+  Completed    AAA SCREEN (ONE-TIME)  Completed    ZOSTER VACCINE  Completed    COLONOSCOPY  Discontinued       Immunizations:  Lavelle Mitchell is due for Influenza    Colorectal Screening - GI does scopes for his hx of colon ca     Assessment & Plan   CMS Preventative Services Quick Reference    Risk Factors Identified During Encounter      Immunizations Discussed/Encouraged:  see notes  The above risks/problems have been discussed with the patient.  Follow up actions/plans if indicated are seen below in the Assessment/Plan Section.  Pertinent information has been shared with the patient in the After Visit Summary.    I have identified multiple medical problems which are significant and separately identifiable that require added work above and beyond the wellness visit. These are not trivial or insignificant and are billed with a 25-modifier  These are in a separate E/M note      Sit-to-Stand Exercise    The sit-to-stand exercise (also known  as the chair stand or chair rise exercise) strengthens your lower body and helps you maintain or improve your mobility and independence. The goal is to do the sit-to-stand exercise without using your hands. This will be easier as you become stronger. You should always talk with your health care provider before starting any exercise program, especially if you have had recent surgery.  Do the exercise exactly as told by your health care provider and adjust it as directed. It is normal to feel mild stretching, pulling, tightness, or discomfort as you do this exercise, but you should stop right away if you feel sudden pain or your pain gets worse. Do not begin doing this exercise until told by your health care provider.  What the sit-to-stand exercise does  The sit-to-stand exercise helps to strengthen the muscles in your thighs and the muscles in the center of your body that give you stability (core muscles). This exercise is especially helpful if:  You have had knee or hip surgery.  You have trouble getting up from a chair, out of a car, or off the toilet.  How to do the sit-to-stand exercise  Sit toward the front edge of a sturdy chair without armrests. Your knees should be bent and your feet should be flat on the floor and shoulder-width apart.  Place your hands lightly on each side of the seat. Keep your back and neck as straight as possible, with your chest slightly forward.  Breathe in slowly. Lean forward and slightly shift your weight to the front of your feet.  Breathe out as you slowly stand up. Use your hands as little as possible.  Stand and pause for a full breath in and out.  Breathe in as you sit down slowly. Tighten your core and abdominal muscles to control your lowering as much as possible.  Breathe out slowly.  Do this exercise 10-15 times. If needed, do it fewer times until you build up strength.  Rest for 1 minute, then do another set of 10-15 repetitions.  To change the difficulty of the  sit-to-stand exercise  If the exercise is too difficult, use a chair with sturdy armrests, and push off the armrests to help you come to the standing position. You can also use the armrests to help slowly lower yourself back to sitting. As this gets easier, try to use your arms less. You can also place a firm cushion or pillow on the chair to make the surface higher.  If this exercise is too easy, do not use your arms to help raise or lower yourself. You can also wear a weighted vest, use hand weights, increase your repetitions, or try a lower chair.  General tips  You may feel tired when starting an exercise routine. This is normal.  You may have muscle soreness that lasts a few days. This is normal. As you get stronger, you may not feel muscle soreness.  Use smooth, steady movements.  Do not  hold your breath during strength exercises. This can cause unsafe changes in your blood pressure.  Breathe in slowly through your nose, and breathe out slowly through your mouth.  Summary  Strengthening your lower body is an important step to help you move safely and independently.  The sit-to-stand exercise helps strengthen the muscles in your thighs and core.  You should always talk with your health care provider before starting any exercise program, especially if you have had recent surgery.  This information is not intended to replace advice given to you by your health care provider. Make sure you discuss any questions you have with your health care provider.  Document Revised: 10/16/2019 Document Reviewed: 02/08/2018  Elsevier Patient Education © 2021 [employer]      Fall Prevention in the Home, Adult  Falls can cause injuries and can happen to people of all ages. There are many things you can do to make your home safe and to help prevent falls. Ask for help when making these changes.  What actions can I take to prevent falls?  General Instructions  Use good lighting in all rooms. Replace any light bulbs that burn  out.  Turn on the lights in dark areas. Use night-lights.  Keep items that you use often in easy-to-reach places. Lower the shelves around your home if needed.  Set up your furniture so you have a clear path. Avoid moving your furniture around.  Do not have throw rugs or other things on the floor that can make you trip.  Avoid walking on wet floors.  If any of your floors are uneven, fix them.  Add color or contrast paint or tape to clearly sergio and help you see:  Grab bars or handrails.  First and last steps of staircases.  Where the edge of each step is.  If you use a stepladder:  Make sure that it is fully opened. Do not climb a closed stepladder.  Make sure the sides of the stepladder are locked in place.  Ask someone to hold the stepladder while you use it.  Know where your pets are when moving through your home.  What can I do in the bathroom?         Keep the floor dry. Clean up any water on the floor right away.  Remove soap buildup in the tub or shower.  Use nonskid mats or decals on the floor of the tub or shower.  Attach bath mats securely with double-sided, nonslip rug tape.  If you need to sit down in the shower, use a plastic, nonslip stool.  Install grab bars by the toilet and in the tub and shower. Do not use towel bars as grab bars.  What can I do in the bedroom?  Make sure that you have a light by your bed that is easy to reach.  Do not use any sheets or blankets for your bed that hang to the floor.  Have a firm chair with side arms that you can use for support when you get dressed.  What can I do in the kitchen?  Clean up any spills right away.  If you need to reach something above you, use a step stool with a grab bar.  Keep electrical cords out of the way.  Do not use floor polish or wax that makes floors slippery.  What can I do with my stairs?  Do not leave any items on the stairs.  Make sure that you have a light switch at the top and the bottom of the stairs.  Make sure that there are  handrails on both sides of the stairs. Fix handrails that are broken or loose.  Install nonslip stair treads on all your stairs.  Avoid having throw rugs at the top or bottom of the stairs.  Choose a carpet that does not hide the edge of the steps on the stairs.  Check carpeting to make sure that it is firmly attached to the stairs. Fix carpet that is loose or worn.  What can I do on the outside of my home?  Use bright outdoor lighting.  Fix the edges of walkways and driveways and fix any cracks.  Remove anything that might make you trip as you walk through a door, such as a raised step or threshold.  Trim any bushes or trees on paths to your home.  Check to see if handrails are loose or broken and that both sides of all steps have handrails.  Install guardrails along the edges of any raised decks and porches.  Clear paths of anything that can make you trip, such as tools or rocks.  Have leaves, snow, or ice cleared regularly.  Use sand or salt on paths during winter.  Clean up any spills in your garage right away. This includes grease or oil spills.  What other actions can I take?  Wear shoes that:  Have a low heel. Do not wear high heels.  Have rubber bottoms.  Feel good on your feet and fit well.  Are closed at the toe. Do not wear open-toe sandals.  Use tools that help you move around if needed. These include:  Canes.  Walkers.  Scooters.  Crutches.  Review your medicines with your doctor. Some medicines can make you feel dizzy. This can increase your chance of falling.  Ask your doctor what else you can do to help prevent falls.  Where to find more information  Centers for Disease Control and Prevention, [place]: www.cdc.gov  National [place] on Aging: www.[place].nih.gov  Contact a doctor if:  You are afraid of falling at home.  You feel weak, drowsy, or dizzy at home.  You fall at home.  Summary  There are many simple things that you can do to make your home safe and to help prevent falls.  Ways to make your  home safe include removing things that can make you trip and installing grab bars in the bathroom.  Ask for help when making these changes in your home.  This information is not intended to replace advice given to you by your health care provider. Make sure you discuss any questions you have with your health care provider.  Document Revised: 07/21/2021 Document Reviewed: 07/21/2021  [employer] Patient Education © 2021 [employer]            Advance Care Planning    Advance Directive is on file.  ACP discussion was held with the patient during this visit. Patient has an advance directive in EMR which is still valid.   Discussion with Patient regarding advanced directives. POST form discussed at length and reviewed with patient. POST reviewed and updated today. I spent 16 minutes  with patient reviewing information and documenting  in the chart.  Patient states he does want CPR. Reviewed medical interventions with patient and the differences between each: Comfort, Limited and Full. Patient opted for Full. Discussed the use of antibiotics at the end of life. He chose to use antibiotics consistent with treatment goals. Discussed artificially administered nutrition, patient is aware that if he is alert and oriented they can change their mind at any time. However, they have elected to have no artificial nutrition. Patient has identified his spouse Teresa Mitchell as his healthcare representative. Advised to discuss with healthcare representative if they can comply with wishes. Patient encouraged to have a meeting to discuss his decision regarding advanced care directives and goals of care with extended family and significant  friends  In regard to the POST form:The patient opted to complete POST while in the office and copy scanned into the chart. and advised to give to family members, place in an easily accessible place and take with them if going to the hospital or Emergency room.      Follow Up:   Future Appointments          Provider Department Center    12/30/2024 8:00 AM Paolo Penny MD [facility]            An After Visit Summary and PPPS were made available to the patient.      Diagnoses and all orders for this visit:    1. Advanced directive placed in chart this admission (Primary)  Assessment & Plan:  -POST completed and scanned into chart      2. Medicare annual wellness visit, subsequent  Assessment & Plan:  Completed--POST and Depression screen also done        3. Depression screen  Overview:  Completed 12/6/2023--patient is low risk    Assessment & Plan:  Depression screen done and was normal      4. Immunization counseling  Overview:  UTD as of 12-6-23  Covid  3-25-21 and 2-25-21  Pneu 20 12-6-23,   Flu 12-6-23  Pneu 13  11-13-17  Shingrix 2-9-21 and 10-21-20  T-dap 10-      Assessment & Plan:  UTD as of 12-19-24- Flu given today

## 2024-12-21 LAB
ALBUMIN SERPL-MCNC: 4.6 G/DL (ref 3.9–4.9)
ALP SERPL-CCNC: 141 IU/L (ref 44–121)
ALT SERPL-CCNC: 38 IU/L (ref 0–44)
AST SERPL-CCNC: 25 IU/L (ref 0–40)
BILIRUB SERPL-MCNC: 1 MG/DL (ref 0–1.2)
BUN SERPL-MCNC: 17 MG/DL (ref 8–27)
BUN/CREAT SERPL: 14 (ref 10–24)
CALCIUM SERPL-MCNC: 10.3 MG/DL (ref 8.6–10.2)
CHLORIDE SERPL-SCNC: 101 MMOL/L (ref 96–106)
CHOLEST SERPL-MCNC: 137 MG/DL (ref 100–199)
CO2 SERPL-SCNC: 25 MMOL/L (ref 20–29)
CREAT SERPL-MCNC: 1.23 MG/DL (ref 0.76–1.27)
EGFRCR SERPLBLD CKD-EPI 2021: 64 ML/MIN/1.73
GLOBULIN SER CALC-MCNC: 2.7 G/DL (ref 1.5–4.5)
GLUCOSE SERPL-MCNC: 87 MG/DL (ref 70–99)
HDLC SERPL-MCNC: 41 MG/DL
LDLC SERPL CALC-MCNC: 42 MG/DL (ref 0–99)
MAGNESIUM SERPL-MCNC: 1.7 MG/DL (ref 1.6–2.3)
POTASSIUM SERPL-SCNC: 4 MMOL/L (ref 3.5–5.2)
PROT SERPL-MCNC: 7.3 G/DL (ref 6–8.5)
PSA SERPL-MCNC: 0.3 NG/ML (ref 0–4)
SODIUM SERPL-SCNC: 139 MMOL/L (ref 134–144)
TRIGL SERPL-MCNC: 369 MG/DL (ref 0–149)
TSH SERPL DL<=0.005 MIU/L-ACNC: 2.05 UIU/ML (ref 0.45–4.5)
VLDLC SERPL CALC-MCNC: 54 MG/DL (ref 5–40)

## 2024-12-23 NOTE — ASSESSMENT & PLAN NOTE
Worse.  CMP  done 12-, read by me, reviewed with pt.  Alk.Phos was 141 up from 127.  Pt. Declines work up, will repeat with next labs

## 2024-12-23 NOTE — ASSESSMENT & PLAN NOTE
Doing well x 1.  Magnesium done 12-, read by me, reviewed with pt.  Magnesium was 1.7 up from 1.5.  Patient tolerated Magnesium well without side effects. I feel the benefits of the medication outweigh the risks.

## 2024-12-23 NOTE — ASSESSMENT & PLAN NOTE
Doing well.  TSH  done 12-, read by me, reviewed with pt.  TSH was 2.050 up from 1.230.  Patient tolerated Synthroid 88 MCG well without side effects. I feel the benefits of the medication outweigh the risks.

## 2024-12-23 NOTE — ASSESSMENT & PLAN NOTE
Resolved x 3.  CMP  done 12-, read by me, reviewed with pt.  Potassium was normal x 3.  Patient tolerated Potassium well without side effects. I feel the benefits of the medication outweigh the risks.

## 2024-12-23 NOTE — ASSESSMENT & PLAN NOTE
Lipid and CMP  done 12-, read by me, reviewed with pt. Trig. 369 up from 328, Tot. Chol. 137 down from 138, HDL 41 down from 45, LDL 42 down from 44  Stable. Discussed CT calcium score and vascular screen, pt. Agrees and orders given.  Encouraged to watch fatty intake, exercise more, and lose weight.   Compliant with medication.  Patient tolerated Lipitor well without side effects. I feel the benefits of the medication outweigh the risks.    Is not getting adequate diet and exercise  Goals developed at last visit were not met   Follow up in 4 months  Care management needs are self-addressed. Self-management abilities addressed and patient is capable of managing his own disease.

## 2024-12-30 ENCOUNTER — OFFICE VISIT (OUTPATIENT)
Dept: FAMILY MEDICINE CLINIC | Facility: CLINIC | Age: 69
End: 2024-12-30
Payer: MEDICARE

## 2024-12-30 VITALS
RESPIRATION RATE: 18 BRPM | TEMPERATURE: 97.1 F | SYSTOLIC BLOOD PRESSURE: 128 MMHG | OXYGEN SATURATION: 99 % | DIASTOLIC BLOOD PRESSURE: 80 MMHG | WEIGHT: 162.2 LBS | HEIGHT: 68 IN | BODY MASS INDEX: 24.58 KG/M2 | HEART RATE: 85 BPM

## 2024-12-30 DIAGNOSIS — Z83.3 FAMILY HISTORY OF DIABETES MELLITUS: ICD-10-CM

## 2024-12-30 DIAGNOSIS — H90.2 CONDUCTIVE HEARING LOSS, UNSPECIFIED LATERALITY: ICD-10-CM

## 2024-12-30 DIAGNOSIS — E87.6 HYPOKALEMIA: ICD-10-CM

## 2024-12-30 DIAGNOSIS — E83.42 HYPOMAGNESEMIA: ICD-10-CM

## 2024-12-30 DIAGNOSIS — I45.10 RIGHT BUNDLE BRANCH BLOCK: ICD-10-CM

## 2024-12-30 DIAGNOSIS — N40.0 BENIGN PROSTATIC HYPERPLASIA, UNSPECIFIED WHETHER LOWER URINARY TRACT SYMPTOMS PRESENT: ICD-10-CM

## 2024-12-30 DIAGNOSIS — Z13.6 SCREENING FOR CARDIOVASCULAR CONDITION: ICD-10-CM

## 2024-12-30 DIAGNOSIS — R80.8 OTHER PROTEINURIA: ICD-10-CM

## 2024-12-30 DIAGNOSIS — Z85.818 HISTORY OF CANCER TONSIL: ICD-10-CM

## 2024-12-30 DIAGNOSIS — C18.9 MALIGNANT NEOPLASM OF COLON, UNSPECIFIED PART OF COLON: ICD-10-CM

## 2024-12-30 DIAGNOSIS — Z12.5 SCREENING PSA (PROSTATE SPECIFIC ANTIGEN): ICD-10-CM

## 2024-12-30 DIAGNOSIS — E83.52 HYPERCALCEMIA: ICD-10-CM

## 2024-12-30 DIAGNOSIS — Z71.85 IMMUNIZATION COUNSELING: ICD-10-CM

## 2024-12-30 DIAGNOSIS — R74.8 ELEVATED ALKALINE PHOSPHATASE LEVEL: ICD-10-CM

## 2024-12-30 DIAGNOSIS — Z00.01 ENCOUNTER FOR GENERAL ADULT MEDICAL EXAMINATION WITH ABNORMAL FINDINGS: ICD-10-CM

## 2024-12-30 DIAGNOSIS — I10 PRIMARY HYPERTENSION: ICD-10-CM

## 2024-12-30 DIAGNOSIS — K40.90 UNILATERAL INGUINAL HERNIA WITHOUT OBSTRUCTION OR GANGRENE, RECURRENCE NOT SPECIFIED: ICD-10-CM

## 2024-12-30 DIAGNOSIS — E78.2 MIXED HYPERLIPIDEMIA: Primary | ICD-10-CM

## 2024-12-30 DIAGNOSIS — K21.9 GASTROESOPHAGEAL REFLUX DISEASE WITHOUT ESOPHAGITIS: ICD-10-CM

## 2024-12-30 DIAGNOSIS — E03.8 OTHER SPECIFIED HYPOTHYROIDISM: ICD-10-CM

## 2024-12-30 DIAGNOSIS — Z82.3 FAMILY HISTORY OF STROKE: ICD-10-CM

## 2024-12-30 PROCEDURE — 3074F SYST BP LT 130 MM HG: CPT | Performed by: FAMILY MEDICINE

## 2024-12-30 PROCEDURE — 3079F DIAST BP 80-89 MM HG: CPT | Performed by: FAMILY MEDICINE

## 2024-12-30 PROCEDURE — 99397 PER PM REEVAL EST PAT 65+ YR: CPT | Performed by: FAMILY MEDICINE

## 2024-12-30 PROCEDURE — 1126F AMNT PAIN NOTED NONE PRSNT: CPT | Performed by: FAMILY MEDICINE

## 2024-12-30 PROCEDURE — 99214 OFFICE O/P EST MOD 30 MIN: CPT | Performed by: FAMILY MEDICINE

## 2024-12-30 RX ORDER — POTASSIUM CHLORIDE 1500 MG/1
20 TABLET, EXTENDED RELEASE ORAL 4 TIMES DAILY
Start: 2024-12-30

## 2024-12-30 RX ORDER — ATORVASTATIN CALCIUM 10 MG/1
10 TABLET, FILM COATED ORAL DAILY
Qty: 90 TABLET | Refills: 3 | Status: SHIPPED | OUTPATIENT
Start: 2024-12-30

## 2024-12-30 RX ORDER — LANOLIN ALCOHOL/MO/W.PET/CERES
CREAM (GRAM) TOPICAL
Start: 2024-12-30

## 2024-12-30 RX ORDER — LEVOTHYROXINE SODIUM 88 UG/1
88 TABLET ORAL DAILY
Qty: 90 TABLET | Refills: 3 | Status: SHIPPED | OUTPATIENT
Start: 2024-12-30

## 2024-12-30 NOTE — PROGRESS NOTES
Subjective   Lavelle Mitchell is a 69 y.o. male here for his annual physical with me. Lavelle is here for coordination of medical care, to discuss health maintenance, disease prevention as well as to followup on medical problems. Patient has been followed by me since 2007. Patient's last CPE was 12-12-23. Activity level is moderate. Exercises 7 per week. Appetite is good. Feels well with many complaints. Energy level is good. Sleeps well. Patient's last colonoscopy was 2017 with Dr. Portillo. He was advised to repeat in 3 years, in 2020, but patient declines repeat colonoscopy 12-30-24, offered cologuard, pt. Declined cologuard 12-30-24.   Patient is doing routine self skin exam monthly. Patient is doing routine self-breast exams monthly. Patient is checking testicles monthly.    Lab Results   Component Value Date    PSA 0.3 12/19/2024        History of Present Illness  Follow up hx of colon cancer.    Follow up elevated alkaline Phosphate.    Follow up Hypercalcemia.  Hyperlipidemia  This is a chronic problem. The current episode started more than 1 year ago. The problem is controlled. Recent lipid tests were reviewed and are high. Exacerbating diseases include hypothyroidism. Factors aggravating his hyperlipidemia include fatty foods. Pertinent negatives include no chest pain, myalgias or shortness of breath. Current antihyperlipidemic treatment includes statins.   Hypothyroidism  Presents for follow-up visit. Patient reports no anxiety, cold intolerance, constipation, depressed mood, diarrhea, dry skin, fatigue, heat intolerance, palpitations, weight gain or weight loss. The symptoms have been stable. His past medical history is significant for hyperlipidemia.        The following portions of the patient's history were reviewed and updated as appropriate: allergies, current medications, past family history, past medical history, past social history, past surgical history, and problem list.    Past Medical History:    Diagnosis Date    Hyperlipidemia     Hypertension     Hypothyroidism        Family History   Problem Relation Age of Onset    Heart disease Mother     Hyperlipidemia Mother     Diabetes Mother     Cancer Mother         stomach    Vision loss Mother         mother hole in her eye    Heart disease Father     Stroke Father     Hyperlipidemia Father     Hypertension Sister     Diabetes Sister     Birth defects Maternal Grandmother     Birth defects Paternal Grandmother        Social History     Socioeconomic History    Marital status:      Spouse name: Juan M    Number of children: 2   Tobacco Use    Smoking status: Former     Types: Pipe     Quit date: 2008     Years since quittin.5    Smokeless tobacco: Never   Substance and Sexual Activity    Alcohol use: Yes     Comment: 1 drink monthly    Drug use: No    Sexual activity: Defer       Social History     Social History Narrative     3 x .  First wife  for 7.5 years, 1 child with first wife.  2nd wife  5 years, no children with 2nd wife.  3rd wife  , no children together.  Just the 2 of them at home.  Retired .   Caffeine 2 cups coffee daily, 2-3 sodas daily.  Exercise walking daily 20 minutes.  Always wears seatbelts.  Hobbies hunting and boweling.        Past Surgical History:   Procedure Laterality Date    CHOLECYSTECTOMY      COLON SURGERY      COLONOSCOPY      LYMPH NODE BIOPSY         No current outpatient medications on file prior to visit.     No current facility-administered medications on file prior to visit.       No Known Allergies    Review of Systems   Constitutional:  Negative for appetite change, chills, fatigue, fever, unexpected weight gain and unexpected weight loss.   HENT:  Negative for congestion, dental problem, ear discharge, ear pain, hearing loss, nosebleeds, postnasal drip, rhinorrhea, sinus pressure, sneezing, sore throat, tinnitus and voice change.         Last dental exam  apporox  "2010-had all teeth pulled   Eyes:  Negative for blurred vision, double vision, pain, redness and visual disturbance.        Last vision exam 5-2023 with Dr. Garcia-Advised to follow   Respiratory:  Negative for cough, shortness of breath, wheezing and stridor.    Cardiovascular:  Negative for chest pain, palpitations and leg swelling.   Gastrointestinal:  Negative for abdominal pain, anal bleeding, blood in stool, constipation, diarrhea, nausea, rectal pain, vomiting, GERD and indigestion.        Colostomy   Endocrine: Negative for cold intolerance, heat intolerance, polydipsia, polyphagia and polyuria.        Sex drive  He is a 0  She is a 0   Genitourinary:  Negative for difficulty urinating, dysuria, frequency, hematuria and urgency.   Musculoskeletal:  Negative for back pain, joint swelling, myalgias, neck pain and neck stiffness.   Skin:  Negative for color change, dry skin and rash.   Neurological:  Negative for dizziness, syncope, speech difficulty, weakness, light-headedness, headache and memory problem.   Hematological:  Does not bruise/bleed easily.   Psychiatric/Behavioral:  Negative for decreased concentration, sleep disturbance, depressed mood and stress. The patient is not nervous/anxious.        Objective   Visit Vitals  /80 (BP Location: Left arm, Patient Position: Sitting, Cuff Size: Adult)   Pulse 85   Temp 97.1 °F (36.2 °C) (Temporal)   Resp 18   Ht 172.7 cm (68\")   Wt 73.6 kg (162 lb 3.2 oz)   SpO2 99%   BMI 24.66 kg/m²     Physical Exam  Constitutional:       General: He is not in acute distress.     Appearance: He is well-developed. He is not diaphoretic.   HENT:      Head: Normocephalic.      Right Ear: Hearing, tympanic membrane and external ear normal. There is impacted cerumen (mild).      Left Ear: Hearing, tympanic membrane and external ear normal. There is impacted cerumen (mild).      Nose: Nose normal.      Mouth/Throat:      Lips: Pink.      Mouth: Mucous membranes are moist. "      Dentition: Abnormal dentition (no teeth).      Pharynx: Oropharynx is clear. No oropharyngeal exudate.   Eyes:      General: No scleral icterus.        Right eye: No discharge.         Left eye: No discharge.      Conjunctiva/sclera: Conjunctivae normal.      Pupils: Pupils are equal, round, and reactive to light.      Comments: Wears glasses.  Cataracts bilateral-mild   Cardiovascular:      Rate and Rhythm: Normal rate and regular rhythm.      Pulses:           Dorsalis pedis pulses are 0 on the right side and 0 on the left side.        Posterior tibial pulses are 0 on the right side and 0 on the left side.      Heart sounds: Normal heart sounds. No murmur heard.  Pulmonary:      Effort: Pulmonary effort is normal.      Breath sounds: Normal breath sounds. No wheezing.   Chest:      Chest wall: No tenderness.   Breasts:     Right: Normal. No swelling, bleeding, inverted nipple, mass, nipple discharge, skin change or tenderness.      Left: Normal. No swelling, bleeding, inverted nipple, mass, nipple discharge, skin change or tenderness.   Abdominal:      General: Bowel sounds are normal. There is no distension.      Palpations: Abdomen is soft. There is no mass.      Tenderness: There is no abdominal tenderness.      Hernia: No hernia is present.      Comments: Colostomy left lower abdomin   Genitourinary:     Penis: Normal and circumcised. No tenderness.       Testes: Normal.      Prostate: Normal.      Rectum: Normal.      Comments: No rectal exam due to anus being surgically closed.  Musculoskeletal:         General: No tenderness or deformity. Normal range of motion.      Cervical back: Normal range of motion and neck supple.   Lymphadenopathy:      Cervical: No cervical adenopathy.   Skin:     General: Skin is warm and dry.      Findings: No erythema or rash.      Comments: Scar right neck.  Scar LLQ epigastric to lower abdomin.  Scar vertical from epigastric to suprapubic  Nevus's scattered over the  back.  Onychomycosis moderate of the left 3 rd nail, Right Great nail and 4th and 5th nails are moderate .   Neurological:      General: No focal deficit present.      Mental Status: He is alert and oriented to person, place, and time.      Cranial Nerves: Cranial nerves 2-12 are intact. No cranial nerve deficit.      Sensory: Sensation is intact. No sensory deficit.      Motor: Motor function is intact. No abnormal muscle tone.      Coordination: Coordination is intact. Coordination normal.      Gait: Gait is intact.      Deep Tendon Reflexes: Reflexes normal.   Psychiatric:         Behavior: Behavior normal.         Thought Content: Thought content normal.         Judgment: Judgment normal.         Assessment & Plan   Problem List Items Addressed This Visit          Medium    Hyperlipidemia - Primary    Current Assessment & Plan     Lipid and CMP  done 12-, read by me, reviewed with pt. Trig. 369 up from 328, Tot. Chol. 137 down from 138, HDL 41 down from 45, LDL 42 down from 44  Stable. Discussed CT calcium score and vascular screen, pt. Agrees and orders given.  Encouraged to watch fatty intake, exercise more, and lose weight.   Compliant with medication.  Patient tolerated Lipitor well without side effects. I feel the benefits of the medication outweigh the risks.    Is not getting adequate diet and exercise  Goals developed at last visit were not met   Follow up in 4 months  Care management needs are self-addressed. Self-management abilities addressed and patient is capable of managing his own disease.           Relevant Medications    atorvastatin (LIPITOR) 10 MG tablet    Other Relevant Orders    Lipid Panel With / Chol / HDL Ratio    Comprehensive Metabolic Panel    Hypertension    Overview     No medication other than potassium         Current Assessment & Plan     Doing well.  Encouraged to watch salt, exercise more and lose weight.           Hypothyroidism    Current Assessment & Plan     Doing  well.  TSH  done 12-, read by me, reviewed with pt.  TSH was 2.050 up from 1.230.  Patient tolerated Synthroid 88 MCG well without side effects. I feel the benefits of the medication outweigh the risks.          Relevant Medications    levothyroxine (SYNTHROID, LEVOTHROID) 88 MCG tablet    Other Relevant Orders    TSH    Malignant neoplasm of colon    Overview     Last colonoscopy was in March 22, 2017 by Dr. Calabrese this was done with a gastroscope through his stoma         Current Assessment & Plan     Doing well.  Pt. Declines repeat colonoscopy and Cologuard            Low    RESOLVED: BPH (benign prostatic hyperplasia)    Current Assessment & Plan     Resolved thus delete         Elevated alkaline phosphatase level    Overview     Asymptomatic         Current Assessment & Plan     Worse.  CMP  done 12-, read by me, reviewed with pt.  Alk.Phos was 141 up from 127.  Pt. Declines work up, will repeat with next labs         Gastroesophageal reflux disease    Current Assessment & Plan     Doing well.  Patient tolerated Omeprazole well without side effects. I feel the benefits of the medication outweigh the risks.          Relevant Medications    omeprazole (priLOSEC) 20 MG capsule    Hypokalemia    Current Assessment & Plan     Resolved x 3.  CMP  done 12-, read by me, reviewed with pt.  Potassium was normal x 3.  Patient tolerated Potassium well without side effects. I feel the benefits of the medication outweigh the risks.          Relevant Medications    potassium chloride (KLOR-CON M20) 20 MEQ CR tablet    Hypomagnesemia    Current Assessment & Plan     Doing well x 1.  Magnesium done 12-, read by me, reviewed with pt.  Magnesium was 1.7 up from 1.5.  Patient tolerated Magnesium well without side effects. I feel the benefits of the medication outweigh the risks.          Relevant Medications    magnesium chloride ER (MagDelay) 64 MG DR tablet    Right bundle branch block    Overview      Last EKG was done December 19, 2024 shows moderate intraventricular conduction delay.         Current Assessment & Plan     Doing well.  EKG done 12-19-24 and read by myself shows normal sinus rhythm with ventricular rate of 67 with borderline left axis deviation - Stable from 11-25-22            Unprioritized    Encounter for general adult medical examination with abnormal findings    Overview                Current Assessment & Plan     Encouraged to do self-breast exam, self-testicle exams, and self derm exams. Congratulated on using seat belts.  Encouraged to do annual physical exams.  Immunization status reviewed.           Family history of diabetes mellitus    Current Assessment & Plan     Advised yrly glucose and lower risk factors         Family history of stroke    Current Assessment & Plan     Advised to lower risk factors.              Hearing loss    Current Assessment & Plan     Stable.  Advised to wear hearing protection and avoid noise exposure         History of cancer tonsil    Overview     Released by Dr. Faria         Current Assessment & Plan     Doing well.         Hypercalcemia    Current Assessment & Plan     New dx.  CMP done 12-, read by me, reviewed with pt. Calcium was 10.3 up from 9.9.  Will repeat with next labs in 4 month.  Avoid foods high in calcium         Immunization counseling    Overview     UTD as of 12-6-23  Covid  3-25-21 and 2-25-21  Pneu 20 12-6-23,   Flu 12-6-23  Pneu 13  11-13-17  Shingrix 2-9-21 and 10-21-20  T-dap 10-           Other proteinuria    Current Assessment & Plan     Will repeat with next labs         Relevant Orders    Urinalysis without microscopic (no culture) - Urine, Clean Catch    Screening for cardiovascular condition    Relevant Orders    CT Cardiac Calcium Score Without Dye    Vascular Screening (Bundle) CAR    Screening PSA (prostate specific antigen)    Current Assessment & Plan     Doing well.  PSA done 12-, read by me,  reviewed with pt.  PSA was 0.3 same as last         RESOLVED: Unilateral inguinal hernia without obstruction or gangrene    Current Assessment & Plan     Resolved thus delete                      Encouraged to check his skin, testicles and breasts monthly. Reviewed exercising regularly, eating a balanced diet, immunizations and if due, patient counselled to check with insurance company for coverage;, and regularly checking skin and breasts.

## 2024-12-30 NOTE — ASSESSMENT & PLAN NOTE
New dx.  CMP done 12-, read by me, reviewed with pt. Calcium was 10.3 up from 9.9.  Will repeat with next labs in 4 month.  Avoid foods high in calcium

## 2024-12-30 NOTE — ASSESSMENT & PLAN NOTE
Doing well.  EKG done 12-19-24 and read by myself shows normal sinus rhythm with ventricular rate of 67 with borderline left axis deviation - Stable from 11-25-22

## 2025-01-14 ENCOUNTER — HOSPITAL ENCOUNTER (OUTPATIENT)
Dept: ULTRASOUND IMAGING | Facility: HOSPITAL | Age: 70
Discharge: HOME | End: 2025-01-14

## 2025-01-14 ENCOUNTER — HOSPITAL ENCOUNTER (OUTPATIENT)
Dept: CT IMAGING | Facility: HOSPITAL | Age: 70
Discharge: HOME | End: 2025-01-14

## 2025-01-14 PROCEDURE — 93799 UNLISTED CV SVC/PROCEDURE: CPT

## 2025-01-14 PROCEDURE — 75571 CT HRT W/O DYE W/CA TEST: CPT

## 2025-01-16 LAB
BH CV LEA LEFT PTA DISTAL PSV: 57.1 CM/S
BH CV LEA RIGHT PTA DISTAL PSV: 59.1 CM/S
BH CV VAS SCREENING CAROTID CCA LEFT: 61.8 CM/SEC
BH CV VAS SCREENING CAROTID CCA RIGHT: 28.2 CM/SEC
BH CV VAS SCREENING CAROTID ICA LEFT: 47.6 CM/SEC
BH CV VAS SCREENING CAROTID ICA RIGHT: 32.1 CM/SEC
BH CV XLRA MEAS - MID AO DIAM: 2 CM
BH CV XLRA MEAS - PAD LEFT ABI PT: 1.1
BH CV XLRA MEAS - PAD LEFT ARM: 132 MMHG
BH CV XLRA MEAS - PAD LEFT LEG PT: 143 MMHG
BH CV XLRA MEAS - PAD RIGHT ABI PT: 1
BH CV XLRA MEAS - PAD RIGHT ARM: 130 MMHG
BH CV XLRA MEAS - PAD RIGHT LEG PT: 133 MMHG
BH CV XLRA MEAS LEFT DIST CCA EDV: 22.4 CM/SEC
BH CV XLRA MEAS LEFT DIST CCA PSV: 61.8 CM/SEC
BH CV XLRA MEAS LEFT ICA/CCA RATIO: 0.8
BH CV XLRA MEAS LEFT PROX ICA EDV: 16 CM/SEC
BH CV XLRA MEAS LEFT PROX ICA PSV: 47.6 CM/SEC
BH CV XLRA MEAS RIGHT DIST CCA EDV: 11.1 CM/SEC
BH CV XLRA MEAS RIGHT DIST CCA PSV: 28.2 CM/SEC
BH CV XLRA MEAS RIGHT ICA/CCA RATIO: 1.1
BH CV XLRA MEAS RIGHT PROX ICA EDV: 14.1 CM/SEC
BH CV XLRA MEAS RIGHT PROX ICA PSV: 32.1 CM/SEC

## 2025-04-24 LAB
ALBUMIN SERPL-MCNC: 4.7 G/DL (ref 3.9–4.9)
ALP SERPL-CCNC: 144 IU/L (ref 44–121)
ALT SERPL-CCNC: 23 IU/L (ref 0–44)
APPEARANCE UR: CLEAR
AST SERPL-CCNC: 17 IU/L (ref 0–40)
BILIRUB SERPL-MCNC: 0.8 MG/DL (ref 0–1.2)
BILIRUB UR QL STRIP: NEGATIVE
BUN SERPL-MCNC: 14 MG/DL (ref 8–27)
BUN/CREAT SERPL: 11 (ref 10–24)
CALCIUM SERPL-MCNC: 9.7 MG/DL (ref 8.6–10.2)
CHLORIDE SERPL-SCNC: 104 MMOL/L (ref 96–106)
CHOLEST SERPL-MCNC: 142 MG/DL (ref 100–199)
CHOLEST/HDLC SERPL: 3.4 RATIO (ref 0–5)
CO2 SERPL-SCNC: 18 MMOL/L (ref 20–29)
COLOR UR: YELLOW
CREAT SERPL-MCNC: 1.24 MG/DL (ref 0.76–1.27)
EGFRCR SERPLBLD CKD-EPI 2021: 64 ML/MIN/1.73
GLOBULIN SER CALC-MCNC: 2.6 G/DL (ref 1.5–4.5)
GLUCOSE SERPL-MCNC: 90 MG/DL (ref 70–99)
GLUCOSE UR QL STRIP: NEGATIVE
HDLC SERPL-MCNC: 42 MG/DL
HGB UR QL STRIP: NEGATIVE
KETONES UR QL STRIP: NEGATIVE
LDLC SERPL CALC-MCNC: 39 MG/DL (ref 0–99)
LEUKOCYTE ESTERASE UR QL STRIP: NEGATIVE
NITRITE UR QL STRIP: NEGATIVE
PH UR STRIP: 6 [PH] (ref 5–7.5)
POTASSIUM SERPL-SCNC: 4.1 MMOL/L (ref 3.5–5.2)
PROT SERPL-MCNC: 7.3 G/DL (ref 6–8.5)
PROT UR QL STRIP: ABNORMAL
SODIUM SERPL-SCNC: 140 MMOL/L (ref 134–144)
SP GR UR STRIP: 1.03 (ref 1–1.03)
TRIGL SERPL-MCNC: 427 MG/DL (ref 0–149)
TSH SERPL DL<=0.005 MIU/L-ACNC: 1.55 UIU/ML (ref 0.45–4.5)
UROBILINOGEN UR STRIP-MCNC: 0.2 MG/DL (ref 0.2–1)
VLDLC SERPL CALC-MCNC: 61 MG/DL (ref 5–40)

## 2025-04-30 ENCOUNTER — OFFICE VISIT (OUTPATIENT)
Dept: FAMILY MEDICINE CLINIC | Facility: CLINIC | Age: 70
End: 2025-04-30
Payer: MEDICARE

## 2025-04-30 VITALS
OXYGEN SATURATION: 98 % | BODY MASS INDEX: 24.25 KG/M2 | HEART RATE: 100 BPM | SYSTOLIC BLOOD PRESSURE: 128 MMHG | TEMPERATURE: 97.1 F | RESPIRATION RATE: 14 BRPM | HEIGHT: 68 IN | WEIGHT: 160 LBS | DIASTOLIC BLOOD PRESSURE: 82 MMHG

## 2025-04-30 DIAGNOSIS — E03.8 OTHER SPECIFIED HYPOTHYROIDISM: ICD-10-CM

## 2025-04-30 DIAGNOSIS — R74.8 ELEVATED ALKALINE PHOSPHATASE LEVEL: ICD-10-CM

## 2025-04-30 DIAGNOSIS — R80.8 OTHER PROTEINURIA: ICD-10-CM

## 2025-04-30 DIAGNOSIS — E83.52 HYPERCALCEMIA: ICD-10-CM

## 2025-04-30 DIAGNOSIS — I10 PRIMARY HYPERTENSION: ICD-10-CM

## 2025-04-30 DIAGNOSIS — E78.2 MIXED HYPERLIPIDEMIA: Primary | ICD-10-CM

## 2025-04-30 NOTE — PROGRESS NOTES
Subjective   Lavelle Mitchell is a 70 y.o. male.   Chief Complaint   Patient presents with    Hyperlipidemia     Hyperlipidemia  This is a chronic problem. The current episode started more than 1 year ago. The problem is controlled. Exacerbating diseases include hypothyroidism. Pertinent negatives include no chest pain, myalgias or shortness of breath. Current antihyperlipidemic treatment includes statins.   Hypertension  Chronicity:  Chronic  Onset:  More than 1 year ago  Progression since onset:  Improved  Condition status:  Controlled  Associated symptoms: no chest pain, no palpitations and no shortness of breath    Hypothyroidism  Presents for follow-up visit. Patient reports no cold intolerance, dry skin, fatigue, heat intolerance, palpitations, weight gain or weight loss. The symptoms have been stable. His past medical history is significant for hyperlipidemia.        The following portions of the patient's history were reviewed and updated as appropriate: allergies, current medications, past family history, past medical history, past social history, past surgical history, and problem list.    Past Medical History:   Diagnosis Date    Hyperlipidemia     Hypertension     Hypothyroidism        Past Surgical History:   Procedure Laterality Date    CHOLECYSTECTOMY      COLON SURGERY      COLONOSCOPY      LYMPH NODE BIOPSY          Family History   Problem Relation Age of Onset    Heart disease Mother     Hyperlipidemia Mother     Diabetes Mother     Cancer Mother         stomach    Vision loss Mother         mother hole in her eye    Heart disease Father     Stroke Father     Hyperlipidemia Father     Hypertension Sister     Diabetes Sister     Birth defects Maternal Grandmother     Birth defects Paternal Grandmother         Social History     Socioeconomic History    Marital status:      Spouse name: Juan M    Number of children: 2   Tobacco Use    Smoking status: Former     Types: Pipe     Quit date:  "2008     Years since quittin.8    Smokeless tobacco: Never   Substance and Sexual Activity    Alcohol use: Yes     Comment: 1 drink monthly    Drug use: No    Sexual activity: Defer       Outpatient Medications Prior to Visit   Medication Sig Dispense Refill    atorvastatin (LIPITOR) 10 MG tablet Take 1 tablet by mouth Daily. 90 tablet 3    levothyroxine (SYNTHROID, LEVOTHROID) 88 MCG tablet Take 1 tablet by mouth Daily. 90 tablet 3    magnesium chloride ER (MagDelay) 64 MG DR tablet Take 4 pills daily      omeprazole (priLOSEC) 20 MG capsule Take 1 capsule by mouth Daily. 90 capsule 3    potassium chloride (KLOR-CON M20) 20 MEQ CR tablet Take 1 tablet by mouth 4 (Four) Times a Day.       No facility-administered medications prior to visit.        Review of Systems   Constitutional:  Negative for appetite change, fatigue, unexpected weight gain and unexpected weight loss.   Respiratory:  Negative for shortness of breath.    Cardiovascular:  Negative for chest pain, palpitations and leg swelling.   Gastrointestinal:  Negative for nausea.   Endocrine: Negative for cold intolerance and heat intolerance.   Musculoskeletal:  Negative for myalgias.   Skin:  Negative for dry skin.   Neurological:  Negative for headache.       Objective   Visit Vitals  /82 (BP Location: Right arm, Patient Position: Sitting, Cuff Size: Adult)   Pulse 100   Temp 97.1 °F (36.2 °C)   Resp 14   Ht 172.7 cm (68\")   Wt 72.6 kg (160 lb)   SpO2 98%   BMI 24.33 kg/m²     Physical Exam  Vitals and nursing note reviewed.   Constitutional:       Appearance: He is well-developed.   HENT:      Head: Normocephalic.   Neck:      Thyroid: No thyromegaly.      Vascular: No carotid bruit.      Trachea: Trachea normal.   Cardiovascular:      Rate and Rhythm: Normal rate and regular rhythm.      Heart sounds: No murmur heard.     No friction rub. No gallop.   Pulmonary:      Effort: Pulmonary effort is normal. No respiratory distress.      Breath " sounds: Normal breath sounds. No wheezing.   Chest:      Chest wall: No tenderness.   Musculoskeletal:      Cervical back: Neck supple.   Skin:     General: Skin is dry.      Findings: No rash.      Nails: There is no clubbing.   Neurological:      Mental Status: He is alert and oriented to person, place, and time.   Psychiatric:         Behavior: Behavior is cooperative.         Assessment & Plan   Problem List Items Addressed This Visit          Medium    Hyperlipidemia - Primary    Overview   Calcium score done January 5, 2025 was 0         Current Assessment & Plan   -Slightly worse but close to goal.   Encouraged to watch fatty intake, exercise more, and lose weight.   compliant with medication Patient tolerated lipitor well without side effects. I feel the benefits of the medication outweigh the risks.    Is not getting adequate diet and exercise  Goals developed at last visit were not met   Follow up in  4 months  Care management needs are self-addressed.  Self-management abilities addressed and patient is capable of managing his own disease.           Relevant Orders    Comprehensive Metabolic Panel    Lipid Panel    Hypertension    Overview   No medication other than potassium         Current Assessment & Plan   Doing well; Encouraged to watch salt, exercise more and lose weight.  No medication         Hypothyroidism    Current Assessment & Plan   Doing well; TSH 1.550  Patient tolerated synthroid well without side effects. I feel the benefits of the medication outweigh the risks.           Relevant Orders    TSH       Low    Elevated alkaline phosphatase level    Overview   Asymptomatic         Current Assessment & Plan   Patient is asymptomatic and will follow conservatively.             Unprioritized    Hypercalcemia    Current Assessment & Plan   Resolved x 1  Calcium 9.7         Other proteinuria    Current Assessment & Plan   Improved but not at goal- 1+ in urine- Offered to see a nephrologist, 24 hr  urine or repeat in 4 months.  Patient prefers to repeat in 4 months.          Relevant Orders    Microalbumin / Creatinine Urine Ratio - Urine, Clean Catch    Urinalysis With Culture If Indicated -

## 2025-04-30 NOTE — ASSESSMENT & PLAN NOTE
Doing well; TSH 1.550  Patient tolerated synthroid well without side effects. I feel the benefits of the medication outweigh the risks.

## 2025-04-30 NOTE — ASSESSMENT & PLAN NOTE
-Slightly worse but close to goal.   Encouraged to watch fatty intake, exercise more, and lose weight.   compliant with medication Patient tolerated lipitor well without side effects. I feel the benefits of the medication outweigh the risks.    Is not getting adequate diet and exercise  Goals developed at last visit were not met   Follow up in  4 months  Care management needs are self-addressed.  Self-management abilities addressed and patient is capable of managing his own disease.

## 2025-04-30 NOTE — ASSESSMENT & PLAN NOTE
Improved but not at goal- 1+ in urine- Offered to see a nephrologist, 24 hr urine or repeat in 4 months.  Patient prefers to repeat in 4 months.

## 2025-08-22 LAB
ALBUMIN SERPL-MCNC: 4.5 G/DL (ref 3.9–4.9)
ALBUMIN/CREAT UR: 2 MG/G CREAT (ref 0–29)
ALP SERPL-CCNC: 123 IU/L (ref 44–121)
ALT SERPL-CCNC: 34 IU/L (ref 0–44)
APPEARANCE UR: CLEAR
AST SERPL-CCNC: 23 IU/L (ref 0–40)
BACTERIA #/AREA URNS HPF: NORMAL /[HPF]
BILIRUB SERPL-MCNC: 0.8 MG/DL (ref 0–1.2)
BILIRUB UR QL STRIP: NEGATIVE
BUN SERPL-MCNC: 16 MG/DL (ref 8–27)
BUN/CREAT SERPL: 11 (ref 10–24)
CALCIUM SERPL-MCNC: 9.6 MG/DL (ref 8.6–10.2)
CASTS URNS QL MICRO: NORMAL /LPF
CHLORIDE SERPL-SCNC: 104 MMOL/L (ref 96–106)
CHOLEST SERPL-MCNC: 169 MG/DL (ref 100–199)
CO2 SERPL-SCNC: 19 MMOL/L (ref 20–29)
COLOR UR: YELLOW
CREAT SERPL-MCNC: 1.4 MG/DL (ref 0.76–1.27)
CREAT UR-MCNC: 282.4 MG/DL
EGFRCR SERPLBLD CKD-EPI 2021: 55 ML/MIN/1.73
EPI CELLS #/AREA URNS HPF: NORMAL /HPF (ref 0–10)
GLOBULIN SER CALC-MCNC: 2.5 G/DL (ref 1.5–4.5)
GLUCOSE SERPL-MCNC: 102 MG/DL (ref 70–99)
GLUCOSE UR QL STRIP: NEGATIVE
HDLC SERPL-MCNC: 44 MG/DL
HGB UR QL STRIP: NEGATIVE
KETONES UR QL STRIP: NEGATIVE
LDLC SERPL CALC-MCNC: 58 MG/DL (ref 0–99)
LEUKOCYTE ESTERASE UR QL STRIP: NEGATIVE
MICRO URNS: ABNORMAL
MICROALBUMIN UR-MCNC: 6.9 UG/ML
NITRITE UR QL STRIP: NEGATIVE
PH UR STRIP: 6 [PH] (ref 5–7.5)
POTASSIUM SERPL-SCNC: 3.6 MMOL/L (ref 3.5–5.2)
PROT SERPL-MCNC: 7 G/DL (ref 6–8.5)
PROT UR QL STRIP: ABNORMAL
RBC #/AREA URNS HPF: NORMAL /HPF (ref 0–2)
SODIUM SERPL-SCNC: 140 MMOL/L (ref 134–144)
SP GR UR STRIP: 1.02 (ref 1–1.03)
TRIGL SERPL-MCNC: 443 MG/DL (ref 0–149)
TSH SERPL DL<=0.005 MIU/L-ACNC: 3.69 UIU/ML (ref 0.45–4.5)
URINALYSIS REFLEX: ABNORMAL
UROBILINOGEN UR STRIP-MCNC: 0.2 MG/DL (ref 0.2–1)
VLDLC SERPL CALC-MCNC: 67 MG/DL (ref 5–40)
WBC #/AREA URNS HPF: NORMAL /HPF (ref 0–5)

## 2025-08-28 ENCOUNTER — OFFICE VISIT (OUTPATIENT)
Dept: FAMILY MEDICINE CLINIC | Facility: CLINIC | Age: 70
End: 2025-08-28
Payer: MEDICARE

## 2025-08-28 VITALS
HEIGHT: 68 IN | BODY MASS INDEX: 24.74 KG/M2 | HEART RATE: 94 BPM | DIASTOLIC BLOOD PRESSURE: 82 MMHG | OXYGEN SATURATION: 98 % | SYSTOLIC BLOOD PRESSURE: 136 MMHG | TEMPERATURE: 96.9 F | RESPIRATION RATE: 18 BRPM | WEIGHT: 163.2 LBS

## 2025-08-28 DIAGNOSIS — E03.8 OTHER SPECIFIED HYPOTHYROIDISM: Primary | ICD-10-CM

## 2025-08-28 DIAGNOSIS — E83.42 HYPOMAGNESEMIA: ICD-10-CM

## 2025-08-28 DIAGNOSIS — E87.6 HYPOKALEMIA: ICD-10-CM

## 2025-08-28 DIAGNOSIS — E83.52 HYPERCALCEMIA: ICD-10-CM

## 2025-08-28 DIAGNOSIS — R74.8 ELEVATED ALKALINE PHOSPHATASE LEVEL: ICD-10-CM

## 2025-08-28 DIAGNOSIS — R80.8 OTHER PROTEINURIA: ICD-10-CM

## 2025-08-28 DIAGNOSIS — I10 PRIMARY HYPERTENSION: ICD-10-CM

## 2025-08-28 DIAGNOSIS — N18.31 STAGE 3A CHRONIC KIDNEY DISEASE: ICD-10-CM

## 2025-08-28 DIAGNOSIS — E78.2 MIXED HYPERLIPIDEMIA: ICD-10-CM

## 2025-08-28 RX ORDER — POTASSIUM CHLORIDE 1500 MG/1
20 TABLET, EXTENDED RELEASE ORAL 4 TIMES DAILY
Start: 2025-08-28

## 2025-08-28 RX ORDER — ATORVASTATIN CALCIUM 10 MG/1
10 TABLET, FILM COATED ORAL DAILY
Start: 2025-08-28

## 2025-08-28 RX ORDER — LEVOTHYROXINE SODIUM 88 UG/1
88 TABLET ORAL DAILY
Start: 2025-08-28